# Patient Record
Sex: MALE | Race: WHITE | NOT HISPANIC OR LATINO | Employment: FULL TIME | URBAN - METROPOLITAN AREA
[De-identification: names, ages, dates, MRNs, and addresses within clinical notes are randomized per-mention and may not be internally consistent; named-entity substitution may affect disease eponyms.]

---

## 2017-10-04 ENCOUNTER — HOSPITAL ENCOUNTER (OUTPATIENT)
Dept: LAB | Facility: MEDICAL CENTER | Age: 67
End: 2017-10-04
Attending: PODIATRIST
Payer: MEDICARE

## 2017-10-04 PROCEDURE — 87205 SMEAR GRAM STAIN: CPT

## 2017-10-04 PROCEDURE — 87070 CULTURE OTHR SPECIMN AEROBIC: CPT

## 2017-10-05 LAB
GRAM STN SPEC: NORMAL
SIGNIFICANT IND 70042: NORMAL
SITE SITE: NORMAL
SOURCE SOURCE: NORMAL

## 2017-10-07 LAB
BACTERIA WND AEROBE CULT: ABNORMAL
BACTERIA WND AEROBE CULT: ABNORMAL
GRAM STN SPEC: ABNORMAL
SIGNIFICANT IND 70042: ABNORMAL
SITE SITE: ABNORMAL
SOURCE SOURCE: ABNORMAL

## 2017-10-17 ENCOUNTER — HOSPITAL ENCOUNTER (OUTPATIENT)
Facility: MEDICAL CENTER | Age: 67
End: 2017-10-17
Attending: PODIATRIST
Payer: MEDICARE

## 2017-10-17 LAB
GRAM STN SPEC: NORMAL
SIGNIFICANT IND 70042: NORMAL
SITE SITE: NORMAL
SOURCE SOURCE: NORMAL

## 2017-10-17 PROCEDURE — 87070 CULTURE OTHR SPECIMN AEROBIC: CPT

## 2017-10-17 PROCEDURE — 87205 SMEAR GRAM STAIN: CPT

## 2017-10-19 LAB
BACTERIA WND AEROBE CULT: NORMAL
GRAM STN SPEC: NORMAL
SIGNIFICANT IND 70042: NORMAL
SITE SITE: NORMAL
SOURCE SOURCE: NORMAL

## 2017-12-14 ENCOUNTER — HOSPITAL ENCOUNTER (OUTPATIENT)
Dept: LAB | Facility: MEDICAL CENTER | Age: 67
End: 2017-12-14
Attending: INTERNAL MEDICINE
Payer: MEDICARE

## 2017-12-14 LAB
CREAT UR-MCNC: 118.2 MG/DL
MICROALBUMIN UR-MCNC: 66.6 MG/DL
MICROALBUMIN/CREAT UR: 563 MG/G (ref 0–30)

## 2017-12-14 PROCEDURE — 82570 ASSAY OF URINE CREATININE: CPT

## 2017-12-14 PROCEDURE — 82043 UR ALBUMIN QUANTITATIVE: CPT

## 2019-04-29 ENCOUNTER — APPOINTMENT (OUTPATIENT)
Dept: RADIOLOGY | Facility: MEDICAL CENTER | Age: 69
End: 2019-04-29
Attending: EMERGENCY MEDICINE
Payer: MEDICARE

## 2019-04-29 ENCOUNTER — HOSPITAL ENCOUNTER (EMERGENCY)
Facility: MEDICAL CENTER | Age: 69
End: 2019-04-29
Attending: EMERGENCY MEDICINE
Payer: MEDICARE

## 2019-04-29 VITALS
BODY MASS INDEX: 31.44 KG/M2 | DIASTOLIC BLOOD PRESSURE: 81 MMHG | HEIGHT: 74 IN | HEART RATE: 58 BPM | SYSTOLIC BLOOD PRESSURE: 183 MMHG | TEMPERATURE: 97.8 F | WEIGHT: 245 LBS | RESPIRATION RATE: 18 BRPM | OXYGEN SATURATION: 95 %

## 2019-04-29 DIAGNOSIS — R42 VERTIGO: ICD-10-CM

## 2019-04-29 DIAGNOSIS — R42 LIGHTHEADEDNESS: ICD-10-CM

## 2019-04-29 LAB
ALBUMIN SERPL BCP-MCNC: 4.2 G/DL (ref 3.2–4.9)
ALBUMIN/GLOB SERPL: 1.5 G/DL
ALP SERPL-CCNC: 89 U/L (ref 30–99)
ALT SERPL-CCNC: 23 U/L (ref 2–50)
ANION GAP SERPL CALC-SCNC: 9 MMOL/L (ref 0–11.9)
AST SERPL-CCNC: 19 U/L (ref 12–45)
BASOPHILS # BLD AUTO: 0.8 % (ref 0–1.8)
BASOPHILS # BLD: 0.04 K/UL (ref 0–0.12)
BILIRUB SERPL-MCNC: 1.1 MG/DL (ref 0.1–1.5)
BUN SERPL-MCNC: 17 MG/DL (ref 8–22)
CALCIUM SERPL-MCNC: 8.8 MG/DL (ref 8.4–10.2)
CHLORIDE SERPL-SCNC: 100 MMOL/L (ref 96–112)
CO2 SERPL-SCNC: 27 MMOL/L (ref 20–33)
CREAT SERPL-MCNC: 0.75 MG/DL (ref 0.5–1.4)
EKG IMPRESSION: NORMAL
EOSINOPHIL # BLD AUTO: 0.21 K/UL (ref 0–0.51)
EOSINOPHIL NFR BLD: 4.2 % (ref 0–6.9)
ERYTHROCYTE [DISTWIDTH] IN BLOOD BY AUTOMATED COUNT: 40.5 FL (ref 35.9–50)
GLOBULIN SER CALC-MCNC: 2.8 G/DL (ref 1.9–3.5)
GLUCOSE SERPL-MCNC: 173 MG/DL (ref 65–99)
HCT VFR BLD AUTO: 42.3 % (ref 42–52)
HGB BLD-MCNC: 14.6 G/DL (ref 14–18)
IMM GRANULOCYTES # BLD AUTO: 0.03 K/UL (ref 0–0.11)
IMM GRANULOCYTES NFR BLD AUTO: 0.6 % (ref 0–0.9)
LYMPHOCYTES # BLD AUTO: 1.51 K/UL (ref 1–4.8)
LYMPHOCYTES NFR BLD: 30.3 % (ref 22–41)
MAGNESIUM SERPL-MCNC: 1.6 MG/DL (ref 1.5–2.5)
MCH RBC QN AUTO: 30.4 PG (ref 27–33)
MCHC RBC AUTO-ENTMCNC: 34.5 G/DL (ref 33.7–35.3)
MCV RBC AUTO: 87.9 FL (ref 81.4–97.8)
MONOCYTES # BLD AUTO: 0.52 K/UL (ref 0–0.85)
MONOCYTES NFR BLD AUTO: 10.4 % (ref 0–13.4)
NEUTROPHILS # BLD AUTO: 2.68 K/UL (ref 1.82–7.42)
NEUTROPHILS NFR BLD: 53.7 % (ref 44–72)
NRBC # BLD AUTO: 0 K/UL
NRBC BLD-RTO: 0 /100 WBC
PLATELET # BLD AUTO: 171 K/UL (ref 164–446)
PMV BLD AUTO: 8.9 FL (ref 9–12.9)
POTASSIUM SERPL-SCNC: 3.7 MMOL/L (ref 3.6–5.5)
PROT SERPL-MCNC: 7 G/DL (ref 6–8.2)
RBC # BLD AUTO: 4.81 M/UL (ref 4.7–6.1)
SODIUM SERPL-SCNC: 136 MMOL/L (ref 135–145)
TROPONIN I SERPL-MCNC: <0.02 NG/ML (ref 0–0.04)
WBC # BLD AUTO: 5 K/UL (ref 4.8–10.8)

## 2019-04-29 PROCEDURE — 700101 HCHG RX REV CODE 250: Performed by: EMERGENCY MEDICINE

## 2019-04-29 PROCEDURE — 93005 ELECTROCARDIOGRAM TRACING: CPT

## 2019-04-29 PROCEDURE — 70551 MRI BRAIN STEM W/O DYE: CPT

## 2019-04-29 PROCEDURE — 700102 HCHG RX REV CODE 250 W/ 637 OVERRIDE(OP): Performed by: EMERGENCY MEDICINE

## 2019-04-29 PROCEDURE — 36415 COLL VENOUS BLD VENIPUNCTURE: CPT

## 2019-04-29 PROCEDURE — 85025 COMPLETE CBC W/AUTO DIFF WBC: CPT

## 2019-04-29 PROCEDURE — 99285 EMERGENCY DEPT VISIT HI MDM: CPT

## 2019-04-29 PROCEDURE — 84484 ASSAY OF TROPONIN QUANT: CPT

## 2019-04-29 PROCEDURE — 93005 ELECTROCARDIOGRAM TRACING: CPT | Performed by: EMERGENCY MEDICINE

## 2019-04-29 PROCEDURE — 96374 THER/PROPH/DIAG INJ IV PUSH: CPT

## 2019-04-29 PROCEDURE — 71045 X-RAY EXAM CHEST 1 VIEW: CPT

## 2019-04-29 PROCEDURE — 80053 COMPREHEN METABOLIC PANEL: CPT

## 2019-04-29 PROCEDURE — A9270 NON-COVERED ITEM OR SERVICE: HCPCS | Performed by: EMERGENCY MEDICINE

## 2019-04-29 PROCEDURE — 83735 ASSAY OF MAGNESIUM: CPT

## 2019-04-29 PROCEDURE — 700105 HCHG RX REV CODE 258: Performed by: EMERGENCY MEDICINE

## 2019-04-29 RX ORDER — SODIUM CHLORIDE 9 MG/ML
INJECTION, SOLUTION INTRAVENOUS CONTINUOUS
Status: DISCONTINUED | OUTPATIENT
Start: 2019-04-29 | End: 2019-04-29 | Stop reason: HOSPADM

## 2019-04-29 RX ORDER — MECLIZINE HYDROCHLORIDE 25 MG/1
25 TABLET ORAL 3 TIMES DAILY PRN
Qty: 30 TAB | Refills: 0 | Status: SHIPPED | OUTPATIENT
Start: 2019-04-29 | End: 2022-02-07

## 2019-04-29 RX ORDER — PROMETHAZINE HYDROCHLORIDE 25 MG/1
25 TABLET ORAL EVERY 6 HOURS PRN
Qty: 30 TAB | Refills: 0 | Status: SHIPPED | OUTPATIENT
Start: 2019-04-29 | End: 2023-07-06

## 2019-04-29 RX ORDER — METOPROLOL TARTRATE 1 MG/ML
5 INJECTION, SOLUTION INTRAVENOUS ONCE
Status: COMPLETED | OUTPATIENT
Start: 2019-04-29 | End: 2019-04-29

## 2019-04-29 RX ORDER — PROMETHAZINE HYDROCHLORIDE 25 MG/1
25 TABLET ORAL ONCE
Status: COMPLETED | OUTPATIENT
Start: 2019-04-29 | End: 2019-04-29

## 2019-04-29 RX ADMIN — METOPROLOL TARTRATE 5 MG: 1 INJECTION, SOLUTION INTRAVENOUS at 14:40

## 2019-04-29 RX ADMIN — SODIUM CHLORIDE 1000 ML: 9 INJECTION, SOLUTION INTRAVENOUS at 14:39

## 2019-04-29 RX ADMIN — PROMETHAZINE HYDROCHLORIDE 25 MG: 25 TABLET ORAL at 14:39

## 2019-04-29 ASSESSMENT — LIFESTYLE VARIABLES: DO YOU DRINK ALCOHOL: NO

## 2019-04-29 NOTE — ED TRIAGE NOTES
Chief Complaint   Patient presents with   • Dizziness     Pt states that he has had dizziness x couple of days, worse x 12 hours

## 2019-04-29 NOTE — ED PROVIDER NOTES
ED Provider Note    CHIEF COMPLAINT  Chief Complaint   Patient presents with   • Dizziness     Pt states that he has had dizziness x couple of days, worse x 12 hours       HPI  Dejan Mooney is a 68 y.o. diabetic male who presents with 12 hours of increasing dizziness that he describes as lightheadedness and feeling off balance.  He has a history of intermittent vertigo, and he does not feel like this is the same.  He did see the audiologist at his ENT office who did some maneuvers and stated they felt like this was classic right-sided labyrinthitis, but the patient feels like this is very different.  His symptoms are exacerbated with his head being elevated but not with turning.  He has had a little bit more shortness of breath than usual.  He denies chest pain.  He has had intermittent headaches over the last month, worsening over the last 3 days.  He has been nauseated without vomiting.  No diarrhea or dysuria.  He has not taken his antihypertensives for the last 3 days and no gabapentin today for diabetic neuropathy.    REVIEW OF SYSTEMS  See HPI for further details. All other systems are negative.    PAST MEDICAL HISTORY  Past Medical History:   Diagnosis Date   • Renal calculus 7/2013    kidney stones   • Pneumonia 1974   • BBB (bundle branch block)    • Breath shortness    • Congestive heart failure (HCC)     mild, cardiologist, Dr. Monster Klein   • Diabetes     insulin and oral medication   • Heart burn    • Hypertension    • Indigestion    • Pain     knee, shoulder   • Psychiatric problem     depression   • Snoring     sleep apnea questionairre completed       FAMILY HISTORY  History reviewed. No pertinent family history.    SOCIAL HISTORY  Social History     Social History   • Marital status:      Spouse name: N/A   • Number of children: N/A   • Years of education: N/A     Social History Main Topics   • Smoking status: Never Smoker   • Smokeless tobacco: Never Used   • Alcohol use No   •  "Drug use: No   • Sexual activity: Not on file     Other Topics Concern   • Not on file     Social History Narrative   • No narrative on file       SURGICAL HISTORY  Past Surgical History:   Procedure Laterality Date   • RECOVERY  5/13/2014    Performed by [facility]Rec[facility] Surgery at SURGERY SAME DAY [facility]   • OTHER  2/2014    right knee   • KNEE ARTHROSCOPY  8/22/2013    Performed by Maurisio Alfaro M.D. at SURGERY [facility]   • MEDIAL MENISCECTOMY  8/22/2013    Performed by Maurisio Alfaro M.D. at SURGERY [facility]   • SHOULDER ARTHROTOMY  2003    right   • KNEE ARTHROSCOPY  1990    left   • KNEE ARTHROSCOPY  1985    right   • ANKLE ORIF  1984    left   • KNEE ORIF  1970    left   • TONSILLECTOMY  as child       CURRENT MEDICATIONS  Home Medications    **Home medications have not yet been reviewed for this encounter**         ALLERGIES  Allergies   Allergen Reactions   • Pcn [Penicillins] Hives and Swelling       PHYSICAL EXAM  VITAL SIGNS: BP (!) 183/81   Pulse 65   Temp 36.5 °C (97.7 °F) (Temporal)   Resp 18   Ht 1.88 m (6' 2\")   Wt 111.1 kg (245 lb)   SpO2 96%   BMI 31.46 kg/m²  @JENNIFER[[id number]::@   Constitutional: Well developed, obese, No acute respiratory distress, Non-toxic appearance.   HENT: Normocephalic, Atraumatic, Bilateral external ears normal, Oropharynx clear, mucous membranes are dry.  Eyes: PERRLA at 4 mm, EOMI without nystagmus, Conjunctiva normal, No discharge. No icterus.  No significant visual field cuts  Neck: Normal range of motion. Supple, No stridor.   Lymphatic: No cervical lymphadenopathy noted.   Cardiovascular: Normal heart rate, Normal rhythm, No murmurs, No rubs, No gallops.   Thorax & Lungs: Clear to auscultation bilaterally, No respiratory distress, No wheezing.  Abdomen: Bowel sounds normal, Soft, No tenderness, No masses, no rebound or guarding   Skin: Warm, Dry, No erythema, No rash.   Extremities: Intact distal pulses, trace shin edema, No " tenderness.  5 out of 5 strength bilateral upper and lower extremities against my resistance.  Musculoskeletal: Good range of motion in all major joints. No tenderness to palpation or major deformities noted.   Neurologic: Alert & oriented x 3, cranial nerve and cerebellar exams normal.  Sensation intact to the hands, feet, face.  Finger-to-nose, hand roll, heel-to-shin all intact symmetrically  Psychiatric: Affect normal, Judgment normal, mildly anxious    EKG  Twelve-lead EKG by my interpretation is as below.  No ST or T wave changes to indicate ischemia or infarct    RADIOLOGY/PROCEDURES  MR-BRAIN-W/O   Final Result      1.  Mild cerebral atrophy.   2.  Mild chronic microvascular ischemic disease.   3.  No acute infarct.      DX-CHEST-PORTABLE (1 VIEW)   Final Result      No acute cardiopulmonary abnormality identified.      CT-HEAD W/O    (Results Pending)         COURSE & MEDICAL DECISION MAKING  Pertinent Labs & Imaging studies reviewed. (See chart for details)  Differential diagnosis may include labyrinthitis, neoplasm, tumor, stroke.  IV fluids given for poor p.o. intake, mucous membranes being dry.  Results for orders placed or performed during the hospital encounter of 04/29/19   CBC with Differential   Result Value Ref Range    WBC 5.0 4.8 - 10.8 K/uL    RBC 4.81 4.70 - 6.10 M/uL    Hemoglobin 14.6 14.0 - 18.0 g/dL    Hematocrit 42.3 42.0 - 52.0 %    MCV 87.9 81.4 - 97.8 fL    MCH 30.4 27.0 - 33.0 pg    MCHC 34.5 33.7 - 35.3 g/dL    RDW 40.5 35.9 - 50.0 fL    Platelet Count 171 164 - 446 K/uL    MPV 8.9 (L) 9.0 - 12.9 fL    Neutrophils-Polys 53.70 44.00 - 72.00 %    Lymphocytes 30.30 22.00 - 41.00 %    Monocytes 10.40 0.00 - 13.40 %    Eosinophils 4.20 0.00 - 6.90 %    Basophils 0.80 0.00 - 1.80 %    Immature Granulocytes 0.60 0.00 - 0.90 %    Nucleated RBC 0.00 /100 WBC    Neutrophils (Absolute) 2.68 1.82 - 7.42 K/uL    Lymphs (Absolute) 1.51 1.00 - 4.80 K/uL    Monos (Absolute) 0.52 0.00 - 0.85 K/uL     Eos (Absolute) 0.21 0.00 - 0.51 K/uL    Baso (Absolute) 0.04 0.00 - 0.12 K/uL    Immature Granulocytes (abs) 0.03 0.00 - 0.11 K/uL    NRBC (Absolute) 0.00 K/uL   Complete Metabolic Panel (CMP)   Result Value Ref Range    Sodium 136 135 - 145 mmol/L    Potassium 3.7 3.6 - 5.5 mmol/L    Chloride 100 96 - 112 mmol/L    Co2 27 20 - 33 mmol/L    Anion Gap 9.0 0.0 - 11.9    Glucose 173 (H) 65 - 99 mg/dL    Bun 17 8 - 22 mg/dL    Creatinine 0.75 0.50 - 1.40 mg/dL    Calcium 8.8 8.4 - 10.2 mg/dL    AST(SGOT) 19 12 - 45 U/L    ALT(SGPT) 23 2 - 50 U/L    Alkaline Phosphatase 89 30 - 99 U/L    Total Bilirubin 1.1 0.1 - 1.5 mg/dL    Albumin 4.2 3.2 - 4.9 g/dL    Total Protein 7.0 6.0 - 8.2 g/dL    Globulin 2.8 1.9 - 3.5 g/dL    A-G Ratio 1.5 g/dL   Troponin   Result Value Ref Range    Troponin I <0.02 0.00 - 0.04 ng/mL   MAGNESIUM   Result Value Ref Range    Magnesium 1.6 1.5 - 2.5 mg/dL   ESTIMATED GFR   Result Value Ref Range    GFR If African American >60 >60 mL/min/1.73 m 2    GFR If Non African American >60 >60 mL/min/1.73 m 2   EKG   Result Value Ref Range    Report       [facility] Emergency Dept.    Test Date:  2019  Pt Name:    TRAVON THURSTON         Department: [facility]  MRN:        [id number]                      Room:       -ROOM 10  Gender:     Male                         Technician: CLARENCE  :        1950                   Requested By:ER TRIAGE PROTOCOL  Order #:    [id number]                    Reading MD: ARLEEN KIMBALL MD    Measurements  Intervals                                Axis  Rate:       63                           P:          32  DE:         187                          QRS:        -45  QRSD:       133                          T:          3  QT:         429  QTc:        440    Interpretive Statements  Sinus rhythm  Right bundle branch block  Inferior infarct, old  Compared to ECG 2014 09:48:58  No significant changes    Electronically Signed On 2019  16:15:13 PDT by ARLEEN KIMBALL MD      4:16 PM reevaluation of the patient at bedside.  Patient is sitting up, is less nauseated and feels fairly well until he turns his head towards the right.  He feels safe going home and following up with his primary care provider and audiologist later this week.  He thinks that the IV fluids and Phenergan worked well to decrease his severity of symptoms.  Repeat systolic blood pressure is 161, improved with metoprolol.     The patient will return for new or worsening symptoms and is stable at the time of discharge.    The patient is referred to a primary physician for blood pressure management, diabetic screening, and for all other preventative health concerns.    DISPOSITION:  Patient will be discharged home in stable condition.    FOLLOW UP:  Dot Ray M.D.  [id number] [facility] [place] [address]  [phone]    Schedule an appointment as soon as possible for a visit       your audiologist            OUTPATIENT MEDICATIONS:  New Prescriptions    MECLIZINE (ANTIVERT) 25 MG TAB    Take 1 Tab by mouth 3 times a day as needed for Dizziness or Vertigo.    PROMETHAZINE (PHENERGAN) 25 MG TAB    Take 1 Tab by mouth every 6 hours as needed for Nausea/Vomiting.         FINAL IMPRESSION  1. Vertigo    2. Lightheadedness               Electronically signed by: Arleen Kimball, 4/29/2019 2:30 PM

## 2019-04-29 NOTE — ED NOTES
Pt discharged, reviewed all discharge instructions including follow up, pt verbalizes understanding, and denies questions.   Escorted to lobby. No belongings left in room.

## 2019-04-29 NOTE — ED NOTES
MRI screening completed  Pt updated on plan of care  Pt aware of need for urine sample and provided urinal

## 2019-06-27 ENCOUNTER — APPOINTMENT (RX ONLY)
Dept: URBAN - METROPOLITAN AREA CLINIC 35 | Facility: CLINIC | Age: 69
Setting detail: DERMATOLOGY
End: 2019-06-27

## 2019-06-27 DIAGNOSIS — Z71.89 OTHER SPECIFIED COUNSELING: ICD-10-CM

## 2019-06-27 DIAGNOSIS — L81.4 OTHER MELANIN HYPERPIGMENTATION: ICD-10-CM

## 2019-06-27 DIAGNOSIS — D22 MELANOCYTIC NEVI: ICD-10-CM

## 2019-06-27 DIAGNOSIS — L57.0 ACTINIC KERATOSIS: ICD-10-CM

## 2019-06-27 DIAGNOSIS — I87.2 VENOUS INSUFFICIENCY (CHRONIC) (PERIPHERAL): ICD-10-CM

## 2019-06-27 DIAGNOSIS — L82.1 OTHER SEBORRHEIC KERATOSIS: ICD-10-CM

## 2019-06-27 PROBLEM — D22.61 MELANOCYTIC NEVI OF RIGHT UPPER LIMB, INCLUDING SHOULDER: Status: ACTIVE | Noted: 2019-06-27

## 2019-06-27 PROCEDURE — ? COUNSELING

## 2019-06-27 PROCEDURE — 99213 OFFICE O/P EST LOW 20 MIN: CPT | Mod: 25

## 2019-06-27 PROCEDURE — ? LIQUID NITROGEN

## 2019-06-27 PROCEDURE — 17003 DESTRUCT PREMALG LES 2-14: CPT

## 2019-06-27 PROCEDURE — 17000 DESTRUCT PREMALG LESION: CPT

## 2019-06-27 PROCEDURE — ? PRESCRIPTION

## 2019-06-27 PROCEDURE — ? TREATMENT REGIMEN

## 2019-06-27 RX ORDER — CALCIPOTRIENE 50 UG/G
CREAM TOPICAL BID
Qty: 1 | Refills: 0 | Status: ERX | COMMUNITY
Start: 2019-06-27

## 2019-06-27 RX ORDER — FLUOROURACIL 50 MG/G
CREAM TOPICAL BID
Qty: 1 | Refills: 0 | Status: ERX | COMMUNITY
Start: 2019-06-27

## 2019-06-27 RX ADMIN — CALCIPOTRIENE THIN LAYER: 50 CREAM TOPICAL at 00:00

## 2019-06-27 RX ADMIN — FLUOROURACIL THIN LAYER: 50 CREAM TOPICAL at 00:00

## 2019-06-27 ASSESSMENT — LOCATION DETAILED DESCRIPTION DERM
LOCATION DETAILED: RIGHT LATERAL TRAPEZIAL NECK
LOCATION DETAILED: RIGHT POSTERIOR SHOULDER
LOCATION DETAILED: RIGHT PROXIMAL PRETIBIAL REGION
LOCATION DETAILED: LEFT ANTERIOR PROXIMAL THIGH
LOCATION DETAILED: LEFT DISTAL DORSAL FOREARM
LOCATION DETAILED: RIGHT PROXIMAL DORSAL FOREARM
LOCATION DETAILED: RIGHT MEDIAL TEMPLE

## 2019-06-27 ASSESSMENT — LOCATION SIMPLE DESCRIPTION DERM
LOCATION SIMPLE: POSTERIOR NECK
LOCATION SIMPLE: LEFT THIGH
LOCATION SIMPLE: RIGHT TEMPLE
LOCATION SIMPLE: RIGHT PRETIBIAL REGION
LOCATION SIMPLE: LEFT FOREARM
LOCATION SIMPLE: RIGHT FOREARM
LOCATION SIMPLE: RIGHT SHOULDER

## 2019-06-27 ASSESSMENT — LOCATION ZONE DERM
LOCATION ZONE: ARM
LOCATION ZONE: FACE
LOCATION ZONE: NECK
LOCATION ZONE: LEG

## 2019-06-27 NOTE — PROCEDURE: TREATMENT REGIMEN
Detail Level: Simple
Initiate Treatment: Fluorouracil 5% topical cream apply twice daily for 4-5 days\\nCalcipotriene.0.05% topical cream apply on top of fluorouracil twice daily for 4-5 days\\nTreatment to begin in the fall
Detail Level: Zone
Continue Regimen: Triamcinlone 0.1% cream BID when flared\\ncompression stockings

## 2020-03-31 ENCOUNTER — APPOINTMENT (OUTPATIENT)
Dept: NEPHROLOGY | Facility: MEDICAL CENTER | Age: 70
End: 2020-03-31
Payer: MEDICARE

## 2020-06-03 ENCOUNTER — OFFICE VISIT (OUTPATIENT)
Dept: NEPHROLOGY | Facility: MEDICAL CENTER | Age: 70
End: 2020-06-03
Payer: MEDICARE

## 2020-06-03 VITALS
OXYGEN SATURATION: 96 % | HEIGHT: 74 IN | SYSTOLIC BLOOD PRESSURE: 148 MMHG | RESPIRATION RATE: 16 BRPM | HEART RATE: 64 BPM | WEIGHT: 249 LBS | TEMPERATURE: 97.2 F | BODY MASS INDEX: 31.95 KG/M2 | DIASTOLIC BLOOD PRESSURE: 76 MMHG

## 2020-06-03 DIAGNOSIS — N18.9 CHRONIC KIDNEY DISEASE, UNSPECIFIED CKD STAGE: ICD-10-CM

## 2020-06-03 DIAGNOSIS — E11.29 TYPE 2 DIABETES MELLITUS WITH MICROALBUMINURIA, WITH LONG-TERM CURRENT USE OF INSULIN (HCC): ICD-10-CM

## 2020-06-03 DIAGNOSIS — I10 ESSENTIAL HYPERTENSION: ICD-10-CM

## 2020-06-03 DIAGNOSIS — Z79.4 TYPE 2 DIABETES MELLITUS WITH MICROALBUMINURIA, WITH LONG-TERM CURRENT USE OF INSULIN (HCC): ICD-10-CM

## 2020-06-03 DIAGNOSIS — R80.9 PROTEINURIA, UNSPECIFIED TYPE: ICD-10-CM

## 2020-06-03 DIAGNOSIS — R80.9 TYPE 2 DIABETES MELLITUS WITH MICROALBUMINURIA, WITH LONG-TERM CURRENT USE OF INSULIN (HCC): ICD-10-CM

## 2020-06-03 PROCEDURE — 99204 OFFICE O/P NEW MOD 45 MIN: CPT | Performed by: INTERNAL MEDICINE

## 2020-06-03 RX ORDER — IRBESARTAN 150 MG/1
300 TABLET ORAL DAILY
COMMUNITY
Start: 2020-03-31 | End: 2022-02-07 | Stop reason: SDUPTHER

## 2020-06-03 ASSESSMENT — ENCOUNTER SYMPTOMS
HYPERTENSION: 1
FEVER: 0
COUGH: 0
SHORTNESS OF BREATH: 0
NAUSEA: 0
CHILLS: 0
VOMITING: 0

## 2020-06-03 ASSESSMENT — FIBROSIS 4 INDEX: FIB4 SCORE: 1.55

## 2020-06-03 NOTE — PROGRESS NOTES
"Subjective:      Dejan Mooney is a 69 y.o. male who presents with Hypertension and Chronic Kidney Disease            Patient is a pleasant 69-year-old gentleman with past medical history significant for longstanding diabetes, diabetic retinopathy, recently found to have albuminuria.  Patient has no recent use of NSAIDs or IV contrast exposure    Hypertension   This is a chronic problem. The current episode started more than 1 year ago. The problem is unchanged. The problem is uncontrolled. Associated symptoms include peripheral edema. Pertinent negatives include no chest pain, malaise/fatigue or shortness of breath. Risk factors for coronary artery disease include diabetes mellitus, male gender and obesity. Past treatments include angiotensin blockers. The current treatment provides moderate improvement. Compliance problems include diet.  Hypertensive end-organ damage includes kidney disease. Identifiable causes of hypertension include chronic renal disease.   Chronic Kidney Disease   This is a chronic problem. The current episode started more than 1 year ago. The problem occurs constantly. The problem has been unchanged. Pertinent negatives include no chest pain, chills, coughing, fever, nausea, urinary symptoms or vomiting.       Review of Systems   Constitutional: Negative for chills, fever and malaise/fatigue.   Respiratory: Negative for cough and shortness of breath.    Cardiovascular: Negative for chest pain and leg swelling.   Gastrointestinal: Negative for nausea and vomiting.   Genitourinary: Negative for dysuria, frequency and urgency.   All other systems reviewed and are negative.         Objective:     /76 (BP Location: Right arm, Patient Position: Sitting)   Pulse 64   Temp 36.2 °C (97.2 °F)   Resp 16   Ht 1.88 m (6' 2\")   Wt 112.9 kg (249 lb)   SpO2 96%   BMI 31.97 kg/m²      Physical Exam  Vitals signs and nursing note reviewed.   Constitutional:       General: He is awake. " He is not in acute distress.     Appearance: He is not ill-appearing.   HENT:      Head: Normocephalic and atraumatic.      Right Ear: External ear normal.      Left Ear: External ear normal.      Nose: Nose normal.      Mouth/Throat:      Pharynx: No oropharyngeal exudate or posterior oropharyngeal erythema.   Eyes:      General: No scleral icterus.        Right eye: No discharge.         Left eye: No discharge.      Conjunctiva/sclera: Conjunctivae normal.   Neck:      Musculoskeletal: No neck rigidity or muscular tenderness.   Cardiovascular:      Rate and Rhythm: Normal rate and regular rhythm.      Heart sounds: No murmur. No gallop.    Pulmonary:      Effort: Pulmonary effort is normal. No respiratory distress.      Breath sounds: Normal breath sounds. No wheezing.      Comments: Coarse BS  Abdominal:      General: Abdomen is flat. Bowel sounds are normal.   Musculoskeletal:         General: No tenderness.      Right lower leg: Edema present.      Left lower leg: Edema present.   Skin:     General: Skin is warm and dry.      Coloration: Skin is not jaundiced.   Neurological:      General: No focal deficit present.      Mental Status: He is alert and oriented to person, place, and time. Mental status is at baseline.   Psychiatric:         Mood and Affect: Mood normal.         Behavior: Behavior normal.         Thought Content: Thought content normal.       Past Medical History:   Diagnosis Date   • BBB (bundle branch block)    • Breath shortness    • Congestive heart failure (HCC)     mild, cardiologist, Dr. Monster Klein   • Diabetes     insulin and oral medication   • Heart burn    • Hypertension    • Indigestion    • Pain     knee, shoulder   • Pneumonia 1974   • Psychiatric problem     depression   • Renal calculus 7/2013    kidney stones   • Snoring     sleep apnea questionairre completed     Social History     Socioeconomic History   • Marital status:      Spouse name: Not on file   • Number of  children: Not on file   • Years of education: Not on file   • Highest education level: Not on file   Occupational History   • Not on file   Social Needs   • Financial resource strain: Not on file   • Food insecurity     Worry: Not on file     Inability: Not on file   • Transportation needs     Medical: Not on file     Non-medical: Not on file   Tobacco Use   • Smoking status: Never Smoker   • Smokeless tobacco: Never Used   Substance and Sexual Activity   • Alcohol use: No   • Drug use: No   • Sexual activity: Not on file   Lifestyle   • Physical activity     Days per week: Not on file     Minutes per session: Not on file   • Stress: Not on file   Relationships   • Social connections     Talks on phone: Not on file     Gets together: Not on file     Attends Jewish service: Not on file     Active member of club or organization: Not on file     Attends meetings of clubs or organizations: Not on file     Relationship status: Not on file   • Intimate partner violence     Fear of current or ex partner: Not on file     Emotionally abused: Not on file     Physically abused: Not on file     Forced sexual activity: Not on file   Other Topics Concern   • Not on file   Social History Narrative   • Not on file     History reviewed. No pertinent family history.  No results for input(s): HGB, ALBUMIN, HDL, TRIGLYCERIDE, SODIUM, POTASSIUM, CHLORIDE, CO2, BUN, CREATININE, PHOSPHORUS in the last 8544 hours.    Invalid input(s): CHOLESTEROL, LDL CLACULATED, URIC ACID, INTACT PTH, PRO CREAT RATIO  Labs from February 25, 2020 showed creatinine 0.88 mg/dL  Urine albumin to creatinine ratio was 790            Assessment/Plan:       1. Essential hypertension  Uncontrolled  Patient was advised to be on low-sodium diet  Take his medication regularly  Check blood pressure at home frequently and call us with the results to see if we need to adjust his medication    2. Chronic kidney disease, unspecified CKD stage  Most likely early stage  of diabetic nephropathy  Stable  No uremic symptoms  Renal dose of medication  Avoid nephrotoxins  Continue same medication regimen  Check labs in 6-month    3. Proteinuria, unspecified type  Continue ARB  Low protein diet, I advised the patient to be on 0.8 mg/kg per 24-hour of protein  4. Type 2 diabetes mellitus with microalbuminuria, with long-term current use of insulin (HCC)  Recommend to start SGLT2 inhibitor

## 2020-07-07 ENCOUNTER — APPOINTMENT (OUTPATIENT)
Dept: RADIOLOGY | Facility: MEDICAL CENTER | Age: 70
End: 2020-07-07
Attending: EMERGENCY MEDICINE
Payer: MEDICARE

## 2020-07-07 ENCOUNTER — HOSPITAL ENCOUNTER (EMERGENCY)
Facility: MEDICAL CENTER | Age: 70
End: 2020-07-07
Attending: EMERGENCY MEDICINE
Payer: MEDICARE

## 2020-07-07 VITALS
HEIGHT: 74 IN | OXYGEN SATURATION: 97 % | DIASTOLIC BLOOD PRESSURE: 63 MMHG | BODY MASS INDEX: 30.8 KG/M2 | SYSTOLIC BLOOD PRESSURE: 128 MMHG | HEART RATE: 72 BPM | WEIGHT: 240 LBS | RESPIRATION RATE: 18 BRPM | TEMPERATURE: 98.9 F

## 2020-07-07 DIAGNOSIS — R50.9 FEVER, UNSPECIFIED FEVER CAUSE: ICD-10-CM

## 2020-07-07 DIAGNOSIS — Z20.822 SUSPECTED COVID-19 VIRUS INFECTION: ICD-10-CM

## 2020-07-07 DIAGNOSIS — R00.0 TACHYCARDIA: ICD-10-CM

## 2020-07-07 DIAGNOSIS — B34.9 VIRAL ILLNESS: ICD-10-CM

## 2020-07-07 LAB
ALBUMIN SERPL BCP-MCNC: 4 G/DL (ref 3.2–4.9)
ALBUMIN/GLOB SERPL: 1.5 G/DL
ALP SERPL-CCNC: 76 U/L (ref 30–99)
ALT SERPL-CCNC: 17 U/L (ref 2–50)
ANION GAP SERPL CALC-SCNC: 17 MMOL/L (ref 7–16)
APPEARANCE UR: CLEAR
AST SERPL-CCNC: 15 U/L (ref 12–45)
BACTERIA #/AREA URNS HPF: NEGATIVE /HPF
BASOPHILS # BLD AUTO: 0.3 % (ref 0–1.8)
BASOPHILS # BLD: 0.04 K/UL (ref 0–0.12)
BILIRUB SERPL-MCNC: 1 MG/DL (ref 0.1–1.5)
BILIRUB UR QL STRIP.AUTO: ABNORMAL
BUN SERPL-MCNC: 29 MG/DL (ref 8–22)
CALCIUM SERPL-MCNC: 9 MG/DL (ref 8.5–10.5)
CHLORIDE SERPL-SCNC: 97 MMOL/L (ref 96–112)
CO2 SERPL-SCNC: 21 MMOL/L (ref 20–33)
COLOR UR: ABNORMAL
COVID ORDER STATUS COVID19: NORMAL
CREAT SERPL-MCNC: 1.21 MG/DL (ref 0.5–1.4)
EOSINOPHIL # BLD AUTO: 0 K/UL (ref 0–0.51)
EOSINOPHIL NFR BLD: 0 % (ref 0–6.9)
EPI CELLS #/AREA URNS HPF: ABNORMAL /HPF
ERYTHROCYTE [DISTWIDTH] IN BLOOD BY AUTOMATED COUNT: 40.5 FL (ref 35.9–50)
GLOBULIN SER CALC-MCNC: 2.6 G/DL (ref 1.9–3.5)
GLUCOSE SERPL-MCNC: 181 MG/DL (ref 65–99)
GLUCOSE UR STRIP.AUTO-MCNC: NEGATIVE MG/DL
HCT VFR BLD AUTO: 43.8 % (ref 42–52)
HGB BLD-MCNC: 14.6 G/DL (ref 14–18)
HYALINE CASTS #/AREA URNS LPF: ABNORMAL /LPF
IMM GRANULOCYTES # BLD AUTO: 0.1 K/UL (ref 0–0.11)
IMM GRANULOCYTES NFR BLD AUTO: 0.8 % (ref 0–0.9)
KETONES UR STRIP.AUTO-MCNC: ABNORMAL MG/DL
LACTATE BLD-SCNC: 1 MMOL/L (ref 0.5–2)
LACTATE BLD-SCNC: 1.9 MMOL/L (ref 0.5–2)
LEUKOCYTE ESTERASE UR QL STRIP.AUTO: ABNORMAL
LYMPHOCYTES # BLD AUTO: 0.54 K/UL (ref 1–4.8)
LYMPHOCYTES NFR BLD: 4.1 % (ref 22–41)
MCH RBC QN AUTO: 29.9 PG (ref 27–33)
MCHC RBC AUTO-ENTMCNC: 33.3 G/DL (ref 33.7–35.3)
MCV RBC AUTO: 89.6 FL (ref 81.4–97.8)
MICRO URNS: ABNORMAL
MONOCYTES # BLD AUTO: 0.84 K/UL (ref 0–0.85)
MONOCYTES NFR BLD AUTO: 6.4 % (ref 0–13.4)
MUCOUS THREADS #/AREA URNS HPF: ABNORMAL /HPF
NEUTROPHILS # BLD AUTO: 11.6 K/UL (ref 1.82–7.42)
NEUTROPHILS NFR BLD: 88.4 % (ref 44–72)
NITRITE UR QL STRIP.AUTO: NEGATIVE
NRBC # BLD AUTO: 0 K/UL
NRBC BLD-RTO: 0 /100 WBC
PH UR STRIP.AUTO: 5 [PH] (ref 5–8)
PLATELET # BLD AUTO: 156 K/UL (ref 164–446)
PMV BLD AUTO: 9.2 FL (ref 9–12.9)
POTASSIUM SERPL-SCNC: 4 MMOL/L (ref 3.6–5.5)
PROT SERPL-MCNC: 6.6 G/DL (ref 6–8.2)
PROT UR QL STRIP: 100 MG/DL
RBC # BLD AUTO: 4.89 M/UL (ref 4.7–6.1)
RBC # URNS HPF: ABNORMAL /HPF
RBC UR QL AUTO: NEGATIVE
SARS-COV-2 RNA RESP QL NAA+PROBE: NOTDETECTED
SODIUM SERPL-SCNC: 135 MMOL/L (ref 135–145)
SP GR UR STRIP.AUTO: 1.03
SPECIMEN SOURCE: NORMAL
UROBILINOGEN UR STRIP.AUTO-MCNC: 1 MG/DL
WBC # BLD AUTO: 13.1 K/UL (ref 4.8–10.8)
WBC #/AREA URNS HPF: ABNORMAL /HPF

## 2020-07-07 PROCEDURE — 700102 HCHG RX REV CODE 250 W/ 637 OVERRIDE(OP): Performed by: EMERGENCY MEDICINE

## 2020-07-07 PROCEDURE — 700105 HCHG RX REV CODE 258: Performed by: EMERGENCY MEDICINE

## 2020-07-07 PROCEDURE — 36415 COLL VENOUS BLD VENIPUNCTURE: CPT

## 2020-07-07 PROCEDURE — 51798 US URINE CAPACITY MEASURE: CPT

## 2020-07-07 PROCEDURE — 83605 ASSAY OF LACTIC ACID: CPT

## 2020-07-07 PROCEDURE — 80053 COMPREHEN METABOLIC PANEL: CPT

## 2020-07-07 PROCEDURE — 81001 URINALYSIS AUTO W/SCOPE: CPT

## 2020-07-07 PROCEDURE — 85025 COMPLETE CBC W/AUTO DIFF WBC: CPT

## 2020-07-07 PROCEDURE — A9270 NON-COVERED ITEM OR SERVICE: HCPCS | Performed by: EMERGENCY MEDICINE

## 2020-07-07 PROCEDURE — 71045 X-RAY EXAM CHEST 1 VIEW: CPT

## 2020-07-07 PROCEDURE — C9803 HOPD COVID-19 SPEC COLLECT: HCPCS

## 2020-07-07 PROCEDURE — 87040 BLOOD CULTURE FOR BACTERIA: CPT

## 2020-07-07 PROCEDURE — 99284 EMERGENCY DEPT VISIT MOD MDM: CPT

## 2020-07-07 PROCEDURE — U0004 COV-19 TEST NON-CDC HGH THRU: HCPCS

## 2020-07-07 PROCEDURE — C9803 HOPD COVID-19 SPEC COLLECT: HCPCS | Performed by: EMERGENCY MEDICINE

## 2020-07-07 RX ORDER — SODIUM CHLORIDE 9 MG/ML
1000 INJECTION, SOLUTION INTRAVENOUS ONCE
Status: COMPLETED | OUTPATIENT
Start: 2020-07-07 | End: 2020-07-07

## 2020-07-07 RX ORDER — ACETAMINOPHEN 500 MG
1000 TABLET ORAL ONCE
Status: COMPLETED | OUTPATIENT
Start: 2020-07-07 | End: 2020-07-07

## 2020-07-07 RX ADMIN — ACETAMINOPHEN 1000 MG: 500 TABLET ORAL at 18:28

## 2020-07-07 RX ADMIN — SODIUM CHLORIDE 1000 ML: 9 INJECTION, SOLUTION INTRAVENOUS at 18:28

## 2020-07-07 RX ADMIN — SODIUM CHLORIDE 1000 ML: 9 INJECTION, SOLUTION INTRAVENOUS at 19:47

## 2020-07-07 ASSESSMENT — FIBROSIS 4 INDEX: FIB4 SCORE: 1.55

## 2020-07-08 NOTE — ED PROVIDER NOTES
ED Provider Note    Scribed for Charles Thornton M.D. by Surya Nye. 7/7/2020  6:11 PM    CHIEF COMPLAINT  Chief Complaint   Patient presents with   • Fever     Ambulatory to ED after he was at a meeting today and felt very fatigued. He checked his temperature and it was 104 about 2 hours ago. He did not take meds for it. Endorses nasea. Room air 02 sat 88%. Roomed to 23 immediately     HPI  Dejan Mooney is a 69 y.o. male who presents with complaints of a fever acute onset earlier today while he was sitting in a meeting approximately 6 hours ago. No alleviating factors attempted.  He reports associated fatigue, chills, and diffuse body aches. He was feeling nauseous earlier, but he took a Zofran which resolved this issue. He has not had any back pain, cough, wheezes, chills, back pain or any other symptoms over the past week. He further denies any diarrhea, dysuria, or abdominal pain. Patient's wife recently tested positive for COVID-19, and he states she has been gradually improving. He also notes that they have been distancing from each other within their house.    PPE Note: I personally donned full PPE for all patient encounters during this visit, including being clean-shaven with an N95 respirator mask, gloves, and eye protection. Scribe remained outside the patient's room and did not have any contact with the patient for the duration of patient encounter.       REVIEW OF SYSTEMS  Constitutional: Fevers, chills, fatigue, and generalized body aches.   Skin: No rashes or diaphoresis.  Eyes: No change in vision, no discharge.  ENT: No hearing change. No rhinorrhea or nasal congestion, no ST or difficulty swallowing.  Respiratory: No SOB. No coughing or hemoptysis. No Wheezing.  Cardiac: No CP, palpitations, edema. No PND or orthopnea.  GI: No Abdominal Pain; N/V; diarrhea, constipation. No blood in stool.  : No dysuria. No  D/C. No frequency or urgency. No hesitancy.   MSK: No pain in joints or muscles. No calf pain or swelling.  Neuro: No HA or paresthesias. No focal weakness.  Endocrine: No polyuria or polydipsia. No heat or cold intolerance.  Heme: No easy bruising. No history of bleeding disorders or anemia.    PAST MEDICAL HISTORY   has a past medical history of BBB (bundle branch block), Breath shortness, Congestive heart failure (HCC), Diabetes, Heart burn, Hypertension, Indigestion, Pain, Pneumonia (1974), Psychiatric problem, Renal calculus (7/2013), and Snoring.    SOCIAL HISTORY  Social History     Tobacco Use   • Smoking status: Never Smoker   • Smokeless tobacco: Never Used   Substance and Sexual Activity   • Alcohol use: No   • Drug use: No       SURGICAL HISTORY   has a past surgical history that includes tonsillectomy (as child); ankle orif (1984); shoulder arthrotomy (2003); knee orif (1970); knee arthroscopy (1985); knee arthroscopy (1990); knee arthroscopy (8/22/2013); medial meniscectomy (8/22/2013); other (2/2014); and recovery (5/13/2014).    CURRENT MEDICATIONS  Current Outpatient Medications   Medication Instructions   • amitriptyline (ELAVIL) 10 mg, NIGHTLY   • aspirin 81 mg, DAILY   • atorvastatin (LIPITOR) 20 mg, NIGHTLY   • buPROPion SR (WELLBUTRIN-SR) 150 mg, 2 TIMES DAILY   • celecoxib (CELEBREX) 200 mg, DAILY   • gabapentin (NEURONTIN) 800 mg, 4 TIMES DAILY   • hydrochlorothiazide (MICROZIDE) 12.5 mg, DAILY   • Insulin Detemir (LEVEMIR SC) 40 Units, 2 TIMES DAILY   • irbesartan (AVAPRO) 300 mg, Oral, DAILY, Take 1 tablet by mouth every day   • meclizine (ANTIVERT) 25 mg, Oral, 3 TIMES DAILY PRN   • metFORMIN (GLUCOPHAGE) 500 mg, 2 TIMES DAILY WITH MEALS   • methylPREDNISolone (MEDROL, JEERL,) 4 MG tablet Per label   • metoprolol SR (TOPROL XL) 25 mg, DAILY   • promethazine (PHENERGAN) 25 mg, Oral, EVERY 6 HOURS PRN     ALLERGIES  Allergies   Allergen Reactions   • Pcn [Penicillins] Hives and Swelling  "    PHYSICAL EXAM  VITAL SIGNS: /57   Pulse (!) 123   Temp (!) 38.7 °C (101.6 °F) (Oral)   Resp (!) 26   Ht 1.88 m (6' 2\")   Wt 108.9 kg (240 lb)   SpO2 89%   BMI 30.81 kg/m²    Pulse ox interpretation: I interpret this pulse ox as normal.  Genl: Male, ill but nontoxic in appearance  Head: NC/AT   ENT: Dry mucous membranes, posterior pharynx clear, uvula midline, nares patent bilaterally   Eyes: Normal sclera, pupils equal round reactive to light  Neck: Supple, FROM, no LAD appreciated   Pulmonary: Lungs are clear to auscultation bilaterally  Chest: No TTP  CV:  Tachycardic rate, regular rhythm, no murmur appreciated, pulses 2+ in both upper and lower extremities,  Abdomen: Obese, soft, NT/ND; no rebound/guarding  : no CVA or suprapubic tenderness   Musculoskeletal: No edema. Pain free ROM of the neck. Moving upper and lower extremities and spontaneous in coordinated fashion  Neuro: A&Ox4 (person, place, time, situation), speech fluent, gait steady, no focal deficits appreciated, No cerebellar signs.  Psych: Patient has an appropriate affect and behavior  Skin: No skin breakdown, skin is hot and warm.    DIAGNOSTIC STUDIES / PROCEDURES    LABS  Labs Reviewed   CBC WITH DIFFERENTIAL - Abnormal; Notable for the following components:       Result Value    WBC 13.1 (*)     MCHC 33.3 (*)     Platelet Count 156 (*)     Neutrophils-Polys 88.40 (*)     Lymphocytes 4.10 (*)     Neutrophils (Absolute) 11.60 (*)     Lymphs (Absolute) 0.54 (*)     All other components within normal limits    Narrative:     Droplet, Contact, and Eye Protection   COMP METABOLIC PANEL - Abnormal; Notable for the following components:    Anion Gap 17.0 (*)     Glucose 181 (*)     Bun 29 (*)     All other components within normal limits    Narrative:     Droplet, Contact, and Eye Protection   URINALYSIS,CULTURE IF INDICATED - Abnormal; Notable for the following components:    Ketones Trace (*)     Protein 100 (*)     Bilirubin Small " "(*)     Leukocyte Esterase Trace (*)     All other components within normal limits    Narrative:     Droplet, Contact, and Eye Protection   URINE MICROSCOPIC (W/UA) - Abnormal; Notable for the following components:    WBC 0-2 (*)     RBC 5-10 (*)     Hyaline Cast 6-10 (*)     All other components within normal limits    Narrative:     Droplet, Contact, and Eye Protection   LACTIC ACID    Narrative:     Droplet, Contact, and Eye Protection   BLOOD CULTURE    Narrative:     Droplet, Contact, and Eye Protection  Per Hospital Policy: Only change Specimen Src: to \"Line\" if  specified by physician order.   BLOOD CULTURE    Narrative:     Droplet, Contact, and Eye Protection  Per Hospital Policy: Only change Specimen Src: to \"Line\" if  specified by physician order.   COVID/SARS COV-2    Narrative:     Droplet, Contact, and Eye Protection  Expected Turn around time?->Rush (Cepheid 2-4 hours)   SARS-COV-2, PCR (IN-HOUSE)    Narrative:     Droplet, Contact, and Eye Protection  Expected Turn around time?->Rush (Cepheid 2-4 hours)   ESTIMATED GFR    Narrative:     Droplet, Contact, and Eye Protection   LACTIC ACID    Narrative:     Droplet, Contact, and Eye Protection     RADIOLOGY  DX-CHEST-PORTABLE (1 VIEW)   Final Result         1. No acute cardiopulmonary abnormalities are identified.          COURSE & MEDICAL DECISION MAKING  Pertinent Labs & Imaging studies reviewed. (See chart for details)    Differential diagnoses include but not limited to: Infection: r/o pneumonia, pyelonephritis, influenza, viral syndrome, soft tissue infection, intra-abdominal infection, lactic acidosis, dehydration     6:11 PM - Patient seen and examined at bedside. Patient will be treated with Tylenol 1000 mg and NS infusion 1000 mL. Ordered DX-chest, CBC w/ diff, CMP, blood cultures, COVID testing, UA w/ culture if indicated, and lactic acid to evaluate his symptoms.     6:54 PM - Patient reevaluated at bedside and updated on his current lab and " chest xray results.     8:00 PM - Per RN, the patient was able to ambulate around the ED normally, and his O2 saturations remained normal. As the patient was still slightly tachycardic on the monitor, we will continue to treat with IV fluids. Patient agrees with this plan of care.    9:26 PM - Per RN, patient is still unable to provide a urine sample after being given nearly 2L of IV fluids. Bladder scan ordered.     9:48 PM - Bladder scan was reported to show only 79 mL. Tachycardia has completely resolved with a heart rate of 77. Discussed the need to obtain a UA to r/o UTI. He will continue to drink PO fluids and receive IV fluids in order to provide a urine sample.    10:58 PM- Patient was reevaluated at bedside. There did not appear to be any signs of a gross infection on his UA. We discussed the plan to return for any new or worsening symptoms. Discussed isolation and social distancing protocols per current CDC guidelines. Patient verbalizes understanding and agreement to this plan of care.      HYDRATION: Based on the patient's presentation of Tachycardia the patient was given IV fluids. IV Hydration was used because oral hydration was not adequate alone. Upon recheck following hydration, the patient was improved.       Medical Decision Making:   Patient presents the emergency room for symptoms as described above.  On initial assessment he is ill-appearing but nontoxic.  He is in no acute respiratory distress but is borderline hypoxic at 89% on room air which was quickly corrected with supplemental oxygen.  He has some tachypnea and elevated fever but normal blood pressures and is tachycardic into the 120s.  He appears clinically dehydrated and is otherwise presenting for an acute onset of fevers, chills with a positive exposure to COVID-19 in his household.  Over the last several days he denies any other acute infectious sources and was otherwise doing well.  He is a diabetic with recent sugars that are  reasonable in the low 100s.    IV access established, labs drawn per sepsis protocol and a broad differential as noted above.  Initial lactate is 1.9, he has a leukocytosis with slight left shift and slight leukopenia which may be relative.  Chest x-ray is without any focal consolidations, evidence of fluid overload status.    Frequent reassessments are made and the patient's temperature is downtrending after receiving Tylenol, his pulse is starting to normalize after 1.5 L of normal saline.  Repeat Lactate is 1.0, with no breakthrough fevers or hypoxia on ambulation.  Pt feels improved and wishes to be discharged at this time.    Labs are discussed with the patient, initial COVID testing in-house is negative however clinical concerns remain higher for the possibility of this atypical viral exposure.  Initially presented with septic etiology though at this time he is very fluid responsive, has a sudden onset initiation with no acute evidence of bacterial etiology with normal neurological exams and repeat physical exams are reassuring.  With no developing hypoxia, response to medications, I do believe that he likely has a viral syndrome and does not currently require oxygen support.  This is discussed extensively with the patient and he feels very confident about the ability to handle this at home.  He understands that while his cover test is negative that ultimately he will need to isolate and presume that this is COVID due to his family exposure    FINAL IMPRESSION  Visit Diagnoses     ICD-10-CM   1. Fever, unspecified fever cause  R50.9   2. Tachycardia  R00.0   3. Suspected COVID-19 virus infection  R68.89   4. Viral illness  B34.9     Surya BLAKELY (Victoriano), am scribing for, and in the presence of, Charles Thornton M.D..    Electronically signed by: uSrya Nye (Victoriano), 7/7/2020    Charles BLAKELY M.D. personally performed the services described in this documentation, as scribed by Surya Nye in my  presence, and it is both accurate and complete.    The note accurately reflects work and decisions made by me.  Charles Thornton M.D.  7/7/2020  8:56 PM

## 2020-07-08 NOTE — DISCHARGE INSTRUCTIONS
You were evaluated in the Emergency department today for symptoms of a lower respiratory illness (fever, cough, shortness of breath).  Your evaluation may have included tests for viruses and you may have had a chest x-ray and/or received medications for your symptoms.  At this time, no clear cause of your symptoms has been identified.    At this time you do not meet our [facility] criteria for dedicated testing for COVID-19.    Please follow these instructions:    -- Perform basic infection control measures at home. These include frequent handwashing, avoiding touching your face, coughing or sneezing into a tissue, wearing a mask if you were advised to do so, and staying home from work or school if you have a persistent fever or cough. If you need a work or school note, please talk to your provider.    -- Drink plenty of fluids to stay hydrated, take any medications you were prescribed, and use over-the-counter medications for fever or body aches.  If you feel that you may have fever, it is a good idea to take your temperature at home and document the readings.  A temperature measurement of greater than 100.4 °F means that you have a fever.  For fevers, you may take 500 to 1000 mg of acetaminophen (Tylenol).  Please be careful that you do not take more than 4000 mg of acetaminophen per day; some over-the-counter medications for coughs and colds, including DayQuil or NyQuil, contain acetaminophen.

## 2020-07-08 NOTE — ED NOTES
Discharge instructions given to pt. Prescriptions unchanged. Pt educated, verbalizes understanding. All belongings accounted for. Pt ambulated out of ED with steady gait to go home. PIVx2 removed and dressing applied.

## 2020-07-08 NOTE — ED TRIAGE NOTES
Ambulatory to ED after he was at a meeting today and felt very fatigued. He checked his temperature and it was 104 about 2 hours ago. He did not take meds for it. Endorses nasea. Room air 02 sat 88%. Roomed to 23 immediately. Patient's wife is COVID positive

## 2020-07-13 LAB
BACTERIA BLD CULT: NORMAL
BACTERIA BLD CULT: NORMAL
SIGNIFICANT IND 70042: NORMAL
SIGNIFICANT IND 70042: NORMAL
SITE SITE: NORMAL
SITE SITE: NORMAL
SOURCE SOURCE: NORMAL
SOURCE SOURCE: NORMAL

## 2020-12-08 ENCOUNTER — APPOINTMENT (OUTPATIENT)
Dept: NEPHROLOGY | Facility: MEDICAL CENTER | Age: 70
End: 2020-12-08
Payer: MEDICARE

## 2020-12-15 ENCOUNTER — HOSPITAL ENCOUNTER (EMERGENCY)
Facility: MEDICAL CENTER | Age: 70
End: 2020-12-15
Attending: EMERGENCY MEDICINE
Payer: MEDICARE

## 2020-12-15 VITALS
TEMPERATURE: 97.3 F | OXYGEN SATURATION: 94 % | WEIGHT: 253.09 LBS | BODY MASS INDEX: 31.47 KG/M2 | SYSTOLIC BLOOD PRESSURE: 142 MMHG | DIASTOLIC BLOOD PRESSURE: 88 MMHG | HEART RATE: 97 BPM | RESPIRATION RATE: 16 BRPM | HEIGHT: 75 IN

## 2020-12-15 DIAGNOSIS — R04.0 EPISTAXIS: ICD-10-CM

## 2020-12-15 PROCEDURE — 303620 HCHG EPISTAXIS CONTROL

## 2020-12-15 PROCEDURE — 700111 HCHG RX REV CODE 636 W/ 250 OVERRIDE (IP): Performed by: EMERGENCY MEDICINE

## 2020-12-15 PROCEDURE — 700102 HCHG RX REV CODE 250 W/ 637 OVERRIDE(OP): Performed by: EMERGENCY MEDICINE

## 2020-12-15 PROCEDURE — A9270 NON-COVERED ITEM OR SERVICE: HCPCS | Performed by: EMERGENCY MEDICINE

## 2020-12-15 PROCEDURE — 99283 EMERGENCY DEPT VISIT LOW MDM: CPT

## 2020-12-15 PROCEDURE — 700101 HCHG RX REV CODE 250: Performed by: EMERGENCY MEDICINE

## 2020-12-15 RX ORDER — CLINDAMYCIN HYDROCHLORIDE 300 MG/1
300 CAPSULE ORAL 3 TIMES DAILY
Qty: 15 CAP | Refills: 0 | Status: SHIPPED | OUTPATIENT
Start: 2020-12-15 | End: 2020-12-20

## 2020-12-15 RX ORDER — LIDOCAINE HYDROCHLORIDE AND EPINEPHRINE 10; 10 MG/ML; UG/ML
20 INJECTION, SOLUTION INFILTRATION; PERINEURAL ONCE
Status: COMPLETED | OUTPATIENT
Start: 2020-12-15 | End: 2020-12-15

## 2020-12-15 RX ORDER — ONDANSETRON 4 MG/1
4 TABLET, ORALLY DISINTEGRATING ORAL ONCE
Status: COMPLETED | OUTPATIENT
Start: 2020-12-15 | End: 2020-12-15

## 2020-12-15 RX ADMIN — PHENYLEPHRINE HYDROCHLORIDE 1 SPRAY: 0.25 SPRAY NASAL at 20:40

## 2020-12-15 RX ADMIN — ONDANSETRON 4 MG: 4 TABLET, ORALLY DISINTEGRATING ORAL at 21:00

## 2020-12-15 RX ADMIN — LIDOCAINE HYDROCHLORIDE AND EPINEPHRINE 20 ML: 10; 10 INJECTION, SOLUTION INFILTRATION; PERINEURAL at 20:40

## 2020-12-15 ASSESSMENT — FIBROSIS 4 INDEX: FIB4 SCORE: 1.59

## 2020-12-16 NOTE — ED PROVIDER NOTES
ED Provider Note    CHIEF COMPLAINT  Chief Complaint   Patient presents with   • Epistaxis     started about 2 hours ago  unknown cause  no blood thinners        HPI  Dejan Mooney is a 70 y.o. male who presents to the emergency department for evaluation of epistaxis.    I was called to see this patient emergently because of active bleeding from the nose that was fairly brisk for the nursing staff.  The patient reports of bleeding from the right naris for the last 2 hours.  He states he has not had remote history of epistaxis but nothing this severe nothing this long.  He takes aspirin but denies any other blood thinners.  Denies any falls or trauma.  No fevers or chills or headache.  No other bleeding or bruising.  He denies any other acute concerns or complaints.    REVIEW OF SYSTEMS  See HPI for further details. All other systems are negative.    PAST MEDICAL HISTORY  Past Medical History:   Diagnosis Date   • BBB (bundle branch block)    • Breath shortness    • Congestive heart failure (HCC)     mild, cardiologist, Dr. Monster Klein   • Diabetes     insulin and oral medication   • Heart burn    • Hypertension    • Indigestion    • Pain     knee, shoulder   • Pneumonia 1974   • Psychiatric problem     depression   • Renal calculus 7/2013    kidney stones   • Snoring     sleep apnea questionairre completed       FAMILY HISTORY  No family history on file.    SOCIAL HISTORY  Social History     Socioeconomic History   • Marital status:      Spouse name: Not on file   • Number of children: Not on file   • Years of education: Not on file   • Highest education level: Not on file   Occupational History   • Not on file   Social Needs   • Financial resource strain: Not on file   • Food insecurity     Worry: Not on file     Inability: Not on file   • Transportation needs     Medical: Not on file     Non-medical: Not on file   Tobacco Use   • Smoking status: Never Smoker   • Smokeless tobacco: Never Used  "  Substance and Sexual Activity   • Alcohol use: No   • Drug use: No   • Sexual activity: Not on file   Lifestyle   • Physical activity     Days per week: Not on file     Minutes per session: Not on file   • Stress: Not on file   Relationships   • Social connections     Talks on phone: Not on file     Gets together: Not on file     Attends [employer] service: Not on file     Active member of club or organization: Not on file     Attends meetings of clubs or organizations: Not on file     Relationship status: Not on file   • Intimate partner violence     Fear of current or ex partner: Not on file     Emotionally abused: Not on file     Physically abused: Not on file     Forced sexual activity: Not on file   Other Topics Concern   • Not on file   Social History Narrative   • Not on file       SURGICAL HISTORY  Past Surgical History:   Procedure Laterality Date   • RECOVERY  5/13/2014    Performed by Cath-Recovery Surgery at SURGERY SAME DAY [facility] ORS   • OTHER  2/2014    right knee   • KNEE ARTHROSCOPY  8/22/2013    Performed by Maurisio Alfaro M.D. at SURGERY [facility]   • MEDIAL MENISCECTOMY  8/22/2013    Performed by Maurisio Alfaro M.D. at SURGERY [facility] ORS   • SHOULDER ARTHROTOMY  2003    right   • KNEE ARTHROSCOPY  1990    left   • KNEE ARTHROSCOPY  1985    right   • ANKLE ORIF  1984    left   • KNEE ORIF  1970    left   • TONSILLECTOMY  as child       CURRENT MEDICATIONS  Home Medications    **Home medications have not yet been reviewed for this encounter**     Medications on the nurses notes and have been reviewed.  No blood thinners.    ALLERGIES  Allergies   Allergen Reactions   • Pcn [Penicillins] Hives and Swelling       PHYSICAL EXAM  VITAL SIGNS: /94   Pulse 95   Temp 36.1 °C (97 °F) (Temporal)   Resp 16   Ht 1.905 m (6' 3\")   Wt 114.8 kg (253 lb 1.4 oz)   SpO2 97%   BMI 31.63 kg/m²      Constitutional: Wake alert anxious moderate distress from discomfort.  HENT: " Normocephalic, Atraumatic, Bilateral external ears normal,   Brisk bleeding coming out of  the right naris.  No sign of bleeding in the left.  Source of bleeding is not identified but appears anterior.  Eyes: PERRL, EOMI, Conjunctiva normal, No discharge.   Neck: Normal range of motion  Cardiovascular: Normal heart rate, Normal rhythm, No murmurs, No rubs, No gallops.   Thorax & Lungs: Normal breath sounds, No respiratory distress,  Abdomen: Bowel sounds normal, Soft, No tenderness  Skin: Warm, Dry, No erythema, No rash.   Back: No tenderness, No CVA tenderness.   Musculoskeletal: Good range of motion in all major joints.  Neurologic: Alert, No focal deficits noted.   Psychiatric: Affect anxious      RADIOLOGY/PROCEDURES  *  Management of epistaxis procedure  The patient is asked to blow his nose to remove all the clots.  Mukund-Synephrine sprayed in each nasal passage.  The nose is held closed by me for 5 minutes.  Discharge pressure is releasing still bleeding.  And that his right naris is packed with cotton ball soaked lidocaine with epinephrine.  Now the nurse held pressure for about 4 minutes.  Continues to bleed.  Bleeding is to breast identify the source of the bleeding.  It appears to be brisk arterial anterior epistaxis.  I then discussed packing it with him he is agreeable.  Nasal passages anesthetized with lidocaine from the packing with cotton.  A cotton is removed.  Antibiotic ointment is applied and a rapid Rhino balloon is placed.  The balloon is inflated with a small amount of saline but mostly air.  This is tolerated well.              COURSE & MEDICAL DECISION MAKING  Pertinent Labs & Imaging studies reviewed. (See chart for details)  I was called to see this patient because of brisk bleeding from what appears to be anterior epistaxis.  I am unable to isolate identify the source of the bleeding therefore I feel packing is in his best interest.    This is performed as above.  He is in observe prolonged  period time with bleeding that has gradually tapered off and stopped.    He is given oral Zofran because he was nauseated from swallowing some blood.  The patient has a penicillin allergy.  I will prescribe him 5 days of clindamycin.    The patient is aware he will return for pain, bleeding, or other concerns.  Follow-up with ENT in 3 to 5 days return for worsening symptoms or other concerns.  Questions are answered is agreeable to plan and is discharged in good condition.  He is given return precautions of epistaxis.    The patient will return to the emerge part if he has more bleeding or if he is unable to follow-up as packing removed by anyone else.      The patient was noted to have elevated blood pressure while in the ER and was counseled to see their doctor within one wee to have this rechecked.    Ancelmo Camargo M.D.  [address] [facility] [address]  [phone]    Schedule an appointment as soon as possible for a visit in 2 days      Darnell Hinson M.D.  [address] [facility] [id number] [phone]              FINAL IMPRESSION  1. Epistaxis     2.  Management of epistaxis with nasal packing.    2.   3.         Electronically signed by: Luisito Alejandre M.D., 12/15/2020 8:44 PM

## 2020-12-16 NOTE — ED NOTES
Discharge instructions provided. Pt educated on the importance of following up with ENT and to return to ED if bleeding reoccurs. Pt verbalized the understanding of discharge instructions to follow up with PCP, ENT and to return to ER if condition worsens.  Pt ambulated out of ER without difficulty.

## 2020-12-16 NOTE — ED TRIAGE NOTES
Nose bleeding started about 2 hours ago  Non stop  Has had bleeds in the past however nothing like this   Pt to RTA 16 now for further evaluation and treatment of such

## 2020-12-16 NOTE — DISCHARGE INSTRUCTIONS
The packing must remain in your nose for 3 to 5 days.  Follow-up with ear nose and throat, or your doctor or return to the ER for packing removal.  Return sooner for increasing pain, bleeding or other concerns.  Take antibiotics as prescribed.  Follow-up with your doctor.

## 2021-03-03 DIAGNOSIS — Z23 NEED FOR VACCINATION: ICD-10-CM

## 2021-08-24 ENCOUNTER — HOSPITAL ENCOUNTER (OUTPATIENT)
Dept: RADIOLOGY | Facility: MEDICAL CENTER | Age: 71
End: 2021-08-24
Attending: NURSE PRACTITIONER
Payer: MEDICARE

## 2021-08-24 ENCOUNTER — OFFICE VISIT (OUTPATIENT)
Dept: URGENT CARE | Facility: CLINIC | Age: 71
End: 2021-08-24
Payer: MEDICARE

## 2021-08-24 ENCOUNTER — APPOINTMENT (OUTPATIENT)
Dept: RADIOLOGY | Facility: IMAGING CENTER | Age: 71
End: 2021-08-24
Attending: NURSE PRACTITIONER
Payer: MEDICARE

## 2021-08-24 ENCOUNTER — HOSPITAL ENCOUNTER (OUTPATIENT)
Facility: MEDICAL CENTER | Age: 71
End: 2021-08-24
Attending: NURSE PRACTITIONER
Payer: MEDICARE

## 2021-08-24 VITALS
HEIGHT: 75 IN | TEMPERATURE: 98.2 F | OXYGEN SATURATION: 93 % | HEART RATE: 118 BPM | BODY MASS INDEX: 28.47 KG/M2 | SYSTOLIC BLOOD PRESSURE: 110 MMHG | RESPIRATION RATE: 14 BRPM | WEIGHT: 229 LBS | DIASTOLIC BLOOD PRESSURE: 70 MMHG

## 2021-08-24 DIAGNOSIS — R10.9 LEFT FLANK PAIN: ICD-10-CM

## 2021-08-24 DIAGNOSIS — R10.12 LEFT UPPER QUADRANT ABDOMINAL PAIN: ICD-10-CM

## 2021-08-24 DIAGNOSIS — R07.81 RIB PAIN ON RIGHT SIDE: ICD-10-CM

## 2021-08-24 DIAGNOSIS — Z87.442 HISTORY OF RENAL STONE: ICD-10-CM

## 2021-08-24 DIAGNOSIS — R31.9 HEMATURIA, UNSPECIFIED TYPE: ICD-10-CM

## 2021-08-24 DIAGNOSIS — R68.83 CHILLS: ICD-10-CM

## 2021-08-24 DIAGNOSIS — Z87.81 HISTORY OF RIB FRACTURE: ICD-10-CM

## 2021-08-24 PROBLEM — S83.249A TEAR OF MEDIAL MENISCUS OF KNEE: Status: ACTIVE | Noted: 2021-08-24

## 2021-08-24 PROBLEM — Z11.59 SPECIAL SCREENING EXAMINATION FOR VIRAL DISEASE: Status: ACTIVE | Noted: 2020-07-07

## 2021-08-24 PROBLEM — R80.9 PROTEINURIA: Status: ACTIVE | Noted: 2020-07-20

## 2021-08-24 PROBLEM — G62.9 NEUROPATHY: Status: ACTIVE | Noted: 2020-04-15

## 2021-08-24 PROBLEM — N18.9 CHRONIC RENAL FAILURE SYNDROME: Status: ACTIVE | Noted: 2020-07-20

## 2021-08-24 PROBLEM — R82.90 ABNORMAL URINALYSIS: Status: ACTIVE | Noted: 2020-07-20

## 2021-08-24 PROBLEM — L98.499 DIABETIC SKIN ULCER (HCC): Status: ACTIVE | Noted: 2017-09-12

## 2021-08-24 PROBLEM — G47.00 INSOMNIA: Status: ACTIVE | Noted: 2020-04-15

## 2021-08-24 PROBLEM — R50.9 FEVER OF UNKNOWN ORIGIN: Status: ACTIVE | Noted: 2020-07-07

## 2021-08-24 PROBLEM — E78.5 DYSLIPIDEMIA: Status: ACTIVE | Noted: 2021-08-11

## 2021-08-24 PROBLEM — G47.33 OBSTRUCTIVE SLEEP APNEA, ADULT: Status: ACTIVE | Noted: 2021-07-26

## 2021-08-24 PROBLEM — R53.83 FATIGUE: Status: ACTIVE | Noted: 2020-04-15

## 2021-08-24 PROBLEM — Z12.11 ENCOUNTER FOR SCREENING FOR MALIGNANT NEOPLASM OF COLON: Status: ACTIVE | Noted: 2021-01-13

## 2021-08-24 PROBLEM — I25.10 CORONARY ARTERY DISEASE: Status: ACTIVE | Noted: 2017-06-13

## 2021-08-24 PROBLEM — E11.622 DIABETIC SKIN ULCER (HCC): Status: ACTIVE | Noted: 2017-09-12

## 2021-08-24 PROBLEM — R10.11 ABDOMINAL PAIN, RIGHT UPPER QUADRANT: Status: ACTIVE | Noted: 2021-07-26

## 2021-08-24 PROBLEM — I10 HYPERTENSION: Status: ACTIVE | Noted: 2017-06-13

## 2021-08-24 PROBLEM — K21.9 GASTROESOPHAGEAL REFLUX DISEASE: Status: ACTIVE | Noted: 2021-03-11

## 2021-08-24 PROBLEM — E11.41 TYPE 2 DIABETES MELLITUS WITH DIABETIC MONONEUROPATHY (HCC): Status: ACTIVE | Noted: 2017-09-12

## 2021-08-24 LAB
APPEARANCE UR: NORMAL
BILIRUB UR STRIP-MCNC: NEGATIVE MG/DL
COLOR UR AUTO: NORMAL
COVID ORDER STATUS COVID19: NORMAL
FLUAV+FLUBV AG SPEC QL IA: NEGATIVE
GLUCOSE UR STRIP.AUTO-MCNC: NEGATIVE MG/DL
INT CON NEG: NORMAL
INT CON POS: NORMAL
KETONES UR STRIP.AUTO-MCNC: NEGATIVE MG/DL
LEUKOCYTE ESTERASE UR QL STRIP.AUTO: NEGATIVE
NITRITE UR QL STRIP.AUTO: NEGATIVE
PH UR STRIP.AUTO: 5.5 [PH] (ref 5–8)
PROT UR QL STRIP: 100 MG/DL
RBC UR QL AUTO: NORMAL
SARS-COV-2 RNA RESP QL NAA+PROBE: NOTDETECTED
SP GR UR STRIP.AUTO: 1.02
SPECIMEN SOURCE: NORMAL
UROBILINOGEN UR STRIP-MCNC: 0.2 MG/DL

## 2021-08-24 PROCEDURE — 87077 CULTURE AEROBIC IDENTIFY: CPT

## 2021-08-24 PROCEDURE — 87804 INFLUENZA ASSAY W/OPTIC: CPT | Performed by: NURSE PRACTITIONER

## 2021-08-24 PROCEDURE — 71101 X-RAY EXAM UNILAT RIBS/CHEST: CPT | Mod: TC,RT | Performed by: NURSE PRACTITIONER

## 2021-08-24 PROCEDURE — 74176 CT ABD & PELVIS W/O CONTRAST: CPT | Mod: ME

## 2021-08-24 PROCEDURE — 99203 OFFICE O/P NEW LOW 30 MIN: CPT | Performed by: NURSE PRACTITIONER

## 2021-08-24 PROCEDURE — U0005 INFEC AGEN DETEC AMPLI PROBE: HCPCS

## 2021-08-24 PROCEDURE — 87086 URINE CULTURE/COLONY COUNT: CPT

## 2021-08-24 PROCEDURE — 87186 SC STD MICRODIL/AGAR DIL: CPT

## 2021-08-24 PROCEDURE — U0003 INFECTIOUS AGENT DETECTION BY NUCLEIC ACID (DNA OR RNA); SEVERE ACUTE RESPIRATORY SYNDROME CORONAVIRUS 2 (SARS-COV-2) (CORONAVIRUS DISEASE [COVID-19]), AMPLIFIED PROBE TECHNIQUE, MAKING USE OF HIGH THROUGHPUT TECHNOLOGIES AS DESCRIBED BY CMS-2020-01-R: HCPCS

## 2021-08-24 PROCEDURE — 81002 URINALYSIS NONAUTO W/O SCOPE: CPT | Performed by: NURSE PRACTITIONER

## 2021-08-24 RX ORDER — SULFAMETHOXAZOLE AND TRIMETHOPRIM 800; 160 MG/1; MG/1
1 TABLET ORAL 2 TIMES DAILY
Qty: 14 TABLET | Refills: 0 | Status: SHIPPED | OUTPATIENT
Start: 2021-08-24 | End: 2021-08-31

## 2021-08-24 RX ORDER — SEMAGLUTIDE 1.34 MG/ML
INJECTION, SOLUTION SUBCUTANEOUS
COMMUNITY
Start: 2020-02-25 | End: 2023-12-13 | Stop reason: SDUPTHER

## 2021-08-24 RX ORDER — AMLODIPINE BESYLATE 2.5 MG/1
TABLET ORAL
COMMUNITY
Start: 2021-01-13 | End: 2021-12-20

## 2021-08-24 RX ORDER — ZOLPIDEM TARTRATE 10 MG/1
TABLET ORAL
COMMUNITY
Start: 2021-07-17 | End: 2022-02-07

## 2021-08-24 ASSESSMENT — ENCOUNTER SYMPTOMS
FLANK PAIN: 1
CHILLS: 1
FEVER: 0

## 2021-08-24 ASSESSMENT — FIBROSIS 4 INDEX: FIB4 SCORE: 1.61

## 2021-08-24 NOTE — PROGRESS NOTES
Subjective     Dejan Mooney is a 71 y.o. male who presents with Flu Like Symptoms (headache, low oxygen, feverish, body aches, chills  x3days, suspect pos flu or covid, had vaccine) and Back Pain (middle back pain on left side, frequency, discomfort and irritation on the penis, lower abd discomfort)            Chills  This is a new problem. Episode onset: pt reports new onset of chills that started 3 days ago. +mild nausea and body aches. He does report they were with their grandkids 8 days ago who were sick with coughing, runny nose.  Associated symptoms include chills. Pertinent negatives include no fever. Associated symptoms comments: He does report some new pain with urination that started about 5 days ago. He admits he drank some cranberry juice and most of the pain with urination improved. Admits his kidneys feel sore. He reports hx of renal infections and renal stones, concerned this may be happening as well. Pt also endorses right sided rib pain. He reports hx of rib fracture to this area a few years ago but admits this is still bothering him and is painful. His PCP was supposed to order films but hasn't yet and he is requesting this be done today. He has tried acetaminophen, NSAIDs and rest for the symptoms. The treatment provided mild relief.       Review of Systems   Constitutional: Positive for chills. Negative for fever.   Genitourinary: Positive for flank pain (left flank).   Musculoskeletal:        Right rib pain   All other systems reviewed and are negative.         Past Medical History:   Diagnosis Date   • BBB (bundle branch block)    • Breath shortness    • Congestive heart failure (HCC)     mild, cardiologist, Dr. Monster Klein   • Coronary artery disease 6/13/2017   • Diabetes     insulin and oral medication   • Heart burn    • Hypertension    • Indigestion    • Pain     knee, shoulder   • Pneumonia 1974   • Psychiatric problem     depression   • Renal calculus 7/2013    kidney  stones   • Snoring     sleep apnea questionairre completed      Past Surgical History:   Procedure Laterality Date   • RECOVERY  5/13/2014    Performed by [facility] at SURGERY SAME DAY [facility] ORS   • OTHER  2/2014    right knee   • KNEE ARTHROSCOPY  8/22/2013    Performed by Maurisio Alfaro M.D. at SURGERY [facility]   • MEDIAL MENISCECTOMY  8/22/2013    Performed by Maurisio Alfaro M.D. at SURGERY [facility]   • SHOULDER ARTHROTOMY  2003    right   • KNEE ARTHROSCOPY  1990    left   • KNEE ARTHROSCOPY  1985    right   • ANKLE ORIF  1984    left   • KNEE ORIF  1970    left   • TONSILLECTOMY  as child      Social History     Socioeconomic History   • Marital status:      Spouse name: Not on file   • Number of children: Not on file   • Years of education: Not on file   • Highest education level: Not on file   Occupational History   • Not on file   Tobacco Use   • Smoking status: Never Smoker   • Smokeless tobacco: Never Used   Substance and Sexual Activity   • Alcohol use: No   • Drug use: No   • Sexual activity: Not on file   Other Topics Concern   • Not on file   Social History Narrative   • Not on file     Social Determinants of Health     Financial Resource Strain:    • Difficulty of Paying Living Expenses:    Food Insecurity:    • Worried About Running Out of Food in the Last Year:    • Ran Out of Food in the Last Year:    Transportation Needs:    • Lack of Transportation (Medical):    • Lack of Transportation (Non-Medical):    Physical Activity:    • Days of Exercise per Week:    • Minutes of Exercise per Session:    Stress:    • Feeling of Stress :    Social Connections:    • Frequency of Communication with Friends and Family:    • Frequency of Social Gatherings with Friends and Family:    • Attends Muslim Services:    • Active Member of Clubs or Organizations:    • Attends Club or Organization Meetings:    • Marital Status:    Intimate Partner Violence:    • Fear  "of Current or Ex-Partner:    • Emotionally Abused:    • Physically Abused:    • Sexually Abused:          Objective     /70 (BP Location: Left arm, Patient Position: Sitting, BP Cuff Size: Large adult)   Pulse (!) 118   Temp 36.8 °C (98.2 °F) (Temporal)   Resp 14   Ht 1.905 m (6' 3\")   Wt 104 kg (229 lb)   SpO2 93%   BMI 28.62 kg/m²      Physical Exam  Vitals and nursing note reviewed.   Constitutional:       Appearance: Normal appearance.   HENT:      Head: Normocephalic and atraumatic.      Nose: Nose normal.      Mouth/Throat:      Mouth: Mucous membranes are moist.      Pharynx: Oropharynx is clear.   Eyes:      Extraocular Movements: Extraocular movements intact.      Pupils: Pupils are equal, round, and reactive to light.   Cardiovascular:      Rate and Rhythm: Normal rate and regular rhythm.   Pulmonary:      Effort: Pulmonary effort is normal.   Abdominal:      Tenderness: There is no abdominal tenderness. There is left CVA tenderness (mild). There is no right CVA tenderness.       Musculoskeletal:         General: Normal range of motion.      Cervical back: Normal range of motion and neck supple.   Skin:     General: Skin is warm and dry.      Capillary Refill: Capillary refill takes less than 2 seconds.   Neurological:      General: No focal deficit present.      Mental Status: He is alert and oriented to person, place, and time. Mental status is at baseline.   Psychiatric:         Mood and Affect: Mood normal.         Thought Content: Thought content normal.         Judgment: Judgment normal.                Right basilar atelectasis           HISTORY/REASON FOR EXAM:  Rib pain, atraumatic inferiorly on the right     TECHNIQUE/EXAM DESCRIPTION AND NUMBER OF VIEWS:  3 images of the RIGHT ribs and chest.     COMPARISON: 7/7/2020     FINDINGS:  No acute displaced fracture is seen.     Right sixth anterior rib end at the costochondral junction shows evidence of a chronic fracture with mild " displacement.     No pneumothorax.     There is new mild right lateral pleural space thickening     No consolidation.     There is some linear right mid and lower lung zone opacity consistent with atelectasis favored over scarring     Normal cardiomediastinal contours.     IMPRESSION:     No radiographic evidence of acute bony abnormality     Right lateral pleural space thickening is nonspecific. Pleural fluid or extrapleural soft tissue thickening are possible. If symptoms persist, CT thorax with contrast could be obtained to further evaluate     Right basilar atelectasis     Chronic right sixth rib fracture         Lab Results   Component Value Date/Time    POCCOLOR dark yellow 08/24/2021 08:20 AM    POCAPPEAR turbid 08/24/2021 08:20 AM    POCLEUKEST negative 08/24/2021 08:20 AM    POCNITRITE negative 08/24/2021 08:20 AM    POCUROBILIGE 0.2 08/24/2021 08:20 AM    POCPROTEIN 100 08/24/2021 08:20 AM    POCURPH 5.5 08/24/2021 08:20 AM    POCBLOOD trace-intact 08/24/2021 08:20 AM    POCSPGRV 1.020 08/24/2021 08:20 AM    POCKETONES negative 08/24/2021 08:20 AM    POCBILIRUBIN negative 08/24/2021 08:20 AM    POCGLUCUA negative 08/24/2021 08:20 AM          Assessment & Plan        1. Left flank pain  - POCT Urinalysis  - URINE CULTURE(NEW); Future  - sulfamethoxazole-trimethoprim (BACTRIM DS) 800-160 MG tablet; Take 1 Tablet by mouth 2 times a day for 7 days.  Dispense: 14 Tablet; Refill: 0    2. History of renal stone    3. Hematuria, unspecified type  - URINE CULTURE(NEW); Future  - sulfamethoxazole-trimethoprim (BACTRIM DS) 800-160 MG tablet; Take 1 Tablet by mouth 2 times a day for 7 days.  Dispense: 14 Tablet; Refill: 0    4. Left upper quadrant abdominal pain  - CT-RENAL COLIC EVALUATION(A/P W/O); Future    5. Chills  - POCT Influenza A/B NEGATIVE  - COVID/SARS CoV-2 PCR; Future    6. Rib pain on right side  - NZ-[id number] (WITH 1-VIEW CXR) RIGHT; Future    7. History of rib fracture    Will start patient on  abx for suspected UTI/early pyelo  Advised pt I will contact him in a few days if I need to stop his abx based on his urine cx results. He understands and agrees  OTC ibuprofen and tylenol as needed for pain  Increase water intake  Encouraged rest and fluids  Red flags discussed and when to seek care in the ER  Supportive care, differential diagnoses, and indications for immediate follow-up discussed with patient.    Pathogenesis of diagnosis discussed including typical length and natural progression.      Instructed to return to  or nearest emergency department if symptoms fail to improve, for any change in condition, further concerns, or new concerning symptoms.  Patient states understanding of the plan of care and discharge instructions.

## 2021-08-27 LAB
BACTERIA UR CULT: ABNORMAL
BACTERIA UR CULT: ABNORMAL
SIGNIFICANT IND 70042: ABNORMAL
SITE SITE: ABNORMAL
SOURCE SOURCE: ABNORMAL

## 2021-08-28 RX ORDER — SITAGLIPTIN AND METFORMIN HYDROCHLORIDE 1000; 50 MG/1; MG/1
1 TABLET, FILM COATED ORAL DAILY
COMMUNITY
End: 2022-02-07

## 2021-08-28 RX ORDER — PREGABALIN 75 MG/1
1 CAPSULE ORAL 3 TIMES DAILY
COMMUNITY
Start: 2021-01-13 | End: 2022-02-07

## 2021-09-07 ENCOUNTER — TELEPHONE (OUTPATIENT)
Dept: NEPHROLOGY | Facility: MEDICAL CENTER | Age: 71
End: 2021-09-07

## 2021-09-07 DIAGNOSIS — I10 ESSENTIAL HYPERTENSION: ICD-10-CM

## 2021-09-07 DIAGNOSIS — N18.9 CHRONIC KIDNEY DISEASE, UNSPECIFIED CKD STAGE: ICD-10-CM

## 2021-09-23 LAB
ALBUMIN/CREAT UR: 601 MG/G CREAT (ref 0–29)
BUN SERPL-MCNC: 18 MG/DL (ref 8–27)
BUN/CREAT SERPL: 23 (ref 10–24)
CALCIUM SERPL-MCNC: 9.2 MG/DL (ref 8.6–10.2)
CHLORIDE SERPL-SCNC: 101 MMOL/L (ref 96–106)
CO2 SERPL-SCNC: 26 MMOL/L (ref 20–29)
CREAT SERPL-MCNC: 0.77 MG/DL (ref 0.76–1.27)
CREAT UR-MCNC: 157.2 MG/DL
ERYTHROCYTE [DISTWIDTH] IN BLOOD BY AUTOMATED COUNT: 13.7 % (ref 11.6–15.4)
GLUCOSE SERPL-MCNC: 178 MG/DL (ref 65–99)
HCT VFR BLD AUTO: 44.1 % (ref 37.5–51)
HGB BLD-MCNC: 14.6 G/DL (ref 13–17.7)
MCH RBC QN AUTO: 30.1 PG (ref 26.6–33)
MCHC RBC AUTO-ENTMCNC: 33.1 G/DL (ref 31.5–35.7)
MCV RBC AUTO: 91 FL (ref 79–97)
MICROALBUMIN UR-MCNC: 944 UG/ML
NRBC BLD AUTO-RTO: NORMAL %
PLATELET # BLD AUTO: 151 X10E3/UL (ref 150–450)
POTASSIUM SERPL-SCNC: 4.3 MMOL/L (ref 3.5–5.2)
RBC # BLD AUTO: 4.85 X10E6/UL (ref 4.14–5.8)
SODIUM SERPL-SCNC: 139 MMOL/L (ref 134–144)
WBC # BLD AUTO: 5 X10E3/UL (ref 3.4–10.8)

## 2021-09-29 ENCOUNTER — OFFICE VISIT (OUTPATIENT)
Dept: NEPHROLOGY | Facility: MEDICAL CENTER | Age: 71
End: 2021-09-29
Payer: MEDICARE

## 2021-09-29 VITALS
WEIGHT: 239 LBS | RESPIRATION RATE: 18 BRPM | TEMPERATURE: 97.2 F | DIASTOLIC BLOOD PRESSURE: 70 MMHG | HEART RATE: 74 BPM | OXYGEN SATURATION: 94 % | HEIGHT: 75 IN | BODY MASS INDEX: 29.72 KG/M2 | SYSTOLIC BLOOD PRESSURE: 120 MMHG

## 2021-09-29 DIAGNOSIS — Z79.4 TYPE 2 DIABETES MELLITUS WITH MICROALBUMINURIA, WITH LONG-TERM CURRENT USE OF INSULIN (HCC): ICD-10-CM

## 2021-09-29 DIAGNOSIS — R80.9 PROTEINURIA, UNSPECIFIED TYPE: ICD-10-CM

## 2021-09-29 DIAGNOSIS — E11.29 TYPE 2 DIABETES MELLITUS WITH MICROALBUMINURIA, WITH LONG-TERM CURRENT USE OF INSULIN (HCC): ICD-10-CM

## 2021-09-29 DIAGNOSIS — N18.9 CHRONIC KIDNEY DISEASE, UNSPECIFIED CKD STAGE: ICD-10-CM

## 2021-09-29 DIAGNOSIS — I10 ESSENTIAL HYPERTENSION: ICD-10-CM

## 2021-09-29 DIAGNOSIS — N20.0 NEPHROLITHIASIS: ICD-10-CM

## 2021-09-29 DIAGNOSIS — R80.9 TYPE 2 DIABETES MELLITUS WITH MICROALBUMINURIA, WITH LONG-TERM CURRENT USE OF INSULIN (HCC): ICD-10-CM

## 2021-09-29 PROCEDURE — 99214 OFFICE O/P EST MOD 30 MIN: CPT | Performed by: INTERNAL MEDICINE

## 2021-09-29 ASSESSMENT — ENCOUNTER SYMPTOMS
NAUSEA: 0
HYPERTENSION: 1
VOMITING: 0
COUGH: 0
SHORTNESS OF BREATH: 0
CHILLS: 0
FEVER: 0

## 2021-09-29 ASSESSMENT — FIBROSIS 4 INDEX: FIB4 SCORE: 1.66

## 2021-09-29 NOTE — PROGRESS NOTES
"Subjective     Flip Mooney is a 71 y.o. male who presents with Hypertension and Chronic Kidney Disease            Hypertension  This is a chronic problem. The current episode started more than 1 year ago. The problem is unchanged. The problem is controlled. Pertinent negatives include no chest pain, malaise/fatigue, peripheral edema or shortness of breath. Risk factors for coronary artery disease include male gender and diabetes mellitus. Past treatments include angiotensin blockers and calcium channel blockers. The current treatment provides significant improvement. There are no compliance problems.  Hypertensive end-organ damage includes kidney disease. Identifiable causes of hypertension include chronic renal disease.   Chronic Kidney Disease  This is a chronic problem. The current episode started more than 1 year ago. The problem occurs constantly. The problem has been unchanged. Pertinent negatives include no chest pain, chills, coughing, fever, nausea, urinary symptoms or vomiting.       Review of Systems   Constitutional: Negative for chills, fever and malaise/fatigue.   Respiratory: Negative for cough and shortness of breath.    Cardiovascular: Negative for chest pain and leg swelling.   Gastrointestinal: Negative for nausea and vomiting.   Genitourinary: Negative for dysuria, frequency and urgency.              Objective     /70 (BP Location: Right arm, Patient Position: Sitting)   Pulse 74   Temp 36.2 °C (97.2 °F) (Temporal)   Resp 18   Ht 1.905 m (6' 3\")   Wt 108 kg (239 lb)   SpO2 94%   BMI 29.87 kg/m²      Physical Exam  Vitals and nursing note reviewed.   Constitutional:       General: He is not in acute distress.     Appearance: He is not ill-appearing.   HENT:      Head: Normocephalic and atraumatic.      Right Ear: External ear normal.      Left Ear: External ear normal.      Nose: Nose normal.   Eyes:      General:         Right eye: No discharge.         Left eye: No discharge. "      Conjunctiva/sclera: Conjunctivae normal.   Cardiovascular:      Rate and Rhythm: Normal rate and regular rhythm.      Heart sounds: No murmur heard.     Pulmonary:      Effort: Pulmonary effort is normal. No respiratory distress.      Breath sounds: Normal breath sounds. No wheezing.   Musculoskeletal:         General: No tenderness or deformity.      Right lower leg: No edema.      Left lower leg: No edema.   Skin:     General: Skin is warm.   Neurological:      General: No focal deficit present.      Mental Status: He is alert and oriented to person, place, and time.   Psychiatric:         Mood and Affect: Mood normal.         Behavior: Behavior normal.       Past Medical History:   Diagnosis Date   • BBB (bundle branch block)    • Breath shortness    • Congestive heart failure (HCC)     mild, cardiologist, Dr. Monster Klein   • Coronary artery disease 6/13/2017   • Diabetes     insulin and oral medication   • Heart burn    • Hypertension    • Indigestion    • Pain     knee, shoulder   • Pneumonia 1974   • Psychiatric problem     depression   • Renal calculus 7/2013    kidney stones   • Snoring     sleep apnea questionairre completed     Social History     Socioeconomic History   • Marital status:      Spouse name: Not on file   • Number of children: Not on file   • Years of education: Not on file   • Highest education level: Not on file   Occupational History   • Not on file   Tobacco Use   • Smoking status: Never Smoker   • Smokeless tobacco: Never Used   Substance and Sexual Activity   • Alcohol use: No   • Drug use: No   • Sexual activity: Not on file   Other Topics Concern   • Not on file   Social History Narrative   • Not on file     Social Determinants of Health     Financial Resource Strain:    • Difficulty of Paying Living Expenses:    Food Insecurity:    • Worried About Running Out of Food in the Last Year:    • Ran Out of Food in the Last Year:    Transportation Needs:    • Lack of  Transportation (Medical):    • Lack of Transportation (Non-Medical):    Physical Activity:    • Days of Exercise per Week:    • Minutes of Exercise per Session:    Stress:    • Feeling of Stress :    Social Connections:    • Frequency of Communication with Friends and Family:    • Frequency of Social Gatherings with Friends and Family:    • Attends Oriental orthodox Services:    • Active Member of Clubs or Organizations:    • Attends Club or Organization Meetings:    • Marital Status:    Intimate Partner Violence:    • Fear of Current or Ex-Partner:    • Emotionally Abused:    • Physically Abused:    • Sexually Abused:      History reviewed. No pertinent family history.  Recent Labs     09/22/21  0901   SODIUM 139   POTASSIUM 4.3   CHLORIDE 101   CO2 26   BUN 18   CREATININE 0.77                           Assessment & Plan        1. Essential hypertension  Controlled  Continue same medication regimen  Continue low-sodium diet      2. Chronic kidney disease, unspecified CKD stage  Secondary to diabetic nephropathy  Kidney function is preserved   Avoid nephrotoxins like NSAIDs  Check labs annually    3. Proteinuria, unspecified type  Secondary to diabetic nephropathy  Continue ARB    4. Type 2 diabetes mellitus with microalbuminuria, with long-term current use of insulin (HCC)  Continue to optimize diabetes control for hemoglobin A1c below 7%     5 Nephrolithiasis  Patient was advised to be on low-sodium diet  Increase water intake

## 2021-12-20 RX ORDER — AMLODIPINE BESYLATE 2.5 MG/1
TABLET ORAL
Qty: 90 TABLET | Refills: 1 | Status: SHIPPED | OUTPATIENT
Start: 2021-12-20 | End: 2022-02-07

## 2021-12-20 NOTE — TELEPHONE ENCOUNTER
Last seen: 09/22/2021 by Dr. Kraft  Next appt: None    Was the patient seen in the last year in this department? Yes   Does patient have an active prescription for medications requested? No   Received Request Via: Pharmacy

## 2022-02-02 ENCOUNTER — TELEPHONE (OUTPATIENT)
Dept: CARDIOLOGY | Facility: MEDICAL CENTER | Age: 72
End: 2022-02-02

## 2022-02-02 NOTE — TELEPHONE ENCOUNTER
Please call Mr. Mooney and schedule him with me for a new patient appointment as his convenience for the diagnosis of coronary artery disease. Thank you!

## 2022-02-07 ENCOUNTER — TELEPHONE (OUTPATIENT)
Dept: CARDIOLOGY | Facility: MEDICAL CENTER | Age: 72
End: 2022-02-07

## 2022-02-07 ENCOUNTER — OFFICE VISIT (OUTPATIENT)
Dept: INTERNAL MEDICINE | Facility: OTHER | Age: 72
End: 2022-02-07
Payer: MEDICARE

## 2022-02-07 VITALS
HEART RATE: 79 BPM | WEIGHT: 250 LBS | TEMPERATURE: 99.6 F | HEIGHT: 75 IN | OXYGEN SATURATION: 97 % | SYSTOLIC BLOOD PRESSURE: 159 MMHG | BODY MASS INDEX: 31.08 KG/M2 | DIASTOLIC BLOOD PRESSURE: 87 MMHG

## 2022-02-07 DIAGNOSIS — N18.2 STAGE 2 CHRONIC KIDNEY DISEASE: ICD-10-CM

## 2022-02-07 DIAGNOSIS — K21.9 GASTROESOPHAGEAL REFLUX DISEASE, UNSPECIFIED WHETHER ESOPHAGITIS PRESENT: ICD-10-CM

## 2022-02-07 DIAGNOSIS — E78.5 DYSLIPIDEMIA: ICD-10-CM

## 2022-02-07 DIAGNOSIS — I25.84 CORONARY ARTERY DISEASE DUE TO CALCIFIED CORONARY LESION: ICD-10-CM

## 2022-02-07 DIAGNOSIS — Z00.00 PREVENTATIVE HEALTH CARE: ICD-10-CM

## 2022-02-07 DIAGNOSIS — Z79.4 TYPE 2 DIABETES MELLITUS WITH DIABETIC MONONEUROPATHY, WITH LONG-TERM CURRENT USE OF INSULIN (HCC): ICD-10-CM

## 2022-02-07 DIAGNOSIS — I10 PRIMARY HYPERTENSION: ICD-10-CM

## 2022-02-07 DIAGNOSIS — F51.01 PRIMARY INSOMNIA: ICD-10-CM

## 2022-02-07 DIAGNOSIS — E11.41 TYPE 2 DIABETES MELLITUS WITH DIABETIC MONONEUROPATHY, WITH LONG-TERM CURRENT USE OF INSULIN (HCC): ICD-10-CM

## 2022-02-07 DIAGNOSIS — I25.10 CORONARY ARTERY DISEASE DUE TO CALCIFIED CORONARY LESION: ICD-10-CM

## 2022-02-07 PROBLEM — R50.9 FEVER OF UNKNOWN ORIGIN: Status: RESOLVED | Noted: 2020-07-07 | Resolved: 2022-02-07

## 2022-02-07 PROBLEM — E11.622 DIABETIC SKIN ULCER (HCC): Status: RESOLVED | Noted: 2017-09-12 | Resolved: 2022-02-07

## 2022-02-07 PROBLEM — E66.9 OBESITY WITH BODY MASS INDEX 30 OR GREATER: Status: ACTIVE | Noted: 2022-02-07

## 2022-02-07 PROBLEM — L98.499 DIABETIC SKIN ULCER (HCC): Status: RESOLVED | Noted: 2017-09-12 | Resolved: 2022-02-07

## 2022-02-07 PROBLEM — R10.11 ABDOMINAL PAIN, RIGHT UPPER QUADRANT: Status: RESOLVED | Noted: 2021-07-26 | Resolved: 2022-02-07

## 2022-02-07 PROCEDURE — 99214 OFFICE O/P EST MOD 30 MIN: CPT | Mod: GC | Performed by: STUDENT IN AN ORGANIZED HEALTH CARE EDUCATION/TRAINING PROGRAM

## 2022-02-07 PROCEDURE — 99999 PR NO CHARGE: CPT | Performed by: STUDENT IN AN ORGANIZED HEALTH CARE EDUCATION/TRAINING PROGRAM

## 2022-02-07 RX ORDER — AMLODIPINE BESYLATE 2.5 MG/1
TABLET ORAL
COMMUNITY
Start: 2021-06-19 | End: 2022-02-07

## 2022-02-07 RX ORDER — CIPROFLOXACIN 250 MG/1
TABLET, FILM COATED ORAL
COMMUNITY
Start: 2021-11-24 | End: 2022-02-07

## 2022-02-07 RX ORDER — PREGABALIN 75 MG/1
75 CAPSULE ORAL 2 TIMES DAILY
Qty: 60 CAPSULE | Refills: 0 | Status: SHIPPED | OUTPATIENT
Start: 2022-02-07 | End: 2022-03-09

## 2022-02-07 RX ORDER — ATORVASTATIN CALCIUM 40 MG/1
40 TABLET, FILM COATED ORAL NIGHTLY
Qty: 90 TABLET | Refills: 1 | Status: SHIPPED | OUTPATIENT
Start: 2022-02-07 | End: 2022-02-22

## 2022-02-07 RX ORDER — ATORVASTATIN CALCIUM 40 MG/1
40 TABLET, FILM COATED ORAL NIGHTLY
Qty: 90 TABLET | Refills: 1 | Status: SHIPPED | OUTPATIENT
Start: 2022-02-07 | End: 2022-02-07 | Stop reason: SDUPTHER

## 2022-02-07 RX ORDER — AMLODIPINE BESYLATE 5 MG/1
5 TABLET ORAL DAILY
COMMUNITY
End: 2022-02-07

## 2022-02-07 RX ORDER — FLASH GLUCOSE SENSOR
KIT MISCELLANEOUS
COMMUNITY
Start: 2021-09-02

## 2022-02-07 RX ORDER — ZOLPIDEM TARTRATE 10 MG/1
TABLET ORAL
COMMUNITY
Start: 2021-07-17 | End: 2022-02-07

## 2022-02-07 RX ORDER — AMLODIPINE BESYLATE 10 MG/1
5 TABLET ORAL DAILY
COMMUNITY
End: 2022-02-22

## 2022-02-07 RX ORDER — EZETIMIBE 10 MG/1
TABLET ORAL
COMMUNITY
End: 2022-02-07

## 2022-02-07 RX ORDER — ATORVASTATIN CALCIUM 40 MG/1
40 TABLET, FILM COATED ORAL NIGHTLY
Qty: 30 TABLET | Refills: 2 | Status: SHIPPED | OUTPATIENT
Start: 2022-02-07 | End: 2022-02-07

## 2022-02-07 RX ORDER — IRBESARTAN 150 MG/1
300 TABLET ORAL DAILY
Qty: 180 TABLET | Refills: 1 | Status: SHIPPED | OUTPATIENT
Start: 2022-02-07 | End: 2022-02-22

## 2022-02-07 RX ORDER — HYDROCHLOROTHIAZIDE 25 MG/1
TABLET ORAL
COMMUNITY
Start: 2022-01-15 | End: 2022-02-07

## 2022-02-07 RX ORDER — HYDROCHLOROTHIAZIDE 25 MG/1
25 TABLET ORAL DAILY
COMMUNITY
End: 2022-02-07 | Stop reason: SDUPTHER

## 2022-02-07 RX ORDER — ZOLPIDEM TARTRATE 10 MG/1
10 TABLET ORAL NIGHTLY PRN
Qty: 30 TABLET | Refills: 0 | Status: SHIPPED | OUTPATIENT
Start: 2022-02-07 | End: 2022-03-09

## 2022-02-07 RX ORDER — OMEPRAZOLE 20 MG/1
40 CAPSULE, DELAYED RELEASE ORAL DAILY
COMMUNITY
End: 2022-03-16

## 2022-02-07 RX ORDER — HYDROCHLOROTHIAZIDE 25 MG/1
25 TABLET ORAL DAILY
Qty: 90 TABLET | Refills: 1 | Status: SHIPPED | OUTPATIENT
Start: 2022-02-07 | End: 2022-02-22

## 2022-02-07 RX ORDER — PEN NEEDLE, DIABETIC 32 GX 1/4"
NEEDLE, DISPOSABLE MISCELLANEOUS
COMMUNITY
Start: 2022-01-11

## 2022-02-07 ASSESSMENT — ENCOUNTER SYMPTOMS
ABDOMINAL PAIN: 0
COUGH: 0
PALPITATIONS: 0
NAUSEA: 0
VOMITING: 0
HEARTBURN: 1
CHILLS: 0
WEAKNESS: 0
SENSORY CHANGE: 1
INSOMNIA: 1
DIZZINESS: 0
SHORTNESS OF BREATH: 0
DEPRESSION: 0
FEVER: 0

## 2022-02-07 ASSESSMENT — FIBROSIS 4 INDEX: FIB4 SCORE: 1.66

## 2022-02-07 ASSESSMENT — PATIENT HEALTH QUESTIONNAIRE - PHQ9: CLINICAL INTERPRETATION OF PHQ2 SCORE: 0

## 2022-02-07 NOTE — ASSESSMENT & PLAN NOTE
Has difficulty falling and staying asleep, he wakes up unrefreshed.  This is worse since he has been off of Ambien.  He sleeps about 4 hours a night, most nights  -He had refused sleep psychology in the past  -PDMP reviewed, patient does not have any other prescriptions besides Ambien and pregabalin    Plan:  -Patient will have Ambien refilled for 1 month, then will do 3-month refill at next visit  -Patient had controlled substance agreement filled at today's appointment  -Patient will do urine drug screen  -We will send in sleep psychology referral (prior was referred to [facility] but could not make it)

## 2022-02-07 NOTE — PROGRESS NOTES
Established Patient    Dejan Mooney is a 71 y.o. male with a PMHx of insomnia on ambien, IDDM2, HTN, nonobstructive CAD on 2014 cath, BPPV, and GERD  who presents today for followup.    HPI:     Type 2 Diabetes:  -Uses jocelyne to monitor glucoses, A1C 7.6 9/2021  -follows with Dr. Durant who takes care of diabetes management except neuropathy  -uses levemir 25 BID, ozempic, metformin. He has had a couple of low events  -c/b neuropathy, severe and not helpful at gabapentin 800 mg TID. Pregabalin trial did for short period of time at 75 mg with some improvement but didn't keep up with it as didn't seem to cause significant improvement, would like to try again    Insomnia:  -Difficulty falling asleep and staying asleep, wakes up unrefreshed. Much worse since without ambien.  -Sleeps about 4 hours a night, most nights  -Previously, does not want to sleep psychologist,   -PDMP reviewed    Vertigo:  -dx with BPPV but has changed in quality now impacting balance with more positional changes from prior  -Going to talk with audiometrist Dr. Rosales  -uses phenergan rarely for nausea, never really uses meclizine    CKD II:   -has kidney doctor Dr. Najjar for CKD II who says he is doing well    HTN:  -high today 159/87, usually not running that high per patient, needs refills  -amlodipine, HCTZ, irbesartan to be refilled by us  -Will see Dr. Trujillo to discuss Toprol XL    Nonobstructive CAD:  -will follow with Dr. Trujillo with cardiology  -needs labs, using zetia not atorvastatin as prior concerned of statin side effects but is willing to go back.     GERD:  -Taking famotidine but seems to be uncontrolled    ROS: As per HPI. Additional pertinent symptoms as noted below.    Review of Systems   Constitutional: Negative for chills and fever.   Respiratory: Negative for cough and shortness of breath.    Cardiovascular: Negative for chest pain, palpitations and leg swelling.   Gastrointestinal: Positive for heartburn.  Negative for abdominal pain, nausea and vomiting.   Skin: Negative for itching and rash.   Neurological: Positive for sensory change. Negative for dizziness and weakness.   Psychiatric/Behavioral: Negative for depression. The patient has insomnia.        Patient Active Problem List    Diagnosis Date Noted   • Obesity with body mass index 30 or greater 02/07/2022   • Tear of medial meniscus of knee 08/24/2021   • Dyslipidemia 08/11/2021   • Obstructive sleep apnea, adult 07/26/2021   • Gastroesophageal reflux disease 03/11/2021   • Stage 2 chronic kidney disease 07/20/2020   • Proteinuria 07/20/2020   • Special screening examination for viral disease 07/07/2020   • Insomnia 04/15/2020   • Type 2 diabetes mellitus with diabetic mononeuropathy (HCC) 09/12/2017   • Coronary artery disease 06/13/2017   • Hypertension 06/13/2017   • Family history of ischemic heart disease 05/13/2014       Social History     Tobacco Use   • Smoking status: Never Smoker   • Smokeless tobacco: Never Used   Substance Use Topics   • Alcohol use: No   • Drug use: No       Current Outpatient Medications   Medication Sig Dispense Refill   • Continuous Blood Gluc Sensor (FREESTYLE HANH 14 DAY SENSOR) Misc FREESTYLE HANH 14 DAY SENSOR     • BD PEN NEEDLE MICRO U/F 32G X 6 MM Misc      • insulin detemir (LEVEMIR) 100 UNIT/ML Solution Inject 25 Units under the skin 2 times a day.     • amLODIPine (NORVASC) 10 MG Tab Take 5 mg by mouth every day. Take 1/2 tablet daily     • omeprazole (PRILOSEC) 20 MG delayed-release capsule Take 40 mg by mouth every day.     • zolpidem (AMBIEN) 10 MG Tab Take 1 Tablet by mouth at bedtime as needed for Sleep for up to 30 days. 30 Tablet 0   • pregabalin (LYRICA) 75 MG Cap Take 1 Capsule by mouth 2 times a day for 30 days. 60 Capsule 0   • atorvastatin (LIPITOR) 40 MG Tab Take 1 Tablet by mouth every evening. 90 Tablet 1   • hydroCHLOROthiazide (HYDRODIURIL) 25 MG Tab Take 1 Tablet by mouth every day. 90 Tablet 1  "  • irbesartan (AVAPRO) 150 MG Tab Take 2 Tablets by mouth every day. 180 Tablet 1   • metoprolol SR (TOPROL XL) 25 MG TB24 Take 25 mg by mouth every day.     • ASPIRIN 81 MG PO TABS Take 81 mg by mouth every day.     • metFORMIN (GLUCOPHAGE) 500 MG Tab Take 500 mg by mouth 2 times a day. Take 500 mg in the morning and 1000 mg in the pm     • Semaglutide, 1 MG/DOSE, (OZEMPIC, 1 MG/DOSE,) 2 MG/1.5ML Solution Pen-injector OZEMPIC (1 MG/DOSE) 2 MG/1.5ML SOPN (Patient not taking: Reported on 2/7/2022)     • promethazine (PHENERGAN) 25 MG Tab Take 1 Tab by mouth every 6 hours as needed for Nausea/Vomiting. (Patient not taking: Reported on 8/24/2021) 30 Tab 0     No current facility-administered medications for this visit.       /87 (BP Location: Left arm, Patient Position: Sitting, BP Cuff Size: Adult)   Pulse 79   Temp 37.6 °C (99.6 °F) (Temporal)   Ht 1.905 m (6' 3\")   Wt 113 kg (250 lb)   SpO2 97%   BMI 31.25 kg/m²     Physical Exam  Physical Exam  Constitutional:       General: He is not in acute distress.     Appearance: Normal appearance.   HENT:      Head: Normocephalic and atraumatic.      Mouth/Throat:      Mouth: Mucous membranes are moist.      Pharynx: Oropharynx is clear. No oropharyngeal exudate or posterior oropharyngeal erythema.   Cardiovascular:      Rate and Rhythm: Normal rate and regular rhythm.      Heart sounds: No murmur heard.  No gallop.    Pulmonary:      Effort: Pulmonary effort is normal. No respiratory distress.      Breath sounds: Normal breath sounds. No wheezing or rales.   Abdominal:      General: Abdomen is flat. Bowel sounds are normal. There is no distension.      Palpations: Abdomen is soft.      Tenderness: There is no abdominal tenderness.   Musculoskeletal:         General: No swelling. Normal range of motion.   Skin:     General: Skin is warm and dry.      Capillary Refill: Capillary refill takes less than 2 seconds.      Findings: No erythema or rash.   Neurological: "      General: No focal deficit present.      Mental Status: He is alert and oriented to person, place, and time.   Psychiatric:         Mood and Affect: Mood normal.         Behavior: Behavior normal.          Assessment and Plan:    Type 2 diabetes mellitus with diabetic mononeuropathy (HCC)  A1C 7.6  -levemir 25U bid, ozempic, metformin. Follows with dr wolfe.  -c/b neuropathy    Plan:  -Endocrine Dr. Wolfe following  -Pregabalin 75 mg BID trial, substance agreement signed  -Instructed to stop gabapentin when using pregabalin as he did last time'  -A1C, lipid panel    Insomnia  Has difficulty falling and staying asleep, he wakes up unrefreshed.  This is worse since he has been off of Ambien.  He sleeps about 4 hours a night, most nights  -He had refused sleep psychology in the past  -PDMP reviewed, patient does not have any other prescriptions besides Ambien and pregabalin    Plan:  -Patient will have Ambien refilled for 1 month, then will do 3-month refill at next visit  -Patient had controlled substance agreement filled at today's appointment  -Patient will do urine drug screen  -We will send in sleep psychology referral (prior was referred to [facility] but could not make it)    Stage 2 chronic kidney disease  Patient noted to have CKD II with proteinuria in the past  -follows with Dr. Najjar    Plan:  -CMP to follow    Coronary artery disease  Nonobstructive on cath 2014  -on ASA 81, not on statin on zetia due to prior concern regarding statin safety    Plan:  -Stop zetia, start atorvastatin 40 mg  -Lipid panel  -Will follow Dr. Trujillo's notes for recommendations and meds    Dyslipidemia  Patient on zetia, CAD hx and DM2    Plan:  -Lipid panel    Gastroesophageal reflux disease  Patient taking famotidine for longstanding GERD hx    Plan:  -Next visit will discuss GERD sx in detail, may benefit from dykes's screening given age and somewhat worsening sx  -Change famotidine to  omeprazole    Hypertension  Elevated 159/87 today, out of home meds (HCTZ, amlodipine, irbesartan, toprol)  -primary, CAD and DM2 goal <130/80    Plan:  -Refill home meds  -Follow at next visit for medication titration as needed for goal    Preventative health care  Need to discuss shots history, HCV assessment, colonoscopy setup, and medicare wellness visit    Follow up: 5 weeks for pregabalin titration    Signed by: Garfield Kraft D.O.

## 2022-02-07 NOTE — TELEPHONE ENCOUNTER
Spoke to patient regarding upcoming appt with CALLUM, Pt states he has not been seen by different cardiologist before other than Dr. Ovalles at [facility]. Pt stated he will be getting blood work done tomorrow ordered by different provider. No recent cardiac testing. Requested all cardiac records from [facility] Fax confirmation received and sent to [facility] for scanning.    [facility] records pending.

## 2022-02-07 NOTE — TELEPHONE ENCOUNTER
Last seen: 02/07/2022 by Dr. Kraft  Next appt: 03/16/2022 with Dr. Kraft    Was the patient seen in the last year in this department? Yes   Does patient have an active prescription for medications requested? No   Received Request Via: Pharmacy

## 2022-02-07 NOTE — ASSESSMENT & PLAN NOTE
A1C 7.6  -levemir 25U bid, ozempic, metformin. Follows with dr durant.  -c/b neuropathy    Plan:  -Endocrine Dr. Durant following  -Pregabalin 75 mg BID trial, substance agreement signed  -Instructed to stop gabapentin when using pregabalin as he did last time'  -A1C, lipid panel

## 2022-02-08 PROBLEM — Z00.00 PREVENTATIVE HEALTH CARE: Status: ACTIVE | Noted: 2022-02-08

## 2022-02-08 NOTE — ASSESSMENT & PLAN NOTE
Patient noted to have CKD II with proteinuria in the past  -follows with Dr. Najjar    Plan:  -CMP to follow

## 2022-02-08 NOTE — ASSESSMENT & PLAN NOTE
Nonobstructive on cath 2014  -on ASA 81, not on statin on zetia due to prior concern regarding statin safety    Plan:  -Stop zetia, start atorvastatin 40 mg  -Lipid panel  -Will follow Dr. Trujillo's notes for recommendations and meds

## 2022-02-08 NOTE — ASSESSMENT & PLAN NOTE
Elevated 159/87 today, out of home meds (HCTZ, amlodipine, irbesartan, toprol)  -primary, CAD and DM2 goal <130/80    Plan:  -Refill home meds  -Follow at next visit for medication titration as needed for goal

## 2022-02-08 NOTE — PATIENT INSTRUCTIONS
In one week, trial pregabalin 75 mg twice a day and stop taking gabapentin    Recommend discussing vertigo issues with audiometry, if ENT referral needed then let us know.    Refilling ambien, if issues please let me know on my chart so I can help fix. At next visit in 5 weeks will set you up with 3 months worth    At next visit will discuss GERD issues in more depth and discuss GI consultation and colonoscopy discussion

## 2022-02-08 NOTE — ASSESSMENT & PLAN NOTE
Patient taking famotidine for longstanding GERD hx    Plan:  -Next visit will discuss GERD sx in detail, may benefit from dykes's screening given age and somewhat worsening sx  -Change famotidine to omeprazole

## 2022-02-22 ENCOUNTER — OFFICE VISIT (OUTPATIENT)
Dept: CARDIOLOGY | Facility: MEDICAL CENTER | Age: 72
End: 2022-02-22
Payer: MEDICARE

## 2022-02-22 VITALS
RESPIRATION RATE: 18 BRPM | DIASTOLIC BLOOD PRESSURE: 82 MMHG | WEIGHT: 242 LBS | HEART RATE: 82 BPM | HEIGHT: 75 IN | SYSTOLIC BLOOD PRESSURE: 132 MMHG | BODY MASS INDEX: 30.09 KG/M2 | OXYGEN SATURATION: 96 %

## 2022-02-22 DIAGNOSIS — E78.5 DYSLIPIDEMIA: ICD-10-CM

## 2022-02-22 DIAGNOSIS — E11.41 TYPE 2 DIABETES MELLITUS WITH DIABETIC MONONEUROPATHY, WITH LONG-TERM CURRENT USE OF INSULIN (HCC): ICD-10-CM

## 2022-02-22 DIAGNOSIS — I71.20 THORACIC AORTIC ANEURYSM WITHOUT RUPTURE (HCC): ICD-10-CM

## 2022-02-22 DIAGNOSIS — E66.9 OBESITY WITH BODY MASS INDEX 30 OR GREATER: ICD-10-CM

## 2022-02-22 DIAGNOSIS — I10 PRIMARY HYPERTENSION: ICD-10-CM

## 2022-02-22 DIAGNOSIS — Z79.4 TYPE 2 DIABETES MELLITUS WITH DIABETIC MONONEUROPATHY, WITH LONG-TERM CURRENT USE OF INSULIN (HCC): ICD-10-CM

## 2022-02-22 DIAGNOSIS — I25.10 CORONARY ARTERY DISEASE INVOLVING NATIVE CORONARY ARTERY OF NATIVE HEART WITHOUT ANGINA PECTORIS: ICD-10-CM

## 2022-02-22 DIAGNOSIS — I45.10 RIGHT BUNDLE BRANCH BLOCK: ICD-10-CM

## 2022-02-22 PROBLEM — N18.2 STAGE 2 CHRONIC KIDNEY DISEASE: Status: RESOLVED | Noted: 2020-07-20 | Resolved: 2022-02-22

## 2022-02-22 LAB — EKG IMPRESSION: NORMAL

## 2022-02-22 PROCEDURE — 93000 ELECTROCARDIOGRAM COMPLETE: CPT | Performed by: INTERNAL MEDICINE

## 2022-02-22 PROCEDURE — 99214 OFFICE O/P EST MOD 30 MIN: CPT | Mod: 25 | Performed by: INTERNAL MEDICINE

## 2022-02-22 RX ORDER — HYDROCHLOROTHIAZIDE 25 MG/1
25 TABLET ORAL DAILY
Qty: 90 TABLET | Refills: 3 | Status: SHIPPED | OUTPATIENT
Start: 2022-02-22 | End: 2022-12-13 | Stop reason: SDUPTHER

## 2022-02-22 RX ORDER — ATORVASTATIN CALCIUM 80 MG/1
80 TABLET, FILM COATED ORAL NIGHTLY
Qty: 90 TABLET | Refills: 3 | Status: SHIPPED | OUTPATIENT
Start: 2022-02-22 | End: 2023-04-24 | Stop reason: SDUPTHER

## 2022-02-22 RX ORDER — EZETIMIBE 10 MG/1
TABLET ORAL
COMMUNITY
Start: 2022-02-09 | End: 2022-02-22

## 2022-02-22 RX ORDER — IRBESARTAN 300 MG/1
300 TABLET ORAL DAILY
Qty: 90 TABLET | Refills: 3 | Status: SHIPPED | OUTPATIENT
Start: 2022-02-22 | End: 2023-03-21

## 2022-02-22 RX ORDER — CARVEDILOL 6.25 MG/1
6.25 TABLET ORAL 2 TIMES DAILY WITH MEALS
Qty: 180 TABLET | Refills: 3 | Status: SHIPPED | OUTPATIENT
Start: 2022-02-22 | End: 2023-02-16

## 2022-02-22 ASSESSMENT — FIBROSIS 4 INDEX: FIB4 SCORE: 1.66

## 2022-02-22 ASSESSMENT — ENCOUNTER SYMPTOMS
BLURRED VISION: 1
WEAKNESS: 1
HEARTBURN: 1

## 2022-02-22 NOTE — PROGRESS NOTES
Cardiology Initial Consultation Note    Date of note:    2/22/2022    Primary Care Provider: Garfield Kraft D.O.  Referring Provider: Self    Patient Name: Dejan Mooney   YOB: 1950  MRN:              [id number]    Chief Complaint: hypertension    History of Present Illness: Dejan Mooney is a 71 y.o. male whose current medical problems include insulin dependent diabetes, hypertension, and non-obstructive disease on previous cath who is here for cardiac consultation for hypertension.    Has chronic BPPV, mild symptoms now.     Some mild leg swelling. Exercising around 15 minutes a day, asymptomatic.     BP in the 130s-140s/70s.     Last hgbA1c 7.6    Review of Systems   Constitutional: Positive for malaise/fatigue.   Eyes: Positive for blurred vision.   Cardiovascular: Positive for leg swelling.   Musculoskeletal: Positive for joint pain.   Gastrointestinal: Positive for heartburn.   Neurological: Positive for weakness.       All other systems reviewed and discussed using a comprehensive questionnaire and are negative.       Past Medical History:   Diagnosis Date   • BBB (bundle branch block)    • Diabetes     insulin and oral medication   • Heart burn    • Hyperlipidemia    • Hypertension    • Indigestion    • Pain     knee, shoulder   • Pneumonia 1974   • Psychiatric problem     depression   • Renal calculus 7/2013    kidney stones   • Snoring     sleep apnea questionairre completed         Past Surgical History:   Procedure Laterality Date   • RECOVERY  5/13/2014    Performed by Cath-Recovery Surgery at SURGERY SAME DAY [facility]   • OTHER  2/2014    right knee   • KNEE ARTHROSCOPY  8/22/2013    Performed by Maurisio Alfaro M.D. at SURGERY [facility]   • MENISCECTOMY, KNEE, MEDIAL  8/22/2013    Performed by Maurisio Alfaro M.D. at SURGERY [facility]   • SHOULDER ARTHROTOMY  2003    right   • KNEE ARTHROSCOPY  1990    left   • KNEE  ARTHROSCOPY  1985    right   • ORIF, ANKLE  1984    left   • ORIF, KNEE  1970    left   • TONSILLECTOMY  as child         Current Outpatient Medications   Medication Sig Dispense Refill   • atorvastatin (LIPITOR) 80 MG tablet Take 1 Tablet by mouth every evening. 90 Tablet 3   • hydroCHLOROthiazide (HYDRODIURIL) 25 MG Tab Take 1 Tablet by mouth every day. 90 Tablet 3   • irbesartan (AVAPRO) 300 MG Tab Take 1 Tablet by mouth every day. 90 Tablet 3   • carvedilol (COREG) 6.25 MG Tab Take 1 Tablet by mouth 2 times a day with meals. 180 Tablet 3   • Continuous Blood Gluc Sensor (FREESTYLE HANH 14 DAY SENSOR) Misc FREESTYLE HANH 14 DAY SENSOR     • BD PEN NEEDLE MICRO U/F 32G X 6 MM Misc      • metFORMIN (GLUCOPHAGE) 500 MG Tab Take 500 mg by mouth 2 times a day. Take 500 mg in the morning and 1000 mg in the pm     • insulin detemir (LEVEMIR) 100 UNIT/ML Solution Inject 25 Units under the skin 2 times a day.     • omeprazole (PRILOSEC) 20 MG delayed-release capsule Take 40 mg by mouth every day.     • zolpidem (AMBIEN) 10 MG Tab Take 1 Tablet by mouth at bedtime as needed for Sleep for up to 30 days. 30 Tablet 0   • pregabalin (LYRICA) 75 MG Cap Take 1 Capsule by mouth 2 times a day for 30 days. 60 Capsule 0   • Semaglutide, 1 MG/DOSE, (OZEMPIC, 1 MG/DOSE,) 2 MG/1.5ML Solution Pen-injector every 7 days.     • promethazine (PHENERGAN) 25 MG Tab Take 1 Tab by mouth every 6 hours as needed for Nausea/Vomiting. 30 Tab 0   • ASPIRIN 81 MG PO TABS Take 81 mg by mouth every day.       No current facility-administered medications for this visit.         Allergies   Allergen Reactions   • Penicillins Anaphylaxis, Hives, Swelling and Shortness of Breath   • Norvasc [Amlodipine]      Leg swelling         Family History   Problem Relation Age of Onset   • Heart Disease Mother    • Hypertension Mother    • Heart Disease Father    • Hypertension Father    • Atrial fibrillation Brother          Social History     Socioeconomic  "History   • Marital status:      Spouse name: Not on file   • Number of children: Not on file   • Years of education: Not on file   • Highest education level: Not on file   Occupational History   • Not on file   Tobacco Use   • Smoking status: Never Smoker   • Smokeless tobacco: Never Used   Vaping Use   • Vaping Use: Never used   Substance and Sexual Activity   • Alcohol use: Yes     Alcohol/week: 1.2 oz     Types: 2 Standard drinks or equivalent per week   • Drug use: No   • Sexual activity: Not on file   Other Topics Concern   • Not on file   Social History Narrative   • Not on file     Social Determinants of Health     Financial Resource Strain: Not on file   Food Insecurity: Not on file   Transportation Needs: Not on file   Physical Activity: Not on file   Stress: Not on file   Social Connections: Not on file   Intimate Partner Violence: Not on file   Housing Stability: Not on file         Physical Exam:  Ambulatory Vitals  /82 (BP Location: Left arm, Patient Position: Sitting, BP Cuff Size: Adult)   Pulse 82   Resp 18   Ht 1.905 m (6' 3\")   Wt 110 kg (242 lb)   SpO2 96%    Oxygen Therapy:  Pulse Oximetry: 96 %  BP Readings from Last 4 Encounters:   02/22/22 132/82   02/07/22 159/87   09/29/21 120/70   08/24/21 110/70       Weight/BMI: Body mass index is 30.25 kg/m².  Wt Readings from Last 4 Encounters:   02/22/22 110 kg (242 lb)   02/07/22 113 kg (250 lb)   09/29/21 108 kg (239 lb)   08/24/21 104 kg (229 lb)       General: No apparent distress  Eyes: nl conjunctiva  ENT: OP covered by mask.   Neck: JVP 4 cm H2O, no carotid bruits  Lungs: normal respiratory effort, CTAB  Heart: RRR, no murmurs, no rubs or gallops, trace to 1+ edema bilateral lower extremities. No LV/RV heave on cardiac palpatation. 2+ bilateral radial pulses.     Abdomen: soft, non tender, non distended, no masses, normal bowel sounds.  No HSM.  Extremities/MSK: no clubbing, no cyanosis  Neurological: No focal sensory " deficits  Psychiatric: Appropriate affect, A/O x 3, intact judgement and insight  Skin: Warm extremities      Lab Data Review:  Lab Results   Component Value Date/Time    CHOLSTRLTOT 144 06/17/2013 07:12 AM    LDL 71 06/17/2013 07:12 AM    HDL 31 (A) 06/17/2013 07:12 AM    TRIGLYCERIDE 211 (H) 06/17/2013 07:12 AM       Lab Results   Component Value Date/Time    SODIUM 139 09/22/2021 09:01 AM    SODIUM 135 07/07/2020 05:40 PM    POTASSIUM 4.3 09/22/2021 09:01 AM    POTASSIUM 4.0 07/07/2020 05:40 PM    CHLORIDE 101 09/22/2021 09:01 AM    CHLORIDE 97 07/07/2020 05:40 PM    CO2 26 09/22/2021 09:01 AM    CO2 21 07/07/2020 05:40 PM    GLUCOSE 178 (H) 09/22/2021 09:01 AM    GLUCOSE 181 (H) 07/07/2020 05:40 PM    BUN 18 09/22/2021 09:01 AM    BUN 29 (H) 07/07/2020 05:40 PM    CREATININE 0.77 09/22/2021 09:01 AM    CREATININE 1.21 07/07/2020 05:40 PM    BUNCREATRAT 23 09/22/2021 09:01 AM     Lab Results   Component Value Date/Time    ALKPHOSPHAT 76 07/07/2020 05:40 PM    ASTSGOT 15 07/07/2020 05:40 PM    ALTSGPT 17 07/07/2020 05:40 PM    TBILIRUBIN 1.0 07/07/2020 05:40 PM      Lab Results   Component Value Date/Time    WBC 5.0 09/22/2021 09:01 AM    WBC 13.1 (H) 07/07/2020 05:40 PM     No components found for: HBGA1C  No components found for: TROPONIN  No results found for: BNP      Cardiac Imaging and Procedures Review:    EKG dated 2/22/2022 : My personal interpretation is NSR, 1st degree AV block, RBBB, inferior infarct.     Echo dated 5/2013:   CONCLUSIONS   Dilated aortic root at 4.2 cm.   Normal left ventricular systolic function.   Mild concentric left ventricular hypertrophy.   Left ventricular ejection fraction is 55% to 60%.   Grade I diastolic dysfunction is present.   Right ventricular free wall is not entirely visualized; however the   proximal segment in the subcostal view contracts well; the apex in the   parasternal view appears hypokinetic.   Right ventricular systolic pressure is estimated to be 25-30mmHg.      [facility] (2014):   FINAL IMPRESSION:  1.  Mild to moderate disease of the moderate disease of the proximal left   anterior descending, about 40%, right near its ostium with a small amount of   post stenotic dilatation.  2.  Near normal to normal left ventricular function at this time.  3.  At this time, we will continue aggressive medical therapy.    Radiology test Review:  CXR: 2020  FINDINGS:    No pulmonary infiltrates or consolidations are noted.  No pleural effusion. No pneumothorax.     Normal cardiopericardial silhouette.     Medical Decision Makin. Primary hypertension  Inadequately controlled  -stop norvasc due to leg swelling  -switch metoprolol to coreg 6.25mg PO bid to have better efficacy with hypertension  -continue irbesartan 300mg PO Daily  -continue hctz 25mg PO Daily  -could consider spironolactone as next agent if symptoms from BB.     2. Coronary artery disease involving native coronary artery of native heart without angina pectoris  Non-obstructive disease on previous cath, but this was in the proximal LAD and he has poorly controlled diabetes and hypertension  -continue aspirin 81mg PO daily for primary prevention.   -increase lipitor to 80mg PO daily, goal LDL <55.     3. Dyslipidemia  Increase lipitor, may need to add zetia to reach goal LDL <55    4. Obesity with body mass index 30 or greater  Discussed transition to more vegan diet and increase exercise to 30 minutes 5 times a week.     5. Type 2 diabetes mellitus with diabetic mononeuropathy, with long-term current use of insulin (HCC)  Per PCP, aggressive control    6. Right bundle branch block  Noted, discussed pacemaker and ER precautions.     7. Aortic aneurysm - mild on echo 9 years ago  -recheck echo.         Return in about 1 year (around 2023).      Walter Trujillo MD, [facility]  [facility], [address]  [address], [address] [address]  Phone:  [phone]  Fax: [phone]

## 2022-02-22 NOTE — PATIENT INSTRUCTIONS
Please increase lipitor to 80mg once a day.     Please stop norvasc (also called amlodipine) and metoprolol.     Please start carvedilol (also called coreg) 6.25mg twice a day.     Please schedule your echocardiogram.

## 2022-02-24 ENCOUNTER — HOSPITAL ENCOUNTER (OUTPATIENT)
Dept: CARDIOLOGY | Facility: MEDICAL CENTER | Age: 72
End: 2022-02-24
Attending: INTERNAL MEDICINE
Payer: MEDICARE

## 2022-02-24 DIAGNOSIS — I45.10 RIGHT BUNDLE BRANCH BLOCK: ICD-10-CM

## 2022-02-24 DIAGNOSIS — I25.10 CORONARY ARTERY DISEASE INVOLVING NATIVE CORONARY ARTERY OF NATIVE HEART WITHOUT ANGINA PECTORIS: ICD-10-CM

## 2022-02-24 PROCEDURE — 93306 TTE W/DOPPLER COMPLETE: CPT

## 2022-02-25 LAB
LV EJECT FRACT  99904: 70
LV EJECT FRACT MOD 2C 99903: 68.39
LV EJECT FRACT MOD 4C 99902: 67.59
LV EJECT FRACT MOD BP 99901: 67.27

## 2022-02-25 PROCEDURE — 93306 TTE W/DOPPLER COMPLETE: CPT | Mod: 26 | Performed by: INTERNAL MEDICINE

## 2022-03-16 ENCOUNTER — OFFICE VISIT (OUTPATIENT)
Dept: INTERNAL MEDICINE | Facility: OTHER | Age: 72
End: 2022-03-16
Payer: MEDICARE

## 2022-03-16 ENCOUNTER — TELEPHONE (OUTPATIENT)
Dept: INTERNAL MEDICINE | Facility: OTHER | Age: 72
End: 2022-03-16

## 2022-03-16 VITALS
SYSTOLIC BLOOD PRESSURE: 122 MMHG | HEART RATE: 73 BPM | WEIGHT: 246.7 LBS | BODY MASS INDEX: 30.67 KG/M2 | HEIGHT: 75 IN | TEMPERATURE: 97.6 F | OXYGEN SATURATION: 99 % | DIASTOLIC BLOOD PRESSURE: 72 MMHG

## 2022-03-16 DIAGNOSIS — F51.01 PRIMARY INSOMNIA: ICD-10-CM

## 2022-03-16 DIAGNOSIS — Z79.4 TYPE 2 DIABETES MELLITUS WITH DIABETIC MONONEUROPATHY, WITH LONG-TERM CURRENT USE OF INSULIN (HCC): ICD-10-CM

## 2022-03-16 DIAGNOSIS — E11.41 TYPE 2 DIABETES MELLITUS WITH DIABETIC MONONEUROPATHY, WITH LONG-TERM CURRENT USE OF INSULIN (HCC): ICD-10-CM

## 2022-03-16 DIAGNOSIS — Z00.00 PREVENTATIVE HEALTH CARE: ICD-10-CM

## 2022-03-16 DIAGNOSIS — I10 PRIMARY HYPERTENSION: ICD-10-CM

## 2022-03-16 DIAGNOSIS — E66.9 OBESITY WITH BODY MASS INDEX 30 OR GREATER: ICD-10-CM

## 2022-03-16 DIAGNOSIS — K21.9 GASTROESOPHAGEAL REFLUX DISEASE WITHOUT ESOPHAGITIS: ICD-10-CM

## 2022-03-16 DIAGNOSIS — Z11.59 ENCOUNTER FOR SCREENING FOR VIRAL DISEASE: ICD-10-CM

## 2022-03-16 DIAGNOSIS — R21 SKIN RASH: ICD-10-CM

## 2022-03-16 DIAGNOSIS — E78.5 DYSLIPIDEMIA: ICD-10-CM

## 2022-03-16 PROCEDURE — 99213 OFFICE O/P EST LOW 20 MIN: CPT | Mod: GE | Performed by: STUDENT IN AN ORGANIZED HEALTH CARE EDUCATION/TRAINING PROGRAM

## 2022-03-16 RX ORDER — ZOLPIDEM TARTRATE 10 MG/1
10 TABLET ORAL NIGHTLY PRN
Qty: 30 TABLET | Refills: 0 | Status: SHIPPED | OUTPATIENT
Start: 2022-03-16 | End: 2022-04-15

## 2022-03-16 RX ORDER — ZOLPIDEM TARTRATE 10 MG/1
10 TABLET ORAL NIGHTLY PRN
Qty: 30 TABLET | Refills: 0 | Status: SHIPPED | OUTPATIENT
Start: 2022-05-16 | End: 2022-06-15

## 2022-03-16 RX ORDER — ZOLPIDEM TARTRATE 10 MG/1
10 TABLET ORAL NIGHTLY PRN
Qty: 30 TABLET | Refills: 0 | Status: SHIPPED | OUTPATIENT
Start: 2022-04-16 | End: 2022-05-16

## 2022-03-16 ASSESSMENT — FIBROSIS 4 INDEX: FIB4 SCORE: 1.66

## 2022-03-16 ASSESSMENT — PATIENT HEALTH QUESTIONNAIRE - PHQ9: CLINICAL INTERPRETATION OF PHQ2 SCORE: 0

## 2022-03-16 NOTE — ASSESSMENT & PLAN NOTE
Patient has history of dyslipidemia with nonobstructive coronary artery disease  -Takes atorvastatin 80 mg  -No lipid panel follow see how patient is doing for some time    Plan:  -Repeat lipid panel and make sure LDL is within our target range for ASCVD scoring

## 2022-03-16 NOTE — PATIENT INSTRUCTIONS
1. Will prescribe miconazole cream to use as needed for 1-2 weeks when rash comes on. Take a picture, if persistent call to come in so we can look at it and give diflucan if necessary and have better understanding of what the rash represents    2. Will prescribe 3 months of ambien    3. Will send in sleep psychology referral    4. Recommend tdap and shingle shot series    5. Please get labs done as soon as possible, will call with results

## 2022-03-16 NOTE — ASSESSMENT & PLAN NOTE
Patient is trial himself off omeprazole, is not having any reflux at this time  -Continue to monitor symptoms, if no symptoms athen can remove reflux from active problem list

## 2022-03-16 NOTE — ASSESSMENT & PLAN NOTE
Has difficulty falling and staying asleep, he wakes up unrefreshed.  This is worse since he has been off of Ambien.  He sleeps about 4 hours a night, most nights  -He had refused sleep psychology in the past  -PDMP reviewed, patient does not have any other prescriptions besides Ambien and pregabalin, UDS negative for other substances. Controlled substance agreement signed 2/2022    Plan:  -Ambien refills placed at 10 mg for 3 month supply with recommendation to use as little as possible and space out as much as possible  -Sleep psychology referral pending

## 2022-03-16 NOTE — PROGRESS NOTES
Established Patient    Dejan Mooney is a 71 y.o. male with a PMHx of type 2 DM A1C 7.8,  who presents today for general followup.    HPI:     Skin Rash:  -Patient has every 2 months yeast appearance on right leg with pruritis, erythematous raised rash and abnormal smell, notices when sugars are less controlled. Takes diflucan from an old prescription and it goes away 2-3 days, does not seem to work with OTC   -miconazole cream    Insomnia:  -Sleep is now excellent with the ambien, doesn't even usually use the whole pill, uses 1/2 to 1 pill. Uses about every 1-3 days.  -PDMP and appears to be incorrect, as [facility] the [facility]  -sleep psychology pending    Type 2 Diabetes Mellitus:  -uses jocelyne to follow glucoses, had a few hypoglycemia events in 50s in the morning however Dr. Durant, saw at new office and brought down to 25 BID due to these events. Believes he saw him 3 months ago and A1C in office was 7.8 (our last records said 7.6 9/2021) and is pending new appt  -follows with eye doctor, and gets monofilament with endocrine  -Had discussion with patient after last visit, would stop pregabalin and continue gabapentin at 800 mg TID    CAD/HTN:  -Saw Dr. Trujillo with cardiology, he removed metoprolol and amlodipine and switched to carvedilol 6.25 mg BID  -Continues to take Irbesartan 300 mg tab, hydrochlorothiazide 25 mg tab, ASA 81 and atorvastatin 80 mg    GERD:  -stopped taking omeprazole, not having reflux and feeling well    Balance Issues:  -Patient states he is not really getting positional dizziness, dizziness or passing out feeling, but is noticing difficulty with foot placement. He does have neuropathy with burning pain that is fairly significant.  -Back to using gabapentin without good control of pain    Primary Care:  -Dr. Prasad is usual GI doc, had colonoscopy with Dr. Soto <6 months ago  -No hx of HCV testing  -no shingles,  tdap    Rt leg swelling > left leg    ROS: As per HPI. Additional pertinent symptoms as noted below.    Review of Systems   Constitutional: Negative for chills, fever and weight loss.   Respiratory: Negative for cough and shortness of breath.    Cardiovascular: Positive for leg swelling. Negative for chest pain.   Neurological: Positive for sensory change. Negative for dizziness and weakness.       Patient Active Problem List    Diagnosis Date Noted   • Preventative health care 02/08/2022   • Obesity with body mass index 30 or greater 02/07/2022   • Tear of medial meniscus of knee 08/24/2021   • Dyslipidemia 08/11/2021   • Obstructive sleep apnea, adult 07/26/2021   • Gastroesophageal reflux disease 03/11/2021   • Proteinuria 07/20/2020   • Insomnia 04/15/2020   • Type 2 diabetes mellitus with diabetic mononeuropathy (HCC) 09/12/2017   • Coronary artery disease 06/13/2017   • Primary hypertension 06/13/2017       Social History     Tobacco Use   • Smoking status: Never Smoker   • Smokeless tobacco: Never Used   Vaping Use   • Vaping Use: Never used   Substance Use Topics   • Alcohol use: Yes     Alcohol/week: 1.2 oz     Types: 2 Standard drinks or equivalent per week   • Drug use: No       Current Outpatient Medications   Medication Sig Dispense Refill   • atorvastatin (LIPITOR) 80 MG tablet Take 1 Tablet by mouth every evening. 90 Tablet 3   • hydroCHLOROthiazide (HYDRODIURIL) 25 MG Tab Take 1 Tablet by mouth every day. 90 Tablet 3   • irbesartan (AVAPRO) 300 MG Tab Take 1 Tablet by mouth every day. 90 Tablet 3   • carvedilol (COREG) 6.25 MG Tab Take 1 Tablet by mouth 2 times a day with meals. 180 Tablet 3   • Continuous Blood Gluc Sensor (FREESTYLE HANH 14 DAY SENSOR) [facility] SENS[facility]     • BD PEN NEEDLE MICRO U/F 32G X 6 MM Misc      • metFORMIN (GLUCOPHAGE) 500 MG Tab Take 500 mg by mouth 2 times a day. Take 500 mg in the morning and 1000 mg in the pm     • insulin detemir (LEVEMIR) 100  "UNIT/ML Solution Inject 25 Units under the skin 2 times a day.     • Semaglutide, 1 MG/DOSE, (OZEMPIC, 1 MG/DOSE,) 2 MG/1.5ML Solution Pen-injector every 7 days.     • promethazine (PHENERGAN) 25 MG Tab Take 1 Tab by mouth every 6 hours as needed for Nausea/Vomiting. 30 Tab 0   • ASPIRIN 81 MG PO TABS Take 81 mg by mouth every day.     • omeprazole (PRILOSEC) 20 MG delayed-release capsule Take 40 mg by mouth every day.       No current facility-administered medications for this visit.       /72 (BP Location: Left arm, Patient Position: Sitting, BP Cuff Size: Large adult long)   Pulse 73   Temp 36.4 °C (97.6 °F) (Temporal)   Ht 1.905 m (6' 3\")   Wt 112 kg (246 lb 11.2 oz)   SpO2 99%   BMI 30.84 kg/m²     Physical Exam  Physical Exam  Constitutional:       General: He is not in acute distress.     Appearance: Normal appearance.      Comments: overweight   HENT:      Head: Normocephalic and atraumatic.      Mouth/Throat:      Mouth: Mucous membranes are moist.      Pharynx: Oropharynx is clear. No oropharyngeal exudate or posterior oropharyngeal erythema.   Cardiovascular:      Rate and Rhythm: Normal rate and regular rhythm.      Heart sounds: No murmur heard.    No gallop.   Pulmonary:      Effort: Pulmonary effort is normal. No respiratory distress.      Breath sounds: Normal breath sounds. No wheezing or rales.   Abdominal:      General: Abdomen is flat. Bowel sounds are normal. There is no distension.      Palpations: Abdomen is soft.      Tenderness: There is no abdominal tenderness.   Musculoskeletal:         General: Swelling (1+ edema bilaterally in legs, worse on right than left) present. Normal range of motion.   Skin:     General: Skin is warm and dry.      Capillary Refill: Capillary refill takes less than 2 seconds.      Findings: No erythema or rash.   Neurological:      General: No focal deficit present.      Mental Status: He is alert and oriented to person, place, and time.   Psychiatric:  "        Mood and Affect: Mood normal.         Behavior: Behavior normal.          Assessment and Plan    Dyslipidemia  Patient has history of dyslipidemia with nonobstructive coronary artery disease  -Takes atorvastatin 80 mg  -No lipid panel follow see how patient is doing for some time    Plan:  -Repeat lipid panel and make sure LDL is within our target range for ASCVD scoring    Gastroesophageal reflux disease  Patient is trial himself off omeprazole, is not having any reflux at this time  -Continue to monitor symptoms, if no symptoms athen can remove reflux from active problem list    Primary hypertension  Patient was previously on metoprolol and amlodipine, along with his irbesartan 300 mg and hydrochlorothiazide 25 mg daily with good control  -However noticed increased swelling, saw Dr. Trujillo cardiology who discontinued metoprolol and amlodipine with replacement with Coreg 6.25 mg twice daily  -Patient has actually noticed very good control on this generally less than 120/80 this appears to been a good change to consolidate medications    Plan:  -Continue with coreg, hydrochlorothiazide, and ibresartan, bring BP log to next visit    Primary insomnia  Has difficulty falling and staying asleep, he wakes up unrefreshed.  This is worse since he has been off of Ambien.  He sleeps about 4 hours a night, most nights  -He had refused sleep psychology in the past  -PDMP reviewed, patient does not have any other prescriptions besides Ambien and pregabalin, UDS negative for other substances. Controlled substance agreement signed 2/2022    Plan:  -Ambien refills placed at 10 mg for 3 month supply with recommendation to use as little as possible and space out as much as possible  -Sleep psychology referral pending    Type 2 diabetes mellitus with diabetic mononeuropathy (HCC)  A1C 7.6  -levemir 25U bid, ozempic 1 mg, metformin 1000 BID. Follows with dr wolfe, uses freestyle jocelyne  -c/b neuropathy on gabapentin 800 mg TID,  concern for cause of patients balance issues as no dizziness/disequilibrium and notes issues with foot placement    Plan:  -Pending labs (cancelled a few duplicates mistakenly ordered this visit that were ordered last visit)  -Follow up with Dr. Durant, will need record request when he sees with new clinic  -Neuropathy pain seems significant, may benefit from future trial of duloxetine  -If continued balance issues, may need therapy assessment    Obesity with body mass index 30 or greater  Patient BMI ~31  -hx of NICOLA, diabetes, HTN    Plan:  -Pending lab followup  -Diet and exercise    Preventative health care  Patient had colonoscopy <6 mo ago with Dr. Soto (will need records, unable to track down in Epic)    Hepatitis C added on to labs    Recommended shingles and TDAP shots    Encounter for screening for viral disease  HCV testing ordered for one time assessment    Skin rash  Patient states that he is having abnormal raised erythematous rash over right leg that is intermittent and seems to go away when he has home Diflucan after 2 to 3 days  -Patient is concerned this is fungus, no visual proof given today    Plan:  -Recommend miconazole cream first to use as needed, and to keep skin clean and dry  -encourage patient to take pictures and if persistent to come in so that we can take pictures and assess and give proper treatment    Follow up: 10 weeks or longer as needed    Signed by: Garfield Kraft D.O.

## 2022-03-16 NOTE — LETTER
[facility]  Garfield Kraft D.O.  [address] [place] [address]  Fax: [phone]   Authorization for Release/Disclosure of   Protected Health Information   Name: DEJAN THURSTON : 1950 SSN: [id number]   Address: [facility] [address]  [place] [address] Phone:    [phone] (home) [phone] (work)   I authorize the entity listed below to release/disclose the PHI below to:   [facility]/Garfield Kraft D.O. and Garfield Kraft D.O.   Provider or Entity Name:     Address   City, State, Zip   Phone:      Fax:     Reason for request: continuity of care   Information to be released:    [  ] LAST COLONOSCOPY,  including any PATH REPORT and follow-up  [  ] LAST FIT/COLOGUARD RESULT [  ] LAST DEXA  [  ] LAST MAMMOGRAM  [  ] LAST PAP  [  ] LAST LABS [  ] RETINA EXAM REPORT  [  ] IMMUNIZATION RECORDS  [  ] Release all info      [  ] Check here and initial the line next to each item to release ALL health information INCLUDING  _____ Care and treatment for drug and / or alcohol abuse  _____ HIV testing, infection status, or AIDS  _____ Genetic Testing    DATES OF SERVICE OR TIME PERIOD TO BE DISCLOSED: _____________  I understand and acknowledge that:  * This Authorization may be revoked at any time by you in writing, except if your health information has already been used or disclosed.  * Your health information that will be used or disclosed as a result of you signing this authorization could be re-disclosed by the recipient. If this occurs, your re-disclosed health information may no longer be protected by State or Federal laws.  * You may refuse to sign this Authorization. Your refusal will not affect your ability to obtain treatment.  * This Authorization becomes effective upon signing and will  on (date) __________.      If no date is indicated, this Authorization will  one (1) year from the signature date.    Name: Dejan Thurston    Signature:   Date:      3/16/2022       PLEASE FAX REQUESTED RECORDS BACK TO: [phone]

## 2022-03-16 NOTE — ASSESSMENT & PLAN NOTE
Patient was previously on metoprolol and amlodipine, along with his irbesartan 300 mg and hydrochlorothiazide 25 mg daily with good control  -However noticed increased swelling, saw Dr. Trujillo cardiology who discontinued metoprolol and amlodipine with replacement with Coreg 6.25 mg twice daily  -Patient has actually noticed very good control on this generally less than 120/80 this appears to been a good change to consolidate medications    Plan:  -Continue with coreg, hydrochlorothiazide, and ibresartan, bring BP log to next visit

## 2022-03-17 PROBLEM — Z11.59 ENCOUNTER FOR SCREENING FOR VIRAL DISEASE: Status: ACTIVE | Noted: 2022-03-17

## 2022-03-17 RX ORDER — GABAPENTIN 400 MG/1
800 CAPSULE ORAL 3 TIMES DAILY
COMMUNITY
End: 2022-08-12

## 2022-03-17 ASSESSMENT — ENCOUNTER SYMPTOMS
WEIGHT LOSS: 0
DIZZINESS: 0
FEVER: 0
COUGH: 0
SHORTNESS OF BREATH: 0
CHILLS: 0
WEAKNESS: 0
SENSORY CHANGE: 1

## 2022-03-17 NOTE — ASSESSMENT & PLAN NOTE
Patient states that he is having abnormal raised erythematous rash over right leg that is intermittent and seems to go away when he has home Diflucan after 2 to 3 days  -Patient is concerned this is fungus, no visual proof given today    Plan:  -Recommend miconazole cream first to use as needed, and to keep skin clean and dry  -encourage patient to take pictures and if persistent to come in so that we can take pictures and assess and give proper treatment

## 2022-03-17 NOTE — ASSESSMENT & PLAN NOTE
A1C 7.6  -levemir 25U bid, ozempic 1 mg, metformin 1000 BID. Follows with dr durant, uses freestyle jocelyne  -c/b neuropathy on gabapentin 800 mg TID, concern for cause of patients balance issues as no dizziness/disequilibrium and notes issues with foot placement    Plan:  -Pending labs (cancelled a few duplicates mistakenly ordered this visit that were ordered last visit)  -Follow up with Dr. Durant, will need record request when he sees with new clinic  -Neuropathy pain seems significant, may benefit from future trial of duloxetine  -If continued balance issues, may need therapy assessment

## 2022-03-17 NOTE — ASSESSMENT & PLAN NOTE
Patient had colonoscopy <6 mo ago with Dr. Soto (will need records, unable to track down in Epic)    Hepatitis C added on to labs    Recommended shingles and TDAP shots

## 2022-06-09 NOTE — ED NOTES
2030 ERP aware of pt with brisk nose bleed Pos clots Down post pharynx  2040 Dr Alejandre to bedside RT nares packed Well keith Min ooz post POC reviewed Pt aware here for 1 hr of observation  2101 NAD Medicated for nausea   [facility]: Established patient - follow up     Chief Complaint   Patient presents with    Follow-up     Dx: MM on therapy     History of Present Illness:  Mr. Summer Park is a 66 y.o. male who presents today for follow up regarding MM. He was originally admitted with intractable back pain that has been worsening recently. Saniya Corley PTA he was seen for telemed and started on abx for UTI with some improvement in  his pain.  Workup in ED with Cr 3.3, Ca 10.7 and proteinuria.  CT AP with compression fxr of superior endplate of R08 and associated 1.6cm lytic defect in T11 vertebral body, a 1.1cm lytic lesion in the right posterior iliac bone.  Non-smoker.  Cbc  with mild anemia and normal Hb of 14.7 two years ago.  SPEP pending.  Pt is a lifelong non-smoker.  Mother  of lung ca (smoker).  We are consulted re above findings and concern for MM.  CT chest showed a soft tissue mass involving the manubrium. Skeletal survey showed multiple lytic lesions. MRI T-spine with slight compression fracture at T11, no cord compression. He was seen by Neurosurgery and no acute intervention was recommended. His sCr continued to climb.  FLC significantly  elevated. Nephrology consulted and he was transferred to [facility] on 10/17. On 10/18 he underwent temporary HD line placement, manubrium core biopsy and BM biopsy.   ycle 1 of CyBorD was started on 10/19.  He decided to be DNR on 10/20.  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  HD cath converted to perm cath on  10/25.  career.      He returns today for C7D15 Frida/Velcade/Revlimid/Dex. He is feeling okay overall - he feels like he got a boost of energy from the Vitamin B12 injection. He will start his revlimid D15-28 today. His appetite is good. No mucositis. Back pain had mostly resolved - started back mild yesterday.  Would like to hold off on PET/CT for lumbar/pelvic for lower back pain and stool incontinence    1/14/22 MRI L spine - T11 compression fracture    1/14/22 MRI Pelvis - Scattered enhancing lytic lesions throughout the pelvis and lumbar spine   compatible with active multiple myeloma lesions. The 2 dominant lesions in the   left sacral ala and right iliac wing remain unchanged in size from October 2021. The smaller subcentimeter lesions are difficult to compare due to their size. 1/27/22 FU C3D15 CyBorD + Frida. Doing well. Wishes to hold off on Kypho for now d/t having no pain. Changing to VRD after completion of this cycle. Cr 1.70.         2/10/22 switching to VRD (renal dose adjusted shona and bortez down to 1.3 to optimize duration of tolerability). 2/24/22 here for FU - on VRD now. Doing well overall. Cr 1.60. Uric acid 6.4 - RX Allopurinol 50 mg daily (discussed dosing with pharmD)   3/10/22 FU - hold tx today - URI - testing for COVID; IVF for rise UA and also hypotension. 3/22/22:        3/24/22 FU - respiratory panel previously negative. Feeling better. Resume Revlimid and Allopurinol today. Uric acid 7.5 - unable to receive Rasburicase. Cr 1.60.        4/7/22 FU p/w tx, revlimid 10 mg D15-28.    4/21/22 FU C6D1 Frida/Rev/Velcade/Dex, only took 1 dose of Rev since last seen. 5/5/22 FU P0Y17 frida/rev/velcade/Dex - only took 3 doses of Rev in the interim; SE reviewed and recs   5/26/22 FU C7D1 frida/rev/velcade/Dex - completed 14 days of rev last cycle (D15-28). MRI results reviewed. denosumab today. 6/9/22 Follow up on C7D15 - starting Revlimid today (D15-28). Otherwise, doing well.          Family History   Problem Relation Age of Onset    Lung Disease Father     Lung Disease Mother     Cancer Mother         lung      Social History     Socioeconomic History    Marital status: Single     Spouse name: None    Number of children: None    Years of education: None    Highest education level: None   Occupational History    None   Tobacco Use    Smoking status: Never Smoker    Smokeless tobacco: Never Used   Substance and Sexual Activity    Alcohol use: Yes    Drug use: None    Sexual activity: None   Other Topics Concern    None   Social History Narrative    None     Social Determinants of Health     Financial Resource Strain:     Difficulty of Paying Living Expenses: Not on file   Food Insecurity:     Worried About Running Out of Food in the Last Year: Not on file    Thierry of Food in the Last Year: Not on file   Transportation Needs:     Lack of Transportation (Medical): Not on file    Lack of Transportation (Non-Medical): Not on file   Physical Activity:     Days of Exercise per Week: Not on file    Minutes of Exercise per Session: Not on file   Stress:     Feeling of Stress : Not on file   Social Connections:     Frequency of Communication with Friends and Family: Not on file    Frequency of Social Gatherings with Friends and Family: Not on file    Attends Sikhism Services: Not on file    Active Member of [address] [place] [address] or Organizations: Not on file    Attends Club or Organization Meetings: Not on file    Marital Status: Not on file   Intimate Partner Violence:     Fear of Current or Ex-Partner: Not on file    Emotionally Abused: Not on file    Physically Abused: Not on file    Sexually Abused: Not on file   Housing Stability:     Unable to Pay for Housing in the Last Year: Not on file    Number of [place] in the Last Year: Not on file    Unstable Housing in the Last Year: Not on file        Review of Systems   Constitutional: Negative for appetite change, diaphoresis, fatigue, fever and unexpected weight change. HENT:   Positive for hearing loss (hard of hearing ). Negative for sore throat. Eyes: Negative for eye problems and icterus. Respiratory: Negative for cough, hemoptysis and shortness of breath. Cardiovascular: Negative for chest pain, leg swelling and palpitations. Gastrointestinal: Negative for abdominal distention, abdominal pain, blood in stool, diarrhea, nausea and vomiting. Endocrine: Negative for hot flashes. Genitourinary: Negative for dysuria. Musculoskeletal: Positive for arthralgias and back pain (lower/lumbar ). Negative for gait problem. Skin: Negative for itching, rash and wound. Neurological: Negative for dizziness, extremity weakness, gait problem, headaches, light-headedness, numbness, seizures and speech difficulty. Hematological: Negative for adenopathy. Does not bruise/bleed easily. Psychiatric/Behavioral: Negative for decreased concentration, depression and sleep disturbance. The patient is not nervous/anxious.          Allergies   Allergen Reactions    Rasburicase Other (See Comments)     Chest tightness, flushing, anxiety      Past Medical History:   Diagnosis Date    Cancer [facility])     Multiple Myeloma     Past Surgical History:   Procedure Laterality Date    IR BIOPSY PERCUTANEOUS DEEP BONE  10/18/2021    IR BIOPSY PERCUTANEOUS DEEP BONE  10/18/2021    IR BIOPSY PERCUTANEOUS DEEP BONE 10/18/2021 [facility] SPECI[facility]    IR NONTUNNELED VASCULAR CATHETER  10/18/2021    IR NONTUNNELED VASCULAR CATHETER  10/18/2021    IR NONTUNNELED VASCULAR CATHETER 10/18/2021 [facility]OLOGY SPECIALS    IR PORT PLACEMENT EQUAL OR GREATER THAN 5 YEARS  11/30/2021    IR PORT PLACEMENT EQUAL OR GREATER THAN 5 YEARS  11/30/2021    IR PORT PLACEMENT EQUAL OR GREATER THAN 5 YEARS 11/30/2021 [facility]OLOG[facility] SPECIALS    IR REMOVE TUNNELED VAD W PORT  1/21/2022    IR TUNNELED CATHETER PLACEMENT GREATER THAN 5 YEARS  10/25/2021    IR TUNNELED CATHETER PLACEMENT GREATER THAN 5 YEARS  10/25/2021    IR TUNNELED CATHETER PLACEMENT GREATER THAN 5 YEARS 10/25/2021 [facility]OLOGY SPECIALS    TONSILLECTOMY      VASCULAR SURGERY      WISDOM TOOTH EXTRACTION       Current Outpatient Medications   Medication Sig Dispense Refill    calcium acetate (PHOSLO) 667 MG CAPS capsule       fluconazole (DIFLUCAN) 200 MG tablet       sulfamethoxazole-trimethoprim (BACTRIM DS;SEPTRA DS) 800-160 MG per tablet       allopurinol (ZYLOPRIM) 100 MG tablet Take 100 mg by mouth daily      amLODIPine (NORVASC) 5 MG tablet Take 5 mg by mouth daily      lenalidomide (REVLIMID) 10 MG chemo capsule TAKE 1 CAPSULE DAILY      potassium chloride (KLOR-CON M) 20 MEQ extended release tablet Take 20 mEq by mouth daily      senna-docusate (PERICOLACE) 8.6-50 MG per tablet Take 1 tablet by mouth daily (Patient not taking: Reported on 5/26/2022)       No current facility-administered medications for this visit. Facility-Administered Medications Ordered in Other Visits   Medication Dose Route Frequency Provider Last Rate Last Admin    sodium chloride flush 0.9 % injection 10 mL  10 mL IntraVENous PRN Sheryl Nelson MD   10 mL at 06/09/22 0837    0.9 % sodium chloride infusion  5-250 mL/hr IntraVENous PRN Kirstie Alyssa, APRN - CNP        acetaminophen (TYLENOL) tablet 650 mg  650 mg Oral Once Kirstie Alyssa, APRN - CNP        diphenhydrAMINE (BENADRYL) capsule 25 mg  25 mg Oral Once Kirstie Alyssa, APRN - CNP        famotidine (PEPCID) tablet 20 mg  20 mg Oral Once Kirstie Alyssa, APRN - CNP        dexamethasone (DECADRON) 40 mg in sodium chloride 0.9 % IVPB  40 mg IntraVENous Once Kirstie Alyssa, APRN - CNP        bortezomib (VELCADE) 2.5 mg in sodium chloride (PF) 1 mL chemo subcutaneous syringe  1.3 mg/m2 (Order-Specific) SubCUTAneous Once Kirstie Alyssa, APRN - CNP        daratumumab-hyaluronidase-[facility],THE FASPRO) chemo syringe 1,800 mg  1,800 mg SubCUTAneous Once Kirstie Alyssa, APRN - CNP        sodium chloride flush 0.9 % injection 5-40 mL  5-40 mL IntraVENous PRN Kirstie Alyssa, APRN - CNP           No flowsheet data found.     OBJECTIVE:  /85 (Site: Right Upper Arm, Position: Sitting, Cuff Size: Large Adult)   Pulse 94   Temp 97.6 °F (36.4 °C) (Oral)   Resp 19   Ht 5' 4.5\" (1.638 m)   Wt 178 lb 4.8 oz (80.9 kg)   SpO2 97%   BMI 30.13 kg/m²       ECOG PERFORMANCE STATUS - 1- Restricted in physically strenuous activity but ambulatory and able to carry out work of a light or sedentary nature such as light house work, office work. Pain - /10. None/Minimal pain - not affecting QOL  pain w getting out of bed, quickly resolves - pt declined need for medication as the sensation is fleeting/positional     Fatigue - No flowsheet data found. Distress - No flowsheet data found. Physical Exam  Vitals reviewed. Exam conducted with a chaperone present. Constitutional:       General: He is not in acute distress. Appearance: Normal appearance. He is not ill-appearing or toxic-appearing. HENT:      Head: Normocephalic and atraumatic. Nose: Nose normal. No congestion. Mouth/Throat:      Mouth: Mucous membranes are moist.      Pharynx: Oropharynx is clear. No oropharyngeal exudate. Eyes:      General: No scleral icterus. Extraocular Movements: Extraocular movements intact. Conjunctiva/sclera: Conjunctivae normal.      Pupils: Pupils are equal, round, and reactive to light. Cardiovascular:      Rate and Rhythm: Normal rate and regular rhythm. Heart sounds: No murmur heard. Pulmonary:      Effort: Pulmonary effort is normal. No respiratory distress. Breath sounds: Normal breath sounds. No wheezing, rhonchi or rales. Chest:   Breasts:      Right: No axillary adenopathy or supraclavicular adenopathy. Left: No axillary adenopathy or supraclavicular adenopathy. Abdominal:      General: There is no distension. Palpations: Abdomen is soft. Tenderness: There is no abdominal tenderness. There is no guarding. Musculoskeletal:      Cervical back: Normal range of motion. No rigidity. Right lower leg: No edema. Left lower leg: No edema. Lymphadenopathy:      Cervical: No cervical adenopathy. Upper Body:      Right upper body: No supraclavicular or axillary adenopathy. Left upper body: No supraclavicular or axillary adenopathy. Skin:     General: Skin is warm and dry. Coloration: Skin is not jaundiced or pale. Findings: No bruising or rash. Neurological:      General: No focal deficit present. Mental Status: He is alert and oriented to person, place, and time. Motor: No weakness. Coordination: Coordination normal.      Gait: Gait normal.   Psychiatric:         Behavior: Behavior normal.         Thought Content:  Thought content normal.          Labs:  Recent Results (from the past 168 hour(s))   CBC with Auto Differential    Collection Time: 06/02/22 10:09 AM   Result Value Ref Range    WBC 4.8 4.3 - 11.1 K/uL    RBC 3.97 (L) 4.23 - 5.6 M/uL    Hemoglobin 12.1 (L) 13.6 - 17.2 g/dL    Hematocrit 37.1 %    MCV 93.5 79.6 - 97.8 FL    MCH 30.5 26.1 - 32.9 PG    MCHC 32.6 31.4 - 35.0 g/dL    RDW 14.3 11.9 - 14.6 %    Platelets 646 327 - 332 K/uL    MPV 11.2 9.4 - 12.3 FL    nRBC 0.00 0.0 - 0.2 K/uL    Differential Type AUTOMATED      Seg Neutrophils 60 43 - 78 %    Lymphocytes 18 13 - 44 %    Monocytes 12 4.0 - 12.0 %    Eosinophils % 9 (H) 0.5 - 7.8 %    Basophils 1 0.0 - 2.0 %    Immature Granulocytes 1 0.0 - 5.0 %    Segs Absolute 2.9 1.7 - 8.2 K/UL    Absolute Lymph # 0.9 0.5 - 4.6 K/UL    Absolute Mono # 0.6 0.1 - 1.3 K/UL    Absolute Eos # 0.4 0.0 - 0.8 K/UL    Basophils Absolute 0.0 0.0 - 0.2 K/UL    Absolute Immature Granulocyte 0.1 0.0 - 0.5 K/UL   Comprehensive Metabolic Panel    Collection Time: 06/02/22 10:09 AM   Result Value Ref Range    Sodium 137 136 - 145 mmol/L    Potassium 4.1 3.5 - 5.1 mmol/L    Chloride 108 (H) 98 - 107 mmol/L    CO2 21 21 - 32 mmol/L    Anion Gap 8 7 - 16 mmol/L    Glucose 102 (H) 65 - 100 mg/dL    BUN 19 8 - 23 MG/DL    CREATININE 1.80 (H) 0.8 - 1.5 MG/DL    GFR  American 50 (L) >60 ml/min/1.73m2    GFR Non- 41 (L) >60 ml/min/1.73m2    Calcium 8.5 8.3 - 10.4 MG/DL    Total Bilirubin 0.4 0.2 - 1.1 MG/DL    ALT 24 12 - 65 U/L    AST 14 (L) 15 - 37 U/L    Alk Phosphatase 68 50 - 136 U/L    Total Protein 5.8 (L) 6.3 - 8.2 g/dL    Albumin 3.5 3.2 - 4.6 g/dL    Globulin 2.3 2.3 - 3.5 g/dL    Albumin/Globulin Ratio 1.5 1.2 - 3.5     Comprehensive Metabolic Panel    Collection Time: 06/09/22  8:46 AM   Result Value Ref Range    Sodium 137 136 - 145 mmol/L    Potassium 3.8 3.5 - 5.1 mmol/L    Chloride 107 98 - 107 mmol/L    CO2 20 (L) 21 - 32 mmol/L    Anion Gap 10 7 - 16 mmol/L    Glucose 93 65 - 100 mg/dL    BUN 37 (H) 8 - 23 MG/DL    CREATININE 2.10 (H) 0.8 - 1.5 MG/DL    GFR  42 (L) >60 ml/min/1.73m2    GFR Non- 34 (L) >60 ml/min/1.73m2    Calcium 8.6 8.3 - 10.4 MG/DL    Total Bilirubin 0.5 0.2 - 1.1 MG/DL    ALT 24 12 - 65 U/L    AST 11 (L) 15 - 37 U/L    Alk Phosphatase 68 50 - 136 U/L    Total Protein 6.3 6.3 - 8.2 g/dL    Albumin 3.7 3.2 - 4.6 g/dL    Globulin 2.6 2.3 - 3.5 g/dL    Albumin/Globulin Ratio 1.4 1.2 - 3.5     Magnesium    Collection Time: 06/09/22  8:46 AM   Result Value Ref Range    Magnesium 2.1 1.8 - 2.4 mg/dL   CBC with Auto Differential    Collection Time: 06/09/22  8:46 AM   Result Value Ref Range    WBC 8.8 4.3 - 11.1 K/uL    RBC 4.50 4.23 - 5.6 M/uL    Hemoglobin 13.9 13.6 - 17.2 g/dL    Hematocrit 41.1 %    MCV 91.3 79.6 - 97.8 FL    MCH 30.9 26.1 - 32.9 PG    MCHC 33.8 31.4 - 35.0 g/dL    RDW 14.3 11.9 - 14.6 %    Platelets 452 (L) 908 - 450 K/uL    MPV 11.6 9.4 - 12.3 FL    nRBC 0.00 0.0 - 0.2 K/uL    Seg Neutrophils 72 43 - 78 %    Lymphocytes 15 13 - 44 %    Monocytes 8 4.0 - 12.0 %    Eosinophils % 4 0.5 - 7.8 %    Basophils 0 0.0 - 2.0 %    Immature Granulocytes 1 0.0 - 5.0 %    Segs Absolute 6.3 1.7 - 8.2 K/UL    Absolute Lymph # 1.3 0.5 - 4.6 K/UL    Absolute Mono # 0.7 0.1 - 1.3 K/UL    Absolute Eos # 0.4 0.0 - 0.8 K/UL    Basophils Absolute 0.0 0.0 - 0.2 K/UL    Absolute Immature Granulocyte 0.1 0.0 - 0.5 K/UL    Differential Type AUTOMATED         Imaging: reviewed      Labs\"    FLC:    10/15/21 - 10,133   10/19/21 - 13,936   10/22/21 - 11,215   12/2/21 - 5,843   12/30/21 671   1/2022 196   2/2022 23    3/2022 8.6   4/2022 3   5/2022 4.1      SPEP    10/15/21 - 1.73   12/2/21 - 0.8   12/30/21 0.4   1/2022 0.1    2/2022 0.1    4/2022 0.2      UPEP    10/15/21 - monoclonal IgA lambda is detected at 639 mg/dl (256 mg/24 hr) in the beta zone   1/2022 - no M spike          PATHOLOGY:         10/15/21 0219          PATHOLOGIST REVIEW  (NOTE)        Comment: RBCS- NORMOCHROMIC NORMOCYTIC ANEMIA; INCREASED ROULEAUX   WBCS AND  PLTS- ARE MORPHOLOGICALLY UNREMARKABLE     PER Sara Chowdhury MD                                                                  ASSESSMENT:        ASSESSMENT:     Diagnosis Orders   1. Multiple myeloma not having achieved remission (HCC)  CBC With Auto Differential    Comprehensive metabolic panel    CBC With Auto Differential    Comprehensive metabolic panel    CBC with Auto Differential    Comprehensive Metabolic Panel    Phosphorus   2. Immunocompromised ([address]     3. Neuropathy due to chemotherapeutic drug [facility])         Mr. Rl Bernard is here for FU of MM. 1. Multiple myeloma s/p manubrial lytic lesion bx and BMBX in 11/2021 per above    - 80-90% lambda; 46XY normal karyotype; gains 5,9,15 and dup 1q and IGH abnormality    - at dx: Cr up to 12.4 - started on HD, ca 10.7, Hb 8.5, , UA 11, B2M 16.6, albumin 2.9    - ISS - III, R-ISS III    - CyborD (due to renal failure) - added frida to C2D15   - on denosumab    - switched to VRD with C4 (now that renal fxn much better) plan to complete 8-12 cycles then transition to maintenance     - here for FU and C7D15 Frida/Velcade/Revlimid/Dex.   He is planning to start Revlimid 10mg dose adjusted for GFR and decreased frequency to increase tolerability D15-28 today. Add in daily baby aspirin with Revlimid. - back pain - improved overall, back slightly past few days. Would like to hold off on PET/CT for now. - YANETH - He is seeing nephrology. Cr at 1.8 today. Encouraged adequate fluid intake. On allopurinol; did not tolerate rasburicase (rxn). - osseous lesions - For denosumab every 28 days. Dental eval obtained prior   - neuropathy - minimal - mild numbness in fingers noted, not bothersome. No neuropathy in the feet. - transplant eval - He has not seen Dr Laurita Marte per my recs for transplant consultation. Ideally would recommend 8-12 cycles of VRD with bortez maintenance  (since was not tolerating Rev well). - Will plan for Bmbx after ~C8.     - prophy - dose adjusted Bactrim/diflucan and acyclovir. - Echo reviewed and normal.     - Hypokalemia: c/w supplement MWF    -constipation: encouraged use of miralax and/or stool softener   - pain - not taking meds rx'ed as prescribed; Sternal plasmacytoma mostly resolved. - Lenalidomide po will be adjusted based on CrCl:    CrCl ? 60 mL/minute: 25 mg once daily (no dosage adjustment necessary). CrCl 30 to 59 mL/minute: 10 mg once daily (may increase to 15 mg once daily in the absence of toxicity). CrCl 15 to 29 mL/minute: 15 mg once every other day; may adjust to 10 mg once daily. - b12 defic - s/p inj x 4 - will monitor intermittently, ~500 today - can supplement PO or inj x1 to get closer to ULN    - vit D - take at least 2KIU daily   - s/p colonoscopy - fu with GI per their recs   - HTN - amlodipine - to monitor BP at home and let us know if remains elevated or too low, yury when having HAs   - changes in vision - has not seen his eye doctor >12mo, plans an apt for re-eval         RTC per protocol      MDM    Lab studies and imaging studies were personally reviewed. Pertinent old records were reviewed. All questions were asked and answered to the best of my ability. The patient verbalized understanding and agrees with the plan above.             RHIANNON Farr [id number] H[facility]ology [facility] Oncology  [address] [facility], [address] [place] [address]  Office : [phone]  Fax : [phone]

## 2022-07-14 DIAGNOSIS — F51.01 PRIMARY INSOMNIA: ICD-10-CM

## 2022-07-14 RX ORDER — ZOLPIDEM TARTRATE 10 MG/1
10 TABLET ORAL NIGHTLY PRN
Qty: 30 TABLET | Refills: 0 | Status: SHIPPED | OUTPATIENT
Start: 2022-07-14 | End: 2022-08-12 | Stop reason: SDUPTHER

## 2022-07-14 RX ORDER — ZOLPIDEM TARTRATE 10 MG/1
10 TABLET ORAL NIGHTLY PRN
COMMUNITY
End: 2022-07-14 | Stop reason: SDUPTHER

## 2022-07-14 NOTE — TELEPHONE ENCOUNTER
PDMP reviewed, refilling zolpidem for 1 month.  Please call the patient and ask him to schedule an appointment to reestablish with a primary care provider within 1 month and also so that he can follow-up on his insomnia.  Thank you!    Ree Pickard MD, MPH  [employer] [facility], PGY-3

## 2022-07-14 NOTE — TELEPHONE ENCOUNTER
Last seen: 3/16/22 by Dr. Kraft  Next appt: None      Does patient have an active prescription for medications requested? No    Received Request Via: Pharmacy

## 2022-07-28 ENCOUNTER — APPOINTMENT (OUTPATIENT)
Dept: INTERNAL MEDICINE | Facility: OTHER | Age: 72
End: 2022-07-28
Payer: COMMERCIAL

## 2022-08-11 ENCOUNTER — OFFICE VISIT (OUTPATIENT)
Dept: INTERNAL MEDICINE | Facility: OTHER | Age: 72
End: 2022-08-11
Payer: MEDICARE

## 2022-08-11 VITALS
HEART RATE: 78 BPM | TEMPERATURE: 98.3 F | WEIGHT: 239.4 LBS | BODY MASS INDEX: 29.77 KG/M2 | DIASTOLIC BLOOD PRESSURE: 83 MMHG | SYSTOLIC BLOOD PRESSURE: 150 MMHG | OXYGEN SATURATION: 98 % | HEIGHT: 75 IN

## 2022-08-11 DIAGNOSIS — N18.2 STAGE 2 CHRONIC KIDNEY DISEASE: ICD-10-CM

## 2022-08-11 DIAGNOSIS — K21.9 GASTROESOPHAGEAL REFLUX DISEASE WITHOUT ESOPHAGITIS: ICD-10-CM

## 2022-08-11 DIAGNOSIS — R80.9 PROTEINURIA, UNSPECIFIED TYPE: ICD-10-CM

## 2022-08-11 DIAGNOSIS — E11.41 TYPE 2 DIABETES MELLITUS WITH DIABETIC MONONEUROPATHY, WITH LONG-TERM CURRENT USE OF INSULIN (HCC): ICD-10-CM

## 2022-08-11 DIAGNOSIS — Z79.4 TYPE 2 DIABETES MELLITUS WITH DIABETIC MONONEUROPATHY, WITH LONG-TERM CURRENT USE OF INSULIN (HCC): ICD-10-CM

## 2022-08-11 DIAGNOSIS — E11.42 DIABETIC POLYNEUROPATHY ASSOCIATED WITH TYPE 2 DIABETES MELLITUS (HCC): ICD-10-CM

## 2022-08-11 DIAGNOSIS — I10 PRIMARY HYPERTENSION: ICD-10-CM

## 2022-08-11 DIAGNOSIS — Z00.00 PREVENTATIVE HEALTH CARE: ICD-10-CM

## 2022-08-11 DIAGNOSIS — I25.10 CORONARY ARTERY DISEASE INVOLVING NATIVE CORONARY ARTERY OF NATIVE HEART WITHOUT ANGINA PECTORIS: ICD-10-CM

## 2022-08-11 DIAGNOSIS — E78.5 DYSLIPIDEMIA: ICD-10-CM

## 2022-08-11 DIAGNOSIS — Z11.59 ENCOUNTER FOR SCREENING FOR VIRAL DISEASE: ICD-10-CM

## 2022-08-11 DIAGNOSIS — F51.01 PRIMARY INSOMNIA: ICD-10-CM

## 2022-08-11 PROBLEM — E11.40 DIABETIC NEUROPATHY (HCC): Status: ACTIVE | Noted: 2022-08-11

## 2022-08-11 PROBLEM — R21 SKIN RASH: Status: RESOLVED | Noted: 2022-03-16 | Resolved: 2022-08-11

## 2022-08-11 PROCEDURE — 90715 TDAP VACCINE 7 YRS/> IM: CPT

## 2022-08-11 PROCEDURE — 99214 OFFICE O/P EST MOD 30 MIN: CPT | Mod: 25,GC

## 2022-08-11 PROCEDURE — 90471 IMMUNIZATION ADMIN: CPT

## 2022-08-11 RX ORDER — GABAPENTIN 800 MG/1
800 TABLET ORAL 3 TIMES DAILY
COMMUNITY
Start: 2022-05-27 | End: 2023-12-13 | Stop reason: SDUPTHER

## 2022-08-11 RX ORDER — DULOXETIN HYDROCHLORIDE 30 MG/1
30 CAPSULE, DELAYED RELEASE ORAL DAILY
Qty: 30 CAPSULE | Refills: 3 | Status: SHIPPED | OUTPATIENT
Start: 2022-08-11 | End: 2022-08-12 | Stop reason: SDUPTHER

## 2022-08-11 RX ORDER — INSULIN DETEMIR 100 [IU]/ML
INJECTION, SOLUTION SUBCUTANEOUS
COMMUNITY
Start: 2022-07-09 | End: 2023-05-04

## 2022-08-11 RX ORDER — SEMAGLUTIDE 1.34 MG/ML
INJECTION, SOLUTION SUBCUTANEOUS
COMMUNITY
Start: 2022-06-28 | End: 2023-05-04

## 2022-08-11 RX ORDER — EMPAGLIFLOZIN 25 MG/1
1 TABLET, FILM COATED ORAL
COMMUNITY
Start: 2022-07-12 | End: 2023-02-28 | Stop reason: SINTOL

## 2022-08-11 ASSESSMENT — ENCOUNTER SYMPTOMS
NAUSEA: 0
ABDOMINAL PAIN: 0
TINGLING: 1
EYES NEGATIVE: 1
CHILLS: 0
GASTROINTESTINAL NEGATIVE: 1
VOMITING: 0
PALPITATIONS: 0
MUSCULOSKELETAL NEGATIVE: 1
RESPIRATORY NEGATIVE: 1
HEARTBURN: 0
FEVER: 0
CARDIOVASCULAR NEGATIVE: 1
CONSTITUTIONAL NEGATIVE: 1
PSYCHIATRIC NEGATIVE: 1
SORE THROAT: 0

## 2022-08-11 NOTE — ASSESSMENT & PLAN NOTE
Patient stated he was naïve to duloxetine.    Plan  - We will start duloxetine 30 mg daily  - Continue gabapentin 800 mg 3 times daily  - topical methylsulfonylmethane is not explicitly recommended nor contraindicated.  Patient may continue if there is perceived benefit.

## 2022-08-11 NOTE — PROGRESS NOTES
New Patient  CC: Establish Care with Primary Care Physician  Chief Complaint   Patient presents with    Follow-Up     Re-establish    Medication Refill     Needs refills on meds and would like to discuss those    Dyslipidemia    Insomnia     Takes ambien 3x per week for that    Diabetes     Type 2 diabetes       HPI:    Dejan Mooney is a 71 y.o. male w/ PMH s/f DMII A1c 7.8% c/b diabetic neuropathy followed by Dr. Durant, Insomnia on ambien 10 mg every other day, CAD & aortic stenosis followed by Dr. Trujillo, HTN on irbesartan 300 mg daily & HCTZ 25 mg, GERD on occasional famotidine, DLD on atorvastatin 80 mg, BPPV, overweight who presents today with poor control of neuropathic pain, for zolpidem refill, and to reestablish.    At presentation, the patient states that he has no current neuropathic pain, but does have 3 out of 10 burning pain in his bilateral feet on a daily basis despite being on gabapentin 800 mg 3 times daily as instructed.  This pain is worse with light touch such as sleeping under sheets and has been improved by over-the-counter supplement methylsulfonylmethane.  Patient also notices that he sometimes has difficulty sensing his feet in space and with foot placement.    Patient states he also would like a zolpidem refill.  The patient states he only needs 1 or half a pill every other day in order to help fall asleep with ongoing nerve pain.  Patient states he has had no problems with the medication.    ROS:     Review of Systems   Constitutional: Negative.  Negative for chills and fever.   HENT: Negative.  Negative for hearing loss and sore throat.    Eyes: Negative.    Respiratory: Negative.     Cardiovascular: Negative.  Negative for chest pain and palpitations.   Gastrointestinal: Negative.  Negative for abdominal pain, heartburn, nausea and vomiting.   Genitourinary: Negative.    Musculoskeletal: Negative.    Skin: Negative.  Negative for itching and rash.   Neurological:   Positive for tingling.   Endo/Heme/Allergies: Negative.    Psychiatric/Behavioral: Negative.       Past Medical History:   Diagnosis Date    BBB (bundle branch block)     Diabetes     insulin and oral medication    Heart burn     Hyperlipidemia     Hypertension     Indigestion     Pain     knee, shoulder    Pneumonia 1974    Psychiatric problem     depression    Renal calculus 7/2013    kidney stones    Snoring     sleep apnea questionairre completed       Patient Active Problem List    Diagnosis Date Noted    Encounter for screening for viral disease 03/17/2022    Skin rash 03/16/2022    Preventative health care 02/08/2022    Obesity with body mass index 30 or greater 02/07/2022    Tear of medial meniscus of knee 08/24/2021    Dyslipidemia 08/11/2021    Obstructive sleep apnea, adult 07/26/2021    Gastroesophageal reflux disease 03/11/2021    Stage 2 chronic kidney disease 07/20/2020    Proteinuria 07/20/2020    Primary insomnia 04/15/2020    Type 2 diabetes mellitus with diabetic mononeuropathy (HCC) 09/12/2017    Coronary artery disease 06/13/2017    Primary hypertension 06/13/2017       Past Surgical History:   Procedure Laterality Date    RECOVERY  5/13/2014    Performed by Cath-Recovery Surgery at SURGERY SAME DAY [facility] ORS    OTHER  2/2014    right knee    KNEE ARTHROSCOPY  8/22/2013    Performed by Maurisio Alfaro M.D. at SURGERY [facility] ORS    MENISCECTOMY, KNEE, MEDIAL  8/22/2013    Performed by Maurisio Alfaro M.D. at SURGERY [facility] ORS    SHOULDER ARTHROTOMY  2003    right    KNEE ARTHROSCOPY  1990    left    KNEE ARTHROSCOPY  1985    right    ORIF, ANKLE  1984    left    ORIF, KNEE  1970    left    TONSILLECTOMY  as child       Family History   Problem Relation Age of Onset    Heart Disease Mother     Hypertension Mother     Heart Disease Father     Hypertension Father     Atrial fibrillation Brother        Social History     Tobacco Use    Smoking status: Never    Smokeless  tobacco: Never   Vaping Use    Vaping Use: Never used   Substance Use Topics    Alcohol use: Yes     Alcohol/week: 1.2 oz     Types: 2 Standard drinks or equivalent per week    Drug use: No       Current Outpatient Medications   Medication Sig Dispense Refill    JARDIANCE 25 MG Tab Take 1 Tablet by mouth every day.      gabapentin (NEURONTIN) 800 MG tablet Take 800 mg by mouth 3 times a day.      LEVEMIR FLEXTOUCH 100 UNIT/ML injection PEN INJECT 30 UNITS UNDER THE SKIN TWICE DAILY      OZEMPIC, 1 MG/DOSE, 4 MG/3ML Solution Pen-injector INJECT 1MG UNDER THE SKIN TWICE A WEEK      zolpidem (AMBIEN) 10 MG Tab Take 1 Tablet by mouth at bedtime as needed for Sleep for up to 30 days. Must make appt for further refills 30 Tablet 0    miconazole (MICOTIN) 2 % Cream Apply 2 mL topically 2 times a day as needed. 118 mL 1    atorvastatin (LIPITOR) 80 MG tablet Take 1 Tablet by mouth every evening. 90 Tablet 3    irbesartan (AVAPRO) 300 MG Tab Take 1 Tablet by mouth every day. 90 Tablet 3    carvedilol (COREG) 6.25 MG Tab Take 1 Tablet by mouth 2 times a day with meals. 180 Tablet 3    Continuous Blood Gluc Sensor (FREESTYLE HANH 14 DAY SENSOR) Misc FREESTYLE HANH 14 DAY SENSOR      BD PEN NEEDLE MICRO U/F 32G X 6 MM Misc       metFORMIN (GLUCOPHAGE) 500 MG Tab Take 500 mg by mouth 3 times a day. Take 500 mg in the morning and 1000 mg in the pm      insulin detemir (LEVEMIR) 100 UNIT/ML Solution Inject 25 Units under the skin 2 times a day.      Semaglutide, 1 MG/DOSE, (OZEMPIC, 1 MG/DOSE,) 2 MG/1.5ML Solution Pen-injector every 7 days.      promethazine (PHENERGAN) 25 MG Tab Take 1 Tab by mouth every 6 hours as needed for Nausea/Vomiting. 30 Tab 0    ASPIRIN 81 MG PO TABS Take 81 mg by mouth every day.      gabapentin (NEURONTIN) 400 MG Cap Take 800 mg by mouth 3 times a day. (Patient not taking: Reported on 8/11/2022)      hydroCHLOROthiazide (HYDRODIURIL) 25 MG Tab Take 1 Tablet by mouth every day. (Patient not taking:  "Reported on 8/11/2022) 90 Tablet 3     No current facility-administered medications for this visit.       Allergies:    Penicillins and Norvasc [amlodipine]    Physical Exam:    BP (!) 150/83 (BP Location: Left arm, Patient Position: Sitting, BP Cuff Size: Adult)   Pulse 78   Temp 36.8 °C (98.3 °F) (Temporal)   Ht 1.905 m (6' 3\")   Wt 109 kg (239 lb 6.4 oz)   SpO2 98%   BMI 29.92 kg/m²     General: Well-developed, well-nourished male in no distress  HEENT: NC/AT  Eyes: Conjuntiva without any obvious injection or erythema.   Mouth: mucous membranes moist, no erythema  Lungs: Clear to auscultation bilaterally with good respiratory effort.  No wheezes, crackles or rhonci are heard.  Heart: Normal rate, regular rhythm with no murmurs, rubs or gallops heard.  Abdomen: Soft, non-tender, non-distended. No guarding or rigidity. No organomegaly noted.  Extremites: No cyanosis/clubbing/edema. Visible shallow ulcer on pad of 3rd digit of left foot.  Skin: Warm and dry.  No visible rashes.  Neuro: Alert & oriented, grossly non-focal  Psych: Patient is awake, alert and oriented with recent and remote memory intact. Affect normal, mood normal, judgment normal.    Monofilament  Monofilament testing with a 10 gram force: sensation intact: decreased bilaterally  Visual Inspection: Feet with maceration, ulcers, fissures.  Pedal pulses: decreased bilaterally      Assessment and Plan:     Diabetic neuropathy (HCC)  Patient stated he was naïve to duloxetine.    Plan  - We will start duloxetine 30 mg daily  - Continue gabapentin 800 mg 3 times daily  - topical methylsulfonylmethane is not explicitly recommended nor contraindicated.  Patient may continue if there is perceived benefit.    Dyslipidemia  Patient has not had recent labs.  Patient states he is compliant with atorvastatin 80 mg daily    Plan  - Recheck lipid labs    Primary hypertension  Blood pressure is elevated at this visit.  However patient states that he forgot to " take his blood pressure meds this morning.  Patient was instructed to bring a blood pressure log for his next visit.    Plan  - Continue irbesartan 300 mg daily  - Continue hydrochlorothiazide 25 mg daily and will check electrolyte labs  - Beta-blockers managed by cardiology    Primary insomnia  Patient was counseled on possible side effects of zolpidem as well as the importance of trying to wean oneself off of this medication as we get older due to increasing complications such as memory loss.  Patient states he did not see sleep psychiatrist Dr. Nielsen due to getting COVID right before his scheduled appointment.    Plan  - Refill zolpidem 10 mg  - Patient instructed to reschedule with sleep psychiatry.  Referral is still active from previous visit.    Encounter for screening for viral disease  Hep B was ordered at last encounter.  Patient has not fulfilled this lab yet.    Plan  - Hep B screening lab already ordered; to be drawn with other labs at next time he visits the lab    Proteinuria  Followed by Dr. Najjar with nephrology    Type 2 diabetes mellitus with diabetic mononeuropathy (HCC)  Managed by Dr. Durant with endocrinology    Stage 2 chronic kidney disease  Followed by Dr. Najjar with nephrology    Coronary artery disease  Followed by Dr. Trujillo with cardiology    Gastroesophageal reflux disease  Patient reports he is only taking Pepcid occasionally for his GERD.  Patient was instructed to continue with occasional over-the-counter Pepcid as needed    Preventative health care  Tdap administered at this visit    Colonoscopy done winter 2021    All imaging results and lab results and consult notes are reviewed at this visit.    Signed by: Hector Vázquez M.D.

## 2022-08-11 NOTE — ASSESSMENT & PLAN NOTE
Hep B was ordered at last encounter.  Patient has not fulfilled this lab yet.    Plan  - Hep B screening lab already ordered; to be drawn with other labs at next time he visits the lab

## 2022-08-11 NOTE — ASSESSMENT & PLAN NOTE
Patient has not had recent labs.  Patient states he is compliant with atorvastatin 80 mg daily    Plan  - Recheck lipid labs

## 2022-08-11 NOTE — ASSESSMENT & PLAN NOTE
Patient reports he is only taking Pepcid occasionally for his GERD.  Patient was instructed to continue with occasional over-the-counter Pepcid as needed

## 2022-08-11 NOTE — ASSESSMENT & PLAN NOTE
Blood pressure is elevated at this visit.  However patient states that he forgot to take his blood pressure meds this morning.  Patient was instructed to bring a blood pressure log for his next visit.    Plan  - Continue irbesartan 300 mg daily  - Continue hydrochlorothiazide 25 mg daily and will check electrolyte labs  - Beta-blockers managed by cardiology

## 2022-08-11 NOTE — ASSESSMENT & PLAN NOTE
Patient was counseled on possible side effects of zolpidem as well as the importance of trying to wean oneself off of this medication as we get older due to increasing complications such as memory loss.  Patient states he did not see sleep psychiatrist Dr. Nielsen due to getting COVID right before his scheduled appointment.    Plan  - Refill zolpidem 10 mg  - Patient instructed to reschedule with sleep psychiatry.  Referral is still active from previous visit.

## 2022-08-12 RX ORDER — ZOLPIDEM TARTRATE 10 MG/1
10 TABLET ORAL NIGHTLY PRN
Qty: 30 TABLET | Refills: 0 | Status: SHIPPED | OUTPATIENT
Start: 2022-08-12 | End: 2022-09-11

## 2022-08-12 RX ORDER — DULOXETIN HYDROCHLORIDE 30 MG/1
30 CAPSULE, DELAYED RELEASE ORAL DAILY
Qty: 90 CAPSULE | Refills: 0 | Status: SHIPPED | OUTPATIENT
Start: 2022-08-12 | End: 2022-12-13

## 2022-11-04 ENCOUNTER — PATIENT MESSAGE (OUTPATIENT)
Dept: HEALTH INFORMATION MANAGEMENT | Facility: OTHER | Age: 72
End: 2022-11-04

## 2022-11-18 DIAGNOSIS — F51.01 PRIMARY INSOMNIA: ICD-10-CM

## 2022-11-18 NOTE — TELEPHONE ENCOUNTER
Zolpidem Refill     Received request via: Pharmacy    Was the patient seen in the last year in this department? Yes    Does the patient have an active prescription (recently filled or refills available) for medication(s) requested? No    Does the patient have USP Plus and need 100 day supply (blood pressure, diabetes and cholesterol meds only)? Patient does not have SCP

## 2022-12-07 RX ORDER — ZOLPIDEM TARTRATE 10 MG/1
TABLET ORAL
Qty: 30 TABLET | OUTPATIENT
Start: 2022-12-07

## 2022-12-08 NOTE — TELEPHONE ENCOUNTER
The patient was instructed at our last visit to follow up with a sleep psychiatrist. Due to the patient's age, I do not feel comfortable continuing to prescribe a medication on the Beer's list while he is also prescribed other sedative medications. I would like to see Mr. Mooney back in clinic and discuss barriers to sleep or seeing a sleep psychiatrist before writing this prescription again.

## 2022-12-12 NOTE — TELEPHONE ENCOUNTER
Spoke with pt, he has an appt 12/3/22 with Dr. Tran and he will discuss the medication at that time.

## 2022-12-13 ENCOUNTER — APPOINTMENT (OUTPATIENT)
Dept: INTERNAL MEDICINE | Facility: OTHER | Age: 72
End: 2022-12-13
Payer: MEDICARE

## 2022-12-13 ENCOUNTER — TELEMEDICINE (OUTPATIENT)
Dept: INTERNAL MEDICINE | Facility: OTHER | Age: 72
End: 2022-12-13
Payer: MEDICARE

## 2022-12-13 ENCOUNTER — TELEPHONE (OUTPATIENT)
Dept: INTERNAL MEDICINE | Facility: OTHER | Age: 72
End: 2022-12-13

## 2022-12-13 VITALS
HEIGHT: 75 IN | DIASTOLIC BLOOD PRESSURE: 78 MMHG | SYSTOLIC BLOOD PRESSURE: 148 MMHG | BODY MASS INDEX: 29.72 KG/M2 | WEIGHT: 239 LBS

## 2022-12-13 DIAGNOSIS — E11.41 TYPE 2 DIABETES MELLITUS WITH DIABETIC MONONEUROPATHY, WITH LONG-TERM CURRENT USE OF INSULIN (HCC): ICD-10-CM

## 2022-12-13 DIAGNOSIS — U07.1 COVID-19: ICD-10-CM

## 2022-12-13 DIAGNOSIS — Z79.4 TYPE 2 DIABETES MELLITUS WITH DIABETIC MONONEUROPATHY, WITH LONG-TERM CURRENT USE OF INSULIN (HCC): ICD-10-CM

## 2022-12-13 DIAGNOSIS — F51.01 PRIMARY INSOMNIA: ICD-10-CM

## 2022-12-13 DIAGNOSIS — I10 PRIMARY HYPERTENSION: ICD-10-CM

## 2022-12-13 DIAGNOSIS — B35.9 TINEA: ICD-10-CM

## 2022-12-13 PROCEDURE — 99214 OFFICE O/P EST MOD 30 MIN: CPT | Performed by: INTERNAL MEDICINE

## 2022-12-13 RX ORDER — NIRMATRELVIR AND RITONAVIR 300-100 MG
KIT ORAL
COMMUNITY
Start: 2022-12-12 | End: 2023-02-28

## 2022-12-13 RX ORDER — GUAIFENESIN AND DEXTROMETHORPHAN HYDROBROMIDE 100; 10 MG/5ML; MG/5ML
5 SOLUTION ORAL EVERY 6 HOURS PRN
Qty: 840 ML | Refills: 1 | Status: SHIPPED | OUTPATIENT
Start: 2022-12-13 | End: 2023-07-06

## 2022-12-13 RX ORDER — ZOLPIDEM TARTRATE 10 MG/1
10 TABLET ORAL NIGHTLY PRN
Qty: 30 TABLET | Refills: 0 | Status: SHIPPED | OUTPATIENT
Start: 2022-12-13 | End: 2023-01-12

## 2022-12-13 RX ORDER — HYDROCHLOROTHIAZIDE 25 MG/1
25 TABLET ORAL DAILY
Qty: 90 TABLET | Refills: 1 | Status: SHIPPED | OUTPATIENT
Start: 2022-12-13 | End: 2023-05-04

## 2022-12-13 RX ORDER — FLUCONAZOLE 150 MG/1
150 TABLET ORAL
Qty: 2 TABLET | Refills: 0 | Status: SHIPPED | OUTPATIENT
Start: 2022-12-13 | End: 2023-05-04

## 2022-12-13 NOTE — PROGRESS NOTES
Established Patient    Chief Complaint   Patient presents with    Medication Refill       HPI: Dejan Mooney is a 72 y.o. male  with past medical history as below who presented to the clinic for the following.    #COVID   -tested positive yesterday, symptoms stuffy nose, sore throat, cough that is mildly productive, no diarrhea, body aches, headaches. Symptoms started Saturday evening. Fully vaccinated for COVID 4 times, vaccinated for flu, paxlovid started last night by another provider, no fevers , oxygen is good 96%, pulse : high 80s,   Patient taking Dayquil and Nyquil, guaifenesin, would like something else for cough as well.       #Insomnia    Had appointment with Dr. BULLOCK - sleep Psychology- was told that he is not taking Ambien frequently enough to raise concerns.   2 or three times a week. Usually gets prescription of 30 tablets 10 mg that lasts him 3 months.  Has been using it on and off for 10 years   Both staying asleep and falling asleep are interrupted , 4-6 hours without Ambien, 6-8 hours with Ambien.   Has not tried trazodone.Has tried melatonin and practices good sleep hygiene     #Diabetic neuropathy   -Went back to gabapentin   Tried Duloxetine for 60 days without any result.     #Fungal infection  -Of groin.  Similar to the  past, has tried diflucan which helped, miconazole did not help.   Denies any pus oozing or difference from previous infections.     #Hypertension   -Well controlled as per patient        Patient Active Problem List    Diagnosis Date Noted    Diabetic neuropathy (HCC) 08/11/2022    Encounter for screening for viral disease 03/17/2022    Preventative health care 02/08/2022    Obesity with body mass index 30 or greater 02/07/2022    Tear of medial meniscus of knee 08/24/2021    Dyslipidemia 08/11/2021    Obstructive sleep apnea, adult 07/26/2021    Gastroesophageal reflux disease 03/11/2021    Stage 2 chronic kidney disease 07/20/2020    Proteinuria 07/20/2020     Primary insomnia 04/15/2020    Type 2 diabetes mellitus with diabetic mononeuropathy (HCC) 09/12/2017    Coronary artery disease 06/13/2017    Primary hypertension 06/13/2017       Current Outpatient Medications   Medication Sig Dispense Refill    hydroCHLOROthiazide (HYDRODIURIL) 25 MG Tab Take 1 Tablet by mouth every day. 90 Tablet 1    metFORMIN (GLUCOPHAGE) 500 MG Tab Take 1 Tablet by mouth 3 times a day. Take 500 mg in the morning and 1000 mg in the pm 270 Tablet 1    zolpidem (AMBIEN) 10 MG Tab Take 1 Tablet by mouth at bedtime as needed for Sleep for up to 30 days. 30 Tablet 0    fluconazole (DIFLUCAN) 150 MG tablet Take 1 Tablet by mouth every 7 days. 2 Tablet 0    Dextromethorphan-guaiFENesin (TUSSIN DM)  MG/5ML Syrup Take 5 mL by mouth every 6 hours as needed (cough). 840 mL 1    JARDIANCE 25 MG Tab Take 1 Tablet by mouth every day.      gabapentin (NEURONTIN) 800 MG tablet Take 800 mg by mouth 3 times a day.      LEVEMIR FLEXTOUCH 100 UNIT/ML injection PEN INJECT 30 UNITS UNDER THE SKIN TWICE DAILY      OZEMPIC, 1 MG/DOSE, 4 MG/3ML Solution Pen-injector INJECT 1MG UNDER THE SKIN TWICE A WEEK      miconazole (MICOTIN) 2 % Cream Apply 2 mL topically 2 times a day as needed. 118 mL 1    atorvastatin (LIPITOR) 80 MG tablet Take 1 Tablet by mouth every evening. 90 Tablet 3    irbesartan (AVAPRO) 300 MG Tab Take 1 Tablet by mouth every day. 90 Tablet 3    carvedilol (COREG) 6.25 MG Tab Take 1 Tablet by mouth 2 times a day with meals. 180 Tablet 3    Continuous Blood Gluc Sensor (FREESTYLE HANH 14 DAY SENSOR) Misc FREESTYLE HANH 14 DAY SENSOR      BD PEN NEEDLE MICRO U/F 32G X 6 MM Misc       insulin detemir (LEVEMIR) 100 UNIT/ML Solution Inject 25 Units under the skin 2 times a day.      Semaglutide, 1 MG/DOSE, (OZEMPIC, 1 MG/DOSE,) 2 MG/1.5ML Solution Pen-injector every 7 days.      promethazine (PHENERGAN) 25 MG Tab Take 1 Tab by mouth every 6 hours as needed for Nausea/Vomiting. 30 Tab 0     "ASPIRIN 81 MG PO TABS Take 81 mg by mouth every day.      PAXLOVID, 300/100, 20 x 150 MG & 10 x 100MG Tablet Therapy Pack        No current facility-administered medications for this visit.       ROS: As per HPI. Additional pertinent systems as noted below.  Constitutional:  Negative for fever, chills   HENT:  Negative for ear pain,   Eyes:  Negative for blurred vision and double vision   Cardiovascular:  Negative for chest pain, palpitations   Respiratory:  Negative for shortness of breath   Gastrointestinal: Negative for abdominal pain, nausea, vomiting, diarrhea, or blood in stools   Skin: As in hpi    BP (!) 148/78   Ht 1.905 m (6' 3\")   Wt 108 kg (239 lb)   BMI 29.87 kg/m²     Physical Exam   Constitutional:  Well developed, well nourished. Not in acute distress   Cardiovascular - no obvious deformities.   Respiratory: Symmetric chest rise,   Note: I have reviewed all pertinent labs and diagnostic tests associated with this visit with specific comments listed under the assessment and plan below    Assessment and Plan    1. COVID-19  Guaifenesin dextromethorphan prescribed.   Tylenol as needed.   Adequate hydration   Monitor pulse ox.   Recommended tessalon pearls if needed.   Present to ER if developing shortness of breath, fever or worsening symptoms.     2. Primary insomnia  Refill for 30 days placed   To be readdressed on next visit. Consider trazodone which patient has not tried before.  Sleep hygiene reinforced and adverse effects of Ambien discussed    3. Tinea  Groin fungal infection reported by the patient which has responded to fluconazole in the past. This is likely Tinea cruris   Fluconazole 150 once weekly for 2 weeks prescribed.   To be readdressed on next visit.   No known interaction with paxlovid.    4. Primary hypertension  Controlled chronic as per patient.   Placing refill for HZTC 25 mg     5. Type 2 diabetes mellitus with diabetic mononeuropathy, with long-term current use of insulin " (HCC)  Patients neuropathy symptoms controlled with gabapentin currently   Duloxetine reported not to have an effect despite 60 days of use.     Followup: Return in about 3 months (around 3/13/2023).        Signed by: Jason Tran M.D.

## 2022-12-13 NOTE — PATIENT INSTRUCTIONS
I am prescribing Dextromethorphan and guaifenesin syrup for cough. You may also try Tessalon pears for the cough. I am placing refills for metformin, ambien, and HZTC. I am also placing a prescription for diflucan for 2 weeks.  We shall talk about trying another medication for sleep on the next visit.   If your covid symptoms are getting worse, or if you develop shortness of breath/fever then present yourself to the ER.

## 2022-12-13 NOTE — PROGRESS NOTES
Established Patient    No chief complaint on file.      HPI: Dejan Mooney is a 72 y.o. male with past medical history as below  who presented to the clinic for the following.    Blood pressure of 150/83  On HZTC 25, ibesartan 300, coreg 6.25  Renal calculi on ct     #Neuropathy   -Duloxetine was started on the last visit.    #Insomnia   On zolpidem   Sleep psychiatrist     Hep C screening   Colonoscopy           Patient Active Problem List    Diagnosis Date Noted    Diabetic neuropathy (HCC) 08/11/2022    Encounter for screening for viral disease 03/17/2022    Preventative health care 02/08/2022    Obesity with body mass index 30 or greater 02/07/2022    Tear of medial meniscus of knee 08/24/2021    Dyslipidemia 08/11/2021    Obstructive sleep apnea, adult 07/26/2021    Gastroesophageal reflux disease 03/11/2021    Stage 2 chronic kidney disease 07/20/2020    Proteinuria 07/20/2020    Primary insomnia 04/15/2020    Type 2 diabetes mellitus with diabetic mononeuropathy (HCC) 09/12/2017    Coronary artery disease 06/13/2017    Primary hypertension 06/13/2017       Current Outpatient Medications   Medication Sig Dispense Refill    DULoxetine (CYMBALTA) 30 MG Cap DR Particles Take 1 Capsule by mouth every day. 90 Capsule 0    JARDIANCE 25 MG Tab Take 1 Tablet by mouth every day.      gabapentin (NEURONTIN) 800 MG tablet Take 800 mg by mouth 3 times a day.      LEVEMIR FLEXTOUCH 100 UNIT/ML injection PEN INJECT 30 UNITS UNDER THE SKIN TWICE DAILY      OZEMPIC, 1 MG/DOSE, 4 MG/3ML Solution Pen-injector INJECT 1MG UNDER THE SKIN TWICE A WEEK      miconazole (MICOTIN) 2 % Cream Apply 2 mL topically 2 times a day as needed. 118 mL 1    atorvastatin (LIPITOR) 80 MG tablet Take 1 Tablet by mouth every evening. 90 Tablet 3    hydroCHLOROthiazide (HYDRODIURIL) 25 MG Tab Take 1 Tablet by mouth every day. (Patient not taking: Reported on 8/11/2022) 90 Tablet 3    irbesartan (AVAPRO) 300 MG Tab Take 1 Tablet by  mouth every day. 90 Tablet 3    carvedilol (COREG) 6.25 MG Tab Take 1 Tablet by mouth 2 times a day with meals. 180 Tablet 3    Continuous Blood Gluc Sensor (FREESTYLE HANH 14 DAY SENSOR) Misc FREESTYLE HANH 14 DAY SENSOR      BD PEN NEEDLE MICRO U/F 32G X 6 MM Misc       metFORMIN (GLUCOPHAGE) 500 MG Tab Take 500 mg by mouth 3 times a day. Take 500 mg in the morning and 1000 mg in the pm      insulin detemir (LEVEMIR) 100 UNIT/ML Solution Inject 25 Units under the skin 2 times a day.      Semaglutide, 1 MG/DOSE, (OZEMPIC, 1 MG/DOSE,) 2 MG/1.5ML Solution Pen-injector every 7 days.      promethazine (PHENERGAN) 25 MG Tab Take 1 Tab by mouth every 6 hours as needed for Nausea/Vomiting. 30 Tab 0    ASPIRIN 81 MG PO TABS Take 81 mg by mouth every day.       No current facility-administered medications for this visit.       ROS: As per HPI. Additional pertinent systems as noted below.  Constitutional:  Negative for fever, chills   HENT:  Negative for ear pain, sore throat, runny nose   Eyes:  Negative for blurred vision and double vision   Cardiovascular:  Negative for chest pain, palpitations   Respiratory:  Negative for cough, sputum production, shortness of breath   Gastrointestinal: Negative for abdominal pain, nausea, vomiting, diarrhea, or blood in stools   Genitourinary: Negative for dysuria, flank pain and hematuria  Skin: Negative for rash, itching  Extremities: Negative for leg swelling   Neurologic: Negative for headaches, dizziness, seizures, weakness   Endo/Heme/Allergies: Negative for polydipsia. Does not bruise/bleed easily  Psychiatric: Negative for suicidal or homicidal ideas     There were no vitals taken for this visit.    Physical Exam   Constitutional:  Well developed, well nourished. Not in acute distress   HENT:  Normocephalic, Atraumatic, Oropharynx is pink and moist. No oral exudates, Nose normal   Eyes:  EOMI, Conjunctiva normal, No discharge. PERRLA   Neck:  Normal range of motion, No  cervical lymphadenopathy. No thyromegaly.   Cardiovascular:  Regular rate and rhythm, No pedal edema, Intact distal pulses   Respiratory: Clear to auscultation bilaterally, No use of accessory muscles   Gastrointestinal: Bowel sounds normal, Soft, No tenderness, No palpable masses/hepatosplenomegaly   Skin: Warm, Dry, No erythema, No rash, no induration.   Musculoskeletal: No cyanosis or clubbing, normal ROM   Neurologic: Alert & oriented x 4, No focal deficits noted, cranial nerves II through X are grossly intact.   Psychiatric: Judgment normal, Mood normal, Memory normal     Note: I have reviewed all pertinent labs and diagnostic tests associated with this visit with specific comments listed under the assessment and plan below    Assessment and Plan  No problem-specific Assessment & Plan notes found for this encounter.      Followup: No follow-ups on file.        Signed by: Jaosn Tran M.D.

## 2022-12-14 RX ORDER — DEXTROMETHORPHAN HYDROBROMIDE, GUAIFENESIN 10; 100 MG/5ML; MG/5ML
LIQUID ORAL
OUTPATIENT
Start: 2022-12-14

## 2023-02-15 DIAGNOSIS — I25.10 CORONARY ARTERY DISEASE INVOLVING NATIVE CORONARY ARTERY OF NATIVE HEART WITHOUT ANGINA PECTORIS: ICD-10-CM

## 2023-02-15 NOTE — TELEPHONE ENCOUNTER
Is the patient due for a refill? Yes    Was the patient seen the past year? Yes    Date of last office visit: 2/22/22    Does the patient have an upcoming appointment?  No   If yes, When?     Provider to refill:CALLUM    Does the patients insurance require a 100 day supply?  No

## 2023-02-16 ENCOUNTER — TELEPHONE (OUTPATIENT)
Dept: CARDIOLOGY | Facility: MEDICAL CENTER | Age: 73
End: 2023-02-16
Payer: COMMERCIAL

## 2023-02-16 RX ORDER — CARVEDILOL 6.25 MG/1
TABLET ORAL
Qty: 180 TABLET | Refills: 0 | Status: SHIPPED | OUTPATIENT
Start: 2023-02-16 | End: 2023-05-04

## 2023-02-16 NOTE — TELEPHONE ENCOUNTER
Called pharmacy and confirmed they received it today and will have it ready in a couple hours.     Called pt and advised that his pharmacy did receive the refill. I also scheduled follow up on 5/4/2023.

## 2023-02-16 NOTE — TELEPHONE ENCOUNTER
CALLUM     Caller: Dejan Mooney    Medication Name and Dosage: carvedilol (COREG) 6.25 MG Tab [[id number]     Medication amount left: 1 days worth     Preferred Pharmacy: [facility] on file     Other questions (Topic): Pharmacy is informing patient that they are in need of the subscribers approval before filling this medication. Please advise.     Callback Number (Will only call for issues): [phone] (home) [phone] (work)    Thank you,   Zaida SMITH

## 2023-02-28 ENCOUNTER — OFFICE VISIT (OUTPATIENT)
Dept: INTERNAL MEDICINE | Facility: OTHER | Age: 73
End: 2023-02-28
Payer: MEDICARE

## 2023-02-28 ENCOUNTER — TELEPHONE (OUTPATIENT)
Dept: INTERNAL MEDICINE | Facility: OTHER | Age: 73
End: 2023-02-28

## 2023-02-28 VITALS
TEMPERATURE: 98.3 F | HEART RATE: 93 BPM | HEIGHT: 75 IN | DIASTOLIC BLOOD PRESSURE: 69 MMHG | SYSTOLIC BLOOD PRESSURE: 122 MMHG | WEIGHT: 236 LBS | OXYGEN SATURATION: 94 % | BODY MASS INDEX: 29.34 KG/M2

## 2023-02-28 DIAGNOSIS — E11.41 TYPE 2 DIABETES MELLITUS WITH DIABETIC MONONEUROPATHY, WITH LONG-TERM CURRENT USE OF INSULIN (HCC): ICD-10-CM

## 2023-02-28 DIAGNOSIS — F51.01 PRIMARY INSOMNIA: ICD-10-CM

## 2023-02-28 DIAGNOSIS — Z79.4 TYPE 2 DIABETES MELLITUS WITH DIABETIC MONONEUROPATHY, WITH LONG-TERM CURRENT USE OF INSULIN (HCC): ICD-10-CM

## 2023-02-28 PROBLEM — Z11.59 ENCOUNTER FOR SCREENING FOR VIRAL DISEASE: Status: RESOLVED | Noted: 2022-03-17 | Resolved: 2023-02-28

## 2023-02-28 PROCEDURE — 99214 OFFICE O/P EST MOD 30 MIN: CPT | Mod: GC

## 2023-02-28 RX ORDER — ZOLPIDEM TARTRATE 10 MG/1
10 TABLET ORAL NIGHTLY PRN
COMMUNITY
End: 2023-05-04

## 2023-02-28 RX ORDER — DOXEPIN 3 MG/1
3 TABLET, FILM COATED ORAL NIGHTLY
Qty: 30 TABLET | Refills: 0 | Status: SHIPPED | OUTPATIENT
Start: 2023-02-28 | End: 2023-02-28 | Stop reason: CLARIF

## 2023-02-28 ASSESSMENT — LIFESTYLE VARIABLES: SUBSTANCE_ABUSE: 0

## 2023-02-28 ASSESSMENT — ENCOUNTER SYMPTOMS
NERVOUS/ANXIOUS: 0
SENSORY CHANGE: 0
MUSCULOSKELETAL NEGATIVE: 1
RESPIRATORY NEGATIVE: 1
INSOMNIA: 1
EYES NEGATIVE: 1
CHILLS: 0
GASTROINTESTINAL NEGATIVE: 1
CARDIOVASCULAR NEGATIVE: 1
WEIGHT LOSS: 1
MEMORY LOSS: 0
FEVER: 0
TINGLING: 1
DEPRESSION: 0

## 2023-02-28 ASSESSMENT — PATIENT HEALTH QUESTIONNAIRE - PHQ9: CLINICAL INTERPRETATION OF PHQ2 SCORE: 0

## 2023-02-28 NOTE — TELEPHONE ENCOUNTER
Received fax from pharmacy, the Doxepin is not covered by insurance, the covered alternatives are Trazodone or Belsomra. Please review and advise?

## 2023-02-28 NOTE — PROGRESS NOTES
"    Established Patient    Patient Care Team:  Hector Vázquez M.D. as PCP - General (Internal Medicine)  Jamie Pittman M.D. (Endocrinology)  Walter Trujillo M.D. as Cardiologist (Cardiovascular Disease (Cardiology))    CC:   Chief Complaint   Patient presents with    Follow-Up    Medication Refill     Ambien       HPI:  Dejan Mooney is a 72 y.o. male who presents today with the following Chief Complaint(s): Follow up for primary insomnia and \"general checkup.\"    The patient is requesting refill for Ambien for primary insomnia. The patient states he has tried trazodone circa 2013 during a period of adjustment disorder but stated at that time it caused irritability and twitching. He states he has also tried melatonin in the past with no success. The patient is able to recite sleep hygiene counseling from memory to me. Risks of Ambien and Beers' criteria discussed at length with the patient. The patient agreed to a 1-2 month trial of suvorexant at this time. Controlled Substance Use contract signed.    The patient notes he has had multiple urinary tract infections with white purulent discharge and has decided to stop taking his Jardiance. Dr. Durant continues to manage his diabetes care.    The patient continues to have severe pain due to his diabetic neuropathy. He reports unchanged numbness in the bottom of his feet but burning pain to light sensation with sheets running over his feet with partial relief with gabapentin. He has noticed cracking in the calluses of his toes that has caused sharp pains that prevents him from his primary source of exercise which is using a stationary bike.    ROS:       Review of Systems   Constitutional:  Positive for weight loss. Negative for chills, fever and malaise/fatigue.   HENT: Negative.     Eyes: Negative.    Respiratory: Negative.     Cardiovascular: Negative.    Gastrointestinal: Negative.    Genitourinary: Negative.    Musculoskeletal: Negative.    Skin: " Negative.    Neurological:  Positive for tingling. Negative for sensory change.   Endo/Heme/Allergies: Negative.    Psychiatric/Behavioral:  Negative for depression, memory loss and substance abuse. The patient has insomnia. The patient is not nervous/anxious.        Past Medical History:   Diagnosis Date    BBB (bundle branch block)     Diabetes     insulin and oral medication    Heart burn     Hyperlipidemia     Hypertension     Indigestion     Pain     knee, shoulder    Pneumonia 1974    Psychiatric problem     depression    Renal calculus 7/2013    kidney stones    Skin rash 3/16/2022    Snoring     sleep apnea questionairre completed     Patient Active Problem List    Diagnosis Date Noted    Diabetic neuropathy (HCC) 08/11/2022    Preventative health care 02/08/2022    Obesity with body mass index 30 or greater 02/07/2022    Tear of medial meniscus of knee 08/24/2021    Dyslipidemia 08/11/2021    Obstructive sleep apnea, adult 07/26/2021    Gastroesophageal reflux disease 03/11/2021    Stage 2 chronic kidney disease 07/20/2020    Proteinuria 07/20/2020    Primary insomnia 04/15/2020    Type 2 diabetes mellitus with diabetic mononeuropathy (HCC) 09/12/2017    Coronary artery disease 06/13/2017    Primary hypertension 06/13/2017     Social History     Tobacco Use    Smoking status: Never    Smokeless tobacco: Never   Vaping Use    Vaping Use: Never used   Substance Use Topics    Alcohol use: Yes     Alcohol/week: 1.2 oz     Types: 2 Standard drinks or equivalent per week    Drug use: No     Current Outpatient Medications   Medication Sig Dispense Refill    zolpidem (AMBIEN) 10 MG Tab Take 10 mg by mouth at bedtime as needed for Sleep.      metFORMIN (GLUCOPHAGE) 500 MG Tab Take 1 Tablet by mouth 3 times a day. Take 500 mg in the morning and 1000 mg in the pm 270 Tablet 1    Doxepin HCl 3 MG Tab Take 3 mg by mouth every evening. 30 Tablet 0    carvedilol (COREG) 6.25 MG Tab TAKE 1 TABLET BY MOUTH TWICE DAILY WITH  MEALS 180 Tablet 0    hydroCHLOROthiazide (HYDRODIURIL) 25 MG Tab Take 1 Tablet by mouth every day. 90 Tablet 1    fluconazole (DIFLUCAN) 150 MG tablet Take 1 Tablet by mouth every 7 days. 2 Tablet 0    Dextromethorphan-guaiFENesin (TUSSIN DM)  MG/5ML Syrup Take 5 mL by mouth every 6 hours as needed (cough). 840 mL 1    gabapentin (NEURONTIN) 800 MG tablet Take 800 mg by mouth 3 times a day.      LEVEMIR FLEXTOUCH 100 UNIT/ML injection PEN INJECT 30 UNITS UNDER THE SKIN TWICE DAILY      OZEMPIC, 1 MG/DOSE, 4 MG/3ML Solution Pen-injector INJECT 1MG UNDER THE SKIN TWICE A WEEK      miconazole (MICOTIN) 2 % Cream Apply 2 mL topically 2 times a day as needed. 118 mL 1    atorvastatin (LIPITOR) 80 MG tablet Take 1 Tablet by mouth every evening. 90 Tablet 3    irbesartan (AVAPRO) 300 MG Tab Take 1 Tablet by mouth every day. 90 Tablet 3    Continuous Blood Gluc Sensor (FREESTYLE HANH 14 DAY SENSOR) Misc FREESTYLE HANH 14 DAY SENSOR      BD PEN NEEDLE MICRO U/F 32G X 6 MM Misc       insulin detemir (LEVEMIR) 100 UNIT/ML Solution Inject 25 Units under the skin 2 times a day.      Semaglutide, 1 MG/DOSE, (OZEMPIC, 1 MG/DOSE,) 2 MG/1.5ML Solution Pen-injector every 7 days.      promethazine (PHENERGAN) 25 MG Tab Take 1 Tab by mouth every 6 hours as needed for Nausea/Vomiting. 30 Tab 0    ASPIRIN 81 MG PO TABS Take 81 mg by mouth every day.       No current facility-administered medications for this visit.     Past Surgical History:   Procedure Laterality Date    RECOVERY  5/13/2014    Performed by Cath-Recovery Surgery at [facility]ERY [facility]    OTHER  2/2014    right knee    KNEE ARTHROSCOPY  8/22/2013    Performed by Maurisio Alfaro M.D. at SURGERY [facility]    MENISCECTOMY, KNEE, MEDIAL  8/22/2013    Performed by Maurisio Alfaro M.D. at SURGERY [facility]    SHOULDER ARTHROTOMY  2003    right    KNEE ARTHROSCOPY  1990    left    KNEE ARTHROSCOPY  1985    right    ORIF, ANKLE  1984  "   left    ORIF, KNEE  1970    left    TONSILLECTOMY  as child     Family History   Problem Relation Age of Onset    Heart Disease Mother     Hypertension Mother     Heart Disease Father     Hypertension Father     Atrial fibrillation Brother      Social History     Tobacco Use    Smoking status: Never    Smokeless tobacco: Never   Vaping Use    Vaping Use: Never used   Substance Use Topics    Alcohol use: Yes     Alcohol/week: 1.2 oz     Types: 2 Standard drinks or equivalent per week    Drug use: No       Allergies:  Penicillins and Norvasc [amlodipine]    Physical Exam:  /69 (BP Location: Left arm, Patient Position: Sitting, BP Cuff Size: Adult)   Pulse 93   Temp 36.8 °C (98.3 °F) (Temporal)   Ht 1.892 m (6' 2.5\")   Wt 107 kg (236 lb)   SpO2 94%   BMI 29.90 kg/m²     General: Well-developed, well-nourished male in no distress  HEENT: NC/AT  Eyes: Conjuntiva without any obvious injection or erythema.   Mouth: mucous membranes moist, no erythema  Neck: Supple, moves spontaneously  Lungs: Clear to auscultation bilaterally with good respiratory effort.  No wheezes, crackles or rhonci are heard.  Heart: Normal rate, regular rhythm with no murmurs, rubs or gallops heard.  Abdomen: Soft, non-tender, non-distended. No guarding or rigidity. No organomegaly noted.  Extremites: No cyanosis/clubbing/edema. No obvious deformities. Superficial friction injuries noted on dorsal surface of digits 1-4 of bilateral feet and cracking calluses of medial aspect of bilateral halluces.  No ulcers of bilateral feet.  Skin: Warm and dry with flaking of superficial plaque.  No visible rashes.  Neuro: Alert & oriented, grossly non-focal in upper extremities.  Psych: Patient is awake, alert and oriented with recent and remote memory intact. Affect normal, mood normal, judgment normal.    Assessment and Plan:   1. Type 2 diabetes mellitus with diabetic mononeuropathy, with long-term current use of insulin ([facility])  2. Diabetic " neuropathy  Patient's neuropathy puts patient at increased risk for further injury to feet. At present, not seeing podiatrist due to his previous provider retiring.   - metFORMIN (GLUCOPHAGE) 500 MG Tab; Take 1 Tablet by mouth 3 times a day. Take 500 mg in the morning and 1000 mg in the pm  Dispense: 270 Tablet; Refill: 1  - Referral to Podiatry    3. Primary insomnia  Ambien on Beers' list. Script for doxepin rejected by insurance due to being not on patient's formulary. Alternatives include trazadone which he reports previous intolerance to vs Suvorexant. Literature supporting suvorexant is well tolerated in patients age > 65 y.o. (doi: 10.1016/j.jagp.2017.03.004).  - Controlled Substance Treatment Agreement  - Duvorexant 10 mg daily. Patient instructed to try this dose for 1 week and titrate according to symptoms of insomnia to maximum of 20 mg if patient is unresponsive.    All imaging results and lab results and consult notes are reviewed at this visit.    Signed by: Hector Vázquez M.D.  PGY I

## 2023-02-28 NOTE — TELEPHONE ENCOUNTER
Discussed with patient, will change prescription to Belsomra to replace doxepin to accommodate for his insurance formulary.

## 2023-03-18 DIAGNOSIS — I10 PRIMARY HYPERTENSION: ICD-10-CM

## 2023-03-20 NOTE — TELEPHONE ENCOUNTER
Is the patient due for a refill? Yes    Was the patient seen the past year? No    Date of last office visit: 2/22/2022    Does the patient have an upcoming appointment?  Yes   If yes, When? 5/4/2023    Provider to refill:CALLUM    Does the patients insurance require a 100 day supply?  No

## 2023-03-21 RX ORDER — IRBESARTAN 300 MG/1
300 TABLET ORAL DAILY
Qty: 90 TABLET | Refills: 0 | Status: SHIPPED | OUTPATIENT
Start: 2023-03-21 | End: 2023-05-04

## 2023-04-24 ENCOUNTER — TELEPHONE (OUTPATIENT)
Dept: CARDIOLOGY | Facility: MEDICAL CENTER | Age: 73
End: 2023-04-24
Payer: COMMERCIAL

## 2023-04-24 DIAGNOSIS — E78.5 DYSLIPIDEMIA: ICD-10-CM

## 2023-04-24 RX ORDER — ATORVASTATIN CALCIUM 80 MG/1
80 TABLET, FILM COATED ORAL NIGHTLY
Qty: 90 TABLET | Refills: 0 | Status: SHIPPED | OUTPATIENT
Start: 2023-04-24 | End: 2023-05-04

## 2023-04-24 NOTE — TELEPHONE ENCOUNTER
Phone Number Called: [phone]    Call outcome: Spoke to patient regarding message below.    Message:   Spoke to pt regarding refills. Pt had the irbesartan refilled on 03/21/2023 for a 90 day supply, so he should have enough to get him to his appt with JM on 05/04/2023. Pt states that he had been travelling around that time, so he is going to check with the pharmacy about this medication. He does still need a refill for the atorvastatin 80 mg daily. Confirmed preferred pharmacy is what we have on file.     Refilled Rx for atorvastatin for 90 day supply and no refills.

## 2023-04-24 NOTE — TELEPHONE ENCOUNTER
CALLUM    Caller: Sammy Castelan    Medication Name and Dosage:    irbesartan (AVAPRO) 300 MG Tab [[id number]]    atorvastatin (LIPITOR) 40 MG Tab [[id number]]    Medication amount left: 0    [facility]  [place]    Other questions (Topic): N/A    Callback Number (Will only call for issues): [phone]    Thank you,   Judie HUERTAS

## 2023-05-01 ENCOUNTER — APPOINTMENT (RX ONLY)
Dept: URBAN - METROPOLITAN AREA CLINIC 35 | Facility: CLINIC | Age: 73
Setting detail: DERMATOLOGY
End: 2023-05-01

## 2023-05-01 DIAGNOSIS — D22 MELANOCYTIC NEVI: ICD-10-CM

## 2023-05-01 DIAGNOSIS — L57.0 ACTINIC KERATOSIS: ICD-10-CM

## 2023-05-01 DIAGNOSIS — Z71.89 OTHER SPECIFIED COUNSELING: ICD-10-CM

## 2023-05-01 DIAGNOSIS — L81.4 OTHER MELANIN HYPERPIGMENTATION: ICD-10-CM

## 2023-05-01 DIAGNOSIS — I87.2 VENOUS INSUFFICIENCY (CHRONIC) (PERIPHERAL): ICD-10-CM

## 2023-05-01 DIAGNOSIS — L82.1 OTHER SEBORRHEIC KERATOSIS: ICD-10-CM

## 2023-05-01 PROBLEM — D22.61 MELANOCYTIC NEVI OF RIGHT UPPER LIMB, INCLUDING SHOULDER: Status: ACTIVE | Noted: 2023-05-01

## 2023-05-01 PROCEDURE — ? COUNSELING

## 2023-05-01 PROCEDURE — ? LIQUID NITROGEN

## 2023-05-01 PROCEDURE — ? MEDICATION COUNSELING

## 2023-05-01 PROCEDURE — 17003 DESTRUCT PREMALG LES 2-14: CPT

## 2023-05-01 PROCEDURE — ? PRESCRIPTION

## 2023-05-01 PROCEDURE — ? TREATMENT REGIMEN

## 2023-05-01 PROCEDURE — 99203 OFFICE O/P NEW LOW 30 MIN: CPT | Mod: 25

## 2023-05-01 PROCEDURE — 17000 DESTRUCT PREMALG LESION: CPT

## 2023-05-01 PROCEDURE — ? ORDER FOR PHOTODYNAMIC THERAPY

## 2023-05-01 RX ORDER — TRIAMCINOLONE ACETONIDE 1 MG/G
1 CREAM TOPICAL BID
Qty: 80 | Refills: 3 | Status: ERX | COMMUNITY
Start: 2023-05-01

## 2023-05-01 RX ADMIN — TRIAMCINOLONE ACETONIDE 1: 1 CREAM TOPICAL at 00:00

## 2023-05-01 ASSESSMENT — LOCATION DETAILED DESCRIPTION DERM
LOCATION DETAILED: RIGHT LATERAL TRAPEZIAL NECK
LOCATION DETAILED: RIGHT PROXIMAL PRETIBIAL REGION
LOCATION DETAILED: RIGHT POSTERIOR SHOULDER
LOCATION DETAILED: LEFT RADIAL DORSAL HAND
LOCATION DETAILED: RIGHT RADIAL DORSAL HAND

## 2023-05-01 ASSESSMENT — LOCATION SIMPLE DESCRIPTION DERM
LOCATION SIMPLE: LEFT HAND
LOCATION SIMPLE: POSTERIOR NECK
LOCATION SIMPLE: RIGHT HAND
LOCATION SIMPLE: RIGHT PRETIBIAL REGION
LOCATION SIMPLE: RIGHT SHOULDER

## 2023-05-01 ASSESSMENT — LOCATION ZONE DERM
LOCATION ZONE: NECK
LOCATION ZONE: HAND
LOCATION ZONE: ARM
LOCATION ZONE: LEG

## 2023-05-01 NOTE — PROCEDURE: LIQUID NITROGEN
Application Tool (Optional): Cry-AC
Post-Care Instructions: I reviewed with the patient in detail post-care instructions. Patient is to wear sunprotection, and avoid picking at any of the treated lesions. Pt may apply Vaseline to crusted or scabbing areas.
Consent: The patient's consent was obtained including but not limited to risks of crusting, scabbing, blistering, scarring, darker or lighter pigmentary change, recurrence, incomplete removal and infection.
Duration Of Freeze Thaw-Cycle (Seconds): 10
Render Post-Care Instructions In Note?: no
Show Aperture Variable?: Yes
Number Of Freeze-Thaw Cycles: 1 freeze-thaw cycle
Detail Level: Detailed

## 2023-05-01 NOTE — HPI: FULL BODY SKIN EXAMINATION
How Severe Are Your Spot(S)?: mild
What Type Of Note Output Would You Prefer (Optional)?: Standard Output
What Is The Reason For Today's Visit?: Full Body Skin Examination
What Is The Reason For Today's Visit? (Being Monitored For X): concerning skin lesions on a periodic basis
Additional History: Patient complains

## 2023-05-01 NOTE — PROCEDURE: MEDICATION COUNSELING
Arava Counseling:  Patient counseled regarding adverse effects of Arava including but not limited to nausea, vomiting, abnormalities in liver function tests. Patients may develop mouth sores, rash, diarrhea, and abnormalities in blood counts. The patient understands that monitoring is required including LFTs and blood counts.  There is a rare possibility of scarring of the liver and lung problems that can occur when taking methotrexate. Persistent nausea, loss of appetite, pale stools, dark urine, cough, and shortness of breath should be reported immediately. Patient advised to discontinue Arava treatment and consult with a physician prior to attempting conception. The patient will have to undergo a treatment to eliminate Arava from the body prior to conception.
Propranolol Counseling:  I discussed with the patient the risks of propranolol including but not limited to low heart rate, low blood pressure, low blood sugar, restlessness and increased cold sensitivity. They should call the office if they experience any of these side effects.
Libtayo Counseling- I discussed with the patient the risks of Libtayo including but not limited to nausea, vomiting, diarrhea, and bone or muscle pain.  The patient verbalized understanding of the proper use and possible adverse effects of Libtayo.  All of the patient's questions and concerns were addressed.
Enbrel Pregnancy And Lactation Text: This medication is Pregnancy Category B and is considered safe during pregnancy. It is unknown if this medication is excreted in breast milk.
Acitretin Pregnancy And Lactation Text: This medication is Pregnancy Category X and should not be given to women who are pregnant or may become pregnant in the future. This medication is excreted in breast milk.
High Dose Vitamin A Counseling: Side effects reviewed, pt to contact office should one occur.
Minoxidil Pregnancy And Lactation Text: This medication has not been assigned a Pregnancy Risk Category but animal studies failed to show danger with the topical medication. It is unknown if the medication is excreted in breast milk.
Griseofulvin Pregnancy And Lactation Text: This medication is Pregnancy Category X and is known to cause serious birth defects. It is unknown if this medication is excreted in breast milk but breast feeding should be avoided.
Birth Control Pills Pregnancy And Lactation Text: This medication should be avoided if pregnant and for the first 30 days post-partum.
Topical Steroids Applications Pregnancy And Lactation Text: Most topical steroids are considered safe to use during pregnancy and lactation.  Any topical steroid applied to the breast or nipple should be washed off before breastfeeding.
Isotretinoin Counseling: Patient should get monthly blood tests, not donate blood, not drive at night if vision affected, not share medication, and not undergo elective surgery for 6 months after tx completed. Side effects reviewed, pt to contact office should one occur.
Rituxan Counseling:  I discussed with the patient the risks of Rituxan infusions. Side effects can include infusion reactions, severe drug rashes including mucocutaneous reactions, reactivation of latent hepatitis and other infections and rarely progressive multifocal leukoencephalopathy.  All of the patient's questions and concerns were addressed.
Rifampin Counseling: I discussed with the patient the risks of rifampin including but not limited to liver damage, kidney damage, red-orange body fluids, nausea/vomiting and severe allergy.
VTAMA Counseling: I discussed with the patient that VTAMA is not for use in the eyes, mouth or mouth. They should call the office if they develop any signs of allergic reactions to VTAMA. The patient verbalized understanding of the proper use and possible adverse effects of VTAMA.  All of the patient's questions and concerns were addressed.
Doxycycline Pregnancy And Lactation Text: This medication is Pregnancy Category D and not consider safe during pregnancy. It is also excreted in breast milk but is considered safe for shorter treatment courses.
Adbry Counseling: I discussed with the patient the risks of tralokinumab including but not limited to eye infection and irritation, cold sores, injection site reactions, worsening of asthma, allergic reactions and increased risk of parasitic infection.  Live vaccines should be avoided while taking tralokinumab. The patient understands that monitoring is required and they must alert us or the primary physician if symptoms of infection or other concerning signs are noted.
Glycopyrrolate Counseling:  I discussed with the patient the risks of glycopyrrolate including but not limited to skin rash, drowsiness, dry mouth, difficulty urinating, and blurred vision.
Doxepin Counseling:  Patient advised that the medication is sedating and not to drive a car after taking this medication. Patient informed of potential adverse effects including but not limited to dry mouth, urinary retention, and blurry vision.  The patient verbalized understanding of the proper use and possible adverse effects of doxepin.  All of the patient's questions and concerns were addressed.
Nsaids Pregnancy And Lactation Text: These medications are considered safe up to 30 weeks gestation. It is excreted in breast milk.
Cyclosporine Counseling:  I discussed with the patient the risks of cyclosporine including but not limited to hypertension, gingival hyperplasia,myelosuppression, immunosuppression, liver damage, kidney damage, neurotoxicity, lymphoma, and serious infections. The patient understands that monitoring is required including baseline blood pressure, CBC, CMP, lipid panel and uric acid, and then 1-2 times monthly CMP and blood pressure.
Azithromycin Counseling:  I discussed with the patient the risks of azithromycin including but not limited to GI upset, allergic reaction, drug rash, diarrhea, and yeast infections.
Eucrisa Counseling: Patient may experience a mild burning sensation during topical application. Eucrisa is not approved in children less than 2 years of age.
Olumiant Pregnancy And Lactation Text: Based on animal studies, Olumiant may cause embryo-fetal harm when administered to pregnant women.  The medication should not be used in pregnancy.  Breastfeeding is not recommended during treatment.
Tazorac Counseling:  Patient advised that medication is irritating and drying.  Patient may need to apply sparingly and wash off after an hour before eventually leaving it on overnight.  The patient verbalized understanding of the proper use and possible adverse effects of tazorac.  All of the patient's questions and concerns were addressed.
Carac Pregnancy And Lactation Text: This medication is Pregnancy Category X and contraindicated in pregnancy and in women who may become pregnant. It is unknown if this medication is excreted in breast milk.
Taltz Counseling: I discussed with the patient the risks of ixekizumab including but not limited to immunosuppression, serious infections, worsening of inflammatory bowel disease and drug reactions.  The patient understands that monitoring is required including a PPD at baseline and must alert us or the primary physician if symptoms of infection or other concerning signs are noted.
Propranolol Pregnancy And Lactation Text: This medication is Pregnancy Category C and it isn't known if it is safe during pregnancy. It is excreted in breast milk.
Qbrexza Pregnancy And Lactation Text: There is no available data on Qbrexza use in pregnant women.  There is no available data on Qbrexza use in lactation.
Bexarotene Counseling:  I discussed with the patient the risks of bexarotene including but not limited to hair loss, dry lips/skin/eyes, liver abnormalities, hyperlipidemia, pancreatitis, depression/suicidal ideation, photosensitivity, drug rash/allergic reactions, hypothyroidism, anemia, leukopenia, infection, cataracts, and teratogenicity.  Patient understands that they will need regular blood tests to check lipid profile, liver function tests, white blood cell count, thyroid function tests and pregnancy test if applicable.
Use Enhanced Medication Counseling?: No
Arava Pregnancy And Lactation Text: This medication is Pregnancy Category X and is absolutely contraindicated during pregnancy. It is unknown if it is excreted in breast milk.
Libtayo Pregnancy And Lactation Text: This medication is contraindicated in pregnancy and when breast feeding.
Rifampin Pregnancy And Lactation Text: This medication is Pregnancy Category C and it isn't know if it is safe during pregnancy. It is also excreted in breast milk and should not be used if you are breast feeding.
Humira Counseling:  I discussed with the patient the risks of adalimumab including but not limited to myelosuppression, immunosuppression, autoimmune hepatitis, demyelinating diseases, lymphoma, and serious infections.  The patient understands that monitoring is required including a PPD at baseline and must alert us or the primary physician if symptoms of infection or other concerning signs are noted.
Mirvaso Counseling: Mirvaso is a topical medication which can decrease superficial blood flow where applied. Side effects are uncommon and include stinging, redness and allergic reactions.
Rituxan Pregnancy And Lactation Text: This medication is Pregnancy Category C and it isn't know if it is safe during pregnancy. It is unknown if this medication is excreted in breast milk but similar antibodies are known to be excreted.
High Dose Vitamin A Pregnancy And Lactation Text: High dose vitamin A therapy is contraindicated during pregnancy and breast feeding.
Isotretinoin Pregnancy And Lactation Text: This medication is Pregnancy Category X and is considered extremely dangerous during pregnancy. It is unknown if it is excreted in breast milk.
Rinvoq Counseling: I discussed with the patient the risks of Rinvoq therapy including but not limited to upper respiratory tract infections, shingles, cold sores, bronchitis, nausea, cough, fever, acne, and headache. Live vaccines should be avoided.  This medication has been linked to serious infections; higher rate of mortality; malignancy and lymphoproliferative disorders; major adverse cardiovascular events; thrombosis; thrombocytopenia, anemia, and neutropenia; lipid elevations; liver enzyme elevations; and gastrointestinal perforations.
Spironolactone Counseling: Patient advised regarding risks of diarrhea, abdominal pain, hyperkalemia, birth defects (for female patients), liver toxicity and renal toxicity. The patient may need blood work to monitor liver and kidney function and potassium levels while on therapy. The patient verbalized understanding of the proper use and possible adverse effects of spironolactone.  All of the patient's questions and concerns were addressed.
Itraconazole Counseling:  I discussed with the patient the risks of itraconazole including but not limited to liver damage, nausea/vomiting, neuropathy, and severe allergy.  The patient understands that this medication is best absorbed when taken with acidic beverages such as non-diet cola or ginger ale.  The patient understands that monitoring is required including baseline LFTs and repeat LFTs at intervals.  The patient understands that they are to contact us or the primary physician if concerning signs are noted.
Tazorac Pregnancy And Lactation Text: This medication is not safe during pregnancy. It is unknown if this medication is excreted in breast milk.
Topical Sulfur Applications Counseling: Topical Sulfur Counseling: Patient counseled that this medication may cause skin irritation or allergic reactions.  In the event of skin irritation, the patient was advised to reduce the amount of the drug applied or use it less frequently.   The patient verbalized understanding of the proper use and possible adverse effects of topical sulfur application.  All of the patient's questions and concerns were addressed.
Olanzapine Counseling- I discussed with the patient the common side effects of olanzapine including but are not limited to: lack of energy, dry mouth, increased appetite, sleepiness, tremor, constipation, dizziness, changes in behavior, or restlessness.  Explained that teenagers are more likely to experience headaches, abdominal pain, pain in the arms or legs, tiredness, and sleepiness.  Serious side effects include but are not limited: increased risk of death in elderly patients who are confused, have memory loss, or dementia-related psychosis; hyperglycemia; increased cholesterol and triglycerides; and weight gain.
Adbry Pregnancy And Lactation Text: It is unknown if this medication will adversely affect pregnancy or breast feeding.
Erythromycin Counseling:  I discussed with the patient the risks of erythromycin including but not limited to GI upset, allergic reaction, drug rash, diarrhea, increase in liver enzymes, and yeast infections.
Cyclosporine Pregnancy And Lactation Text: This medication is Pregnancy Category C and it isn't know if it is safe during pregnancy. This medication is excreted in breast milk.
Vtama Pregnancy And Lactation Text: It is unknown if this medication can cause problems during pregnancy and breastfeeding.
Bexarotene Pregnancy And Lactation Text: This medication is Pregnancy Category X and should not be given to women who are pregnant or may become pregnant. This medication should not be used if you are breast feeding.
Glycopyrrolate Pregnancy And Lactation Text: This medication is Pregnancy Category B and is considered safe during pregnancy. It is unknown if it is excreted breast milk.
Doxepin Pregnancy And Lactation Text: This medication is Pregnancy Category C and it isn't known if it is safe during pregnancy. It is also excreted in breast milk and breast feeding isn't recommended.
Azithromycin Pregnancy And Lactation Text: This medication is considered safe during pregnancy and is also secreted in breast milk.
Calcipotriene Counseling:  I discussed with the patient the risks of calcipotriene including but not limited to erythema, scaling, itching, and irritation.
Siliq Counseling:  I discussed with the patient the risks of Siliq including but not limited to new or worsening depression, suicidal thoughts and behavior, immunosuppression, malignancy, posterior leukoencephalopathy syndrome, and serious infections.  The patient understands that monitoring is required including a PPD at baseline and must alert us or the primary physician if symptoms of infection or other concerning signs are noted. There is also a special program designed to monitor depression which is required with Siliq.
Rhofade Counseling: Rhofade is a topical medication which can decrease superficial blood flow where applied. Side effects are uncommon and include stinging, redness and allergic reactions.
Taltz Pregnancy And Lactation Text: The risk during pregnancy and breastfeeding is uncertain with this medication.
Mirvaso Pregnancy And Lactation Text: This medication has not been assigned a Pregnancy Risk Category. It is unknown if the medication is excreted in breast milk.
Clofazimine Counseling:  I discussed with the patient the risks of clofazimine including but not limited to skin and eye pigmentation, liver damage, nausea/vomiting, gastrointestinal bleeding and allergy.
Odomzo Counseling- I discussed with the patient the risks of Odomzo including but not limited to nausea, vomiting, diarrhea, constipation, weight loss, changes in the sense of taste, decreased appetite, muscle spasms, and hair loss.  The patient verbalized understanding of the proper use and possible adverse effects of Odomzo.  All of the patient's questions and concerns were addressed.
Topical Sulfur Applications Pregnancy And Lactation Text: This medication is Pregnancy Category C and has an unknown safety profile during pregnancy. It is unknown if this topical medication is excreted in breast milk.
Methotrexate Counseling:  Patient counseled regarding adverse effects of methotrexate including but not limited to nausea, vomiting, abnormalities in liver function tests. Patients may develop mouth sores, rash, diarrhea, and abnormalities in blood counts. The patient understands that monitoring is required including LFT's and blood counts.  There is a rare possibility of scarring of the liver and lung problems that can occur when taking methotrexate. Persistent nausea, loss of appetite, pale stools, dark urine, cough, and shortness of breath should be reported immediately. Patient advised to discontinue methotrexate treatment at least three months before attempting to become pregnant.  I discussed the need for folate supplements while taking methotrexate.  These supplements can decrease side effects during methotrexate treatment. The patient verbalized understanding of the proper use and possible adverse effects of methotrexate.  All of the patient's questions and concerns were addressed.
Sarecycline Counseling: Patient advised regarding possible photosensitivity and discoloration of the teeth, skin, lips, tongue and gums.  Patient instructed to avoid sunlight, if possible.  When exposed to sunlight, patients should wear protective clothing, sunglasses, and sunscreen.  The patient was instructed to call the office immediately if the following severe adverse effects occur:  hearing changes, easy bruising/bleeding, severe headache, or vision changes.  The patient verbalized understanding of the proper use and possible adverse effects of sarecycline.  All of the patient's questions and concerns were addressed.
Olanzapine Pregnancy And Lactation Text: This medication is pregnancy category C.   There are no adequate and well controlled trials with olanzapine in pregnant females.  Olanzapine should be used during pregnancy only if the potential benefit justifies the potential risk to the fetus.   In a study in lactating healthy women, olanzapine was excreted in breast milk.  It is recommended that women taking olanzapine should not breast feed.
Zoryve Counseling:  I discussed with the patient that Zoryve is not for use in the eyes, mouth or vagina. The most commonly reported side effects include diarrhea, headache, insomnia, application site pain, upper respiratory tract infections, and urinary tract infections.  All of the patient's questions and concerns were addressed.
Calcipotriene Pregnancy And Lactation Text: The use of this medication during pregnancy or lactation is not recommended as there is insufficient data.
Cimzia Counseling:  I discussed with the patient the risks of Cimzia including but not limited to immunosuppression, allergic reactions and infections.  The patient understands that monitoring is required including a PPD at baseline and must alert us or the primary physician if symptoms of infection or other concerning signs are noted.
SSKI Counseling:  I discussed with the patient the risks of SSKI including but not limited to thyroid abnormalities, metallic taste, GI upset, fever, headache, acne, arthralgias, paraesthesias, lymphadenopathy, easy bleeding, arrhythmias, and allergic reaction.
Spironolactone Pregnancy And Lactation Text: This medication can cause feminization of the male fetus and should be avoided during pregnancy. The active metabolite is also found in breast milk.
Rinvoq Pregnancy And Lactation Text: Based on animal studies, Rinvoq may cause embryo-fetal harm when administered to pregnant women.  The medication should not be used in pregnancy.  Breastfeeding is not recommended during treatment and for 6 days after the last dose.
Itraconazole Pregnancy And Lactation Text: This medication is Pregnancy Category C and it isn't know if it is safe during pregnancy. It is also excreted in breast milk.
Hydroquinone Counseling:  Patient advised that medication may result in skin irritation, lightening (hypopigmentation), dryness, and burning.  In the event of skin irritation, the patient was advised to reduce the amount of the drug applied or use it less frequently.  Rarely, spots that are treated with hydroquinone can become darker (pseudoochronosis).  Should this occur, patient instructed to stop medication and call the office. The patient verbalized understanding of the proper use and possible adverse effects of hydroquinone.  All of the patient's questions and concerns were addressed.
Topical Clindamycin Counseling: Patient counseled that this medication may cause skin irritation or allergic reactions.  In the event of skin irritation, the patient was advised to reduce the amount of the drug applied or use it less frequently.   The patient verbalized understanding of the proper use and possible adverse effects of clindamycin.  All of the patient's questions and concerns were addressed.
Cantharidin Counseling:  I discussed with the patient the risks of Cantharidin including but not limited to pain, redness, burning, itching, and blistering.
Erythromycin Pregnancy And Lactation Text: This medication is Pregnancy Category B and is considered safe during pregnancy. It is also excreted in breast milk.
Hydroxyzine Counseling: Patient advised that the medication is sedating and not to drive a car after taking this medication.  Patient informed of potential adverse effects including but not limited to dry mouth, urinary retention, and blurry vision.  The patient verbalized understanding of the proper use and possible adverse effects of hydroxyzine.  All of the patient's questions and concerns were addressed.
Bactrim Counseling:  I discussed with the patient the risks of sulfa antibiotics including but not limited to GI upset, allergic reaction, drug rash, diarrhea, dizziness, photosensitivity, and yeast infections.  Rarely, more serious reactions can occur including but not limited to aplastic anemia, agranulocytosis, methemoglobinemia, blood dyscrasias, liver or kidney failure, lung infiltrates or desquamative/blistering drug rashes.
Clofazimine Pregnancy And Lactation Text: This medication is Pregnancy Category C and isn't considered safe during pregnancy. It is excreted in breast milk.
Hydroxychloroquine Counseling:  I discussed with the patient that a baseline ophthalmologic exam is needed at the start of therapy and every year thereafter while on therapy. A CBC may also be warranted for monitoring.  The side effects of this medication were discussed with the patient, including but not limited to agranulocytosis, aplastic anemia, seizures, rashes, retinopathy, and liver toxicity. Patient instructed to call the office should any adverse effect occur.  The patient verbalized understanding of the proper use and possible adverse effects of Plaquenil.  All the patient's questions and concerns were addressed.
Tremfya Counseling: I discussed with the patient the risks of guselkumab including but not limited to immunosuppression, serious infections, worsening of inflammatory bowel disease and drug reactions.  The patient understands that monitoring is required including a PPD at baseline and must alert us or the primary physician if symptoms of infection or other concerning signs are noted.
Aklief counseling:  Patient advised to apply a pea-sized amount only at bedtime and wait 30 minutes after washing their face before applying.  If too drying, patient may add a non-comedogenic moisturizer.  The most commonly reported side effects including irritation, redness, scaling, dryness, stinging, burning, itching, and increased risk of sunburn.  The patient verbalized understanding of the proper use and possible adverse effects of retinoids.  All of the patient's questions and concerns were addressed.
Opzelura Counseling:  I discussed with the patient the risks of Opzelura including but not limited to nasopharngitis, bronchitis, ear infection, eosinophila, hives, diarrhea, folliculitis, tonsillitis, and rhinorrhea.  Taken orally, this medication has been linked to serious infections; higher rate of mortality; malignancy and lymphoproliferative disorders; major adverse cardiovascular events; thrombosis; thrombocytopenia, anemia, and neutropenia; and lipid elevations.
Methotrexate Pregnancy And Lactation Text: This medication is Pregnancy Category X and is known to cause fetal harm. This medication is excreted in breast milk.
Wartpeel Counseling:  I discussed with the patient the risks of Wartpeel including but not limited to erythema, scaling, itching, weeping, crusting, and pain.
Oral Minoxidil Counseling- I discussed with the patient the risks of oral minoxidil including but not limited to shortness of breath, swelling of the feet or ankles, dizziness, lightheadedness, unwanted hair growth and allergic reaction.  The patient verbalized understanding of the proper use and possible adverse effects of oral minoxidil.  All of the patient's questions and concerns were addressed.
Sski Pregnancy And Lactation Text: This medication is Pregnancy Category D and isn't considered safe during pregnancy. It is excreted in breast milk.
Sarecycline Pregnancy And Lactation Text: This medication is Pregnancy Category D and not consider safe during pregnancy. It is also excreted in breast milk.
Cantharidin Pregnancy And Lactation Text: This medication has not been proven safe during pregnancy. It is unknown if this medication is excreted in breast milk.
Cimzia Pregnancy And Lactation Text: This medication crosses the placenta but can be considered safe in certain situations. Cimzia may be excreted in breast milk.
Metronidazole Counseling:  I discussed with the patient the risks of metronidazole including but not limited to seizures, nausea/vomiting, a metallic taste in the mouth, nausea/vomiting and severe allergy.
Ketoconazole Counseling:   Patient counseled regarding improving absorption with orange juice.  Adverse effects include but are not limited to breast enlargement, headache, diarrhea, nausea, upset stomach, liver function test abnormalities, taste disturbance, and stomach pain.  There is a rare possibility of liver failure that can occur when taking ketoconazole. The patient understands that monitoring of LFTs may be required, especially at baseline. The patient verbalized understanding of the proper use and possible adverse effects of ketoconazole.  All of the patient's questions and concerns were addressed.
Topical Clindamycin Pregnancy And Lactation Text: This medication is Pregnancy Category B and is considered safe during pregnancy. It is unknown if it is excreted in breast milk.
5-Fu Counseling: 5-Fluorouracil Counseling:  I discussed with the patient the risks of 5-fluorouracil including but not limited to erythema, scaling, itching, weeping, crusting, and pain.
Sotyktu Counseling:  I discussed the most common side effects of Sotyktu including: common cold, sore throat, sinus infections, cold sores, canker sores, folliculitis, and acne.? I also discussed more serious side effects of Sotyktu including but not limited to: serious allergic reactions; increased risk for infections such as TB; cancers such as lymphomas; rhabdomyolysis and elevated CPK; and elevated triglycerides and liver enzymes.?
Solaraze Counseling:  I discussed with the patient the risks of Solaraze including but not limited to erythema, scaling, itching, weeping, crusting, and pain.
Bactrim Pregnancy And Lactation Text: This medication is Pregnancy Category D and is known to cause fetal risk.  It is also excreted in breast milk.
Azathioprine Counseling:  I discussed with the patient the risks of azathioprine including but not limited to myelosuppression, immunosuppression, hepatotoxicity, lymphoma, and infections.  The patient understands that monitoring is required including baseline LFTs, Creatinine, possible TPMP genotyping and weekly CBCs for the first month and then every 2 weeks thereafter.  The patient verbalized understanding of the proper use and possible adverse effects of azathioprine.  All of the patient's questions and concerns were addressed.
Hydroxychloroquine Pregnancy And Lactation Text: This medication has been shown to cause fetal harm but it isn't assigned a Pregnancy Risk Category. There are small amounts excreted in breast milk.
Hydroxyzine Pregnancy And Lactation Text: This medication is not safe during pregnancy and should not be taken. It is also excreted in breast milk and breast feeding isn't recommended.
Thalidomide Counseling: I discussed with the patient the risks of thalidomide including but not limited to birth defects, anxiety, weakness, chest pain, dizziness, cough and severe allergy.
Colchicine Counseling:  Patient counseled regarding adverse effects including but not limited to stomach upset (nausea, vomiting, stomach pain, or diarrhea).  Patient instructed to limit alcohol consumption while taking this medication.  Colchicine may reduce blood counts especially with prolonged use.  The patient understands that monitoring of kidney function and blood counts may be required, especially at baseline. The patient verbalized understanding of the proper use and possible adverse effects of colchicine.  All of the patient's questions and concerns were addressed.
Aklief Pregnancy And Lactation Text: It is unknown if this medication is safe to use during pregnancy.  It is unknown if this medication is excreted in breast milk.  Breastfeeding women should use the topical cream on the smallest area of the skin for the shortest time needed while breastfeeding.  Do not apply to nipple and areola.
Opzelura Pregnancy And Lactation Text: There is insufficient data to evaluate drug-associated risk for major birth defects, miscarriage, or other adverse maternal or fetal outcomes.  There is a pregnancy registry that monitors pregnancy outcomes in pregnant persons exposed to the medication during pregnancy.  It is unknown if this medication is excreted in breast milk.  Do not breastfeed during treatment and for about 4 weeks after the last dose.
Imiquimod Counseling:  I discussed with the patient the risks of imiquimod including but not limited to erythema, scaling, itching, weeping, crusting, and pain.  Patient understands that the inflammatory response to imiquimod is variable from person to person and was educated regarded proper titration schedule.  If flu-like symptoms develop, patient knows to discontinue the medication and contact us.
Ketoconazole Pregnancy And Lactation Text: This medication is Pregnancy Category C and it isn't know if it is safe during pregnancy. It is also excreted in breast milk and breast feeding isn't recommended.
Simponi Counseling:  I discussed with the patient the risks of golimumab including but not limited to myelosuppression, immunosuppression, autoimmune hepatitis, demyelinating diseases, lymphoma, and serious infections.  The patient understands that monitoring is required including a PPD at baseline and must alert us or the primary physician if symptoms of infection or other concerning signs are noted.
Tetracycline Counseling: Patient counseled regarding possible photosensitivity and increased risk for sunburn.  Patient instructed to avoid sunlight, if possible.  When exposed to sunlight, patients should wear protective clothing, sunglasses, and sunscreen.  The patient was instructed to call the office immediately if the following severe adverse effects occur:  hearing changes, easy bruising/bleeding, severe headache, or vision changes.  The patient verbalized understanding of the proper use and possible adverse effects of tetracycline.  All of the patient's questions and concerns were addressed. Patient understands to avoid pregnancy while on therapy due to potential birth defects.
Oral Minoxidil Pregnancy And Lactation Text: This medication should only be used when clearly needed if you are pregnant, attempting to become pregnant or breast feeding.
Prednisone Counseling:  I discussed with the patient the risks of prolonged use of prednisone including but not limited to weight gain, insomnia, osteoporosis, mood changes, diabetes, susceptibility to infection, glaucoma and high blood pressure.  In cases where prednisone use is prolonged, patients should be monitored with blood pressure checks, serum glucose levels and an eye exam.  Additionally, the patient may need to be placed on GI prophylaxis, PCP prophylaxis, and calcium and vitamin D supplementation and/or a bisphosphonate.  The patient verbalized understanding of the proper use and the possible adverse effects of prednisone.  All of the patient's questions and concerns were addressed.
Zyclara Counseling:  I discussed with the patient the risks of imiquimod including but not limited to erythema, scaling, itching, weeping, crusting, and pain.  Patient understands that the inflammatory response to imiquimod is variable from person to person and was educated regarded proper titration schedule.  If flu-like symptoms develop, patient knows to discontinue the medication and contact us.
Cosentyx Counseling:  I discussed with the patient the risks of Cosentyx including but not limited to worsening of Crohn's disease, immunosuppression, allergic reactions and infections.  The patient understands that monitoring is required including a PPD at baseline and must alert us or the primary physician if symptoms of infection or other concerning signs are noted.
Azathioprine Pregnancy And Lactation Text: This medication is Pregnancy Category D and isn't considered safe during pregnancy. It is unknown if this medication is excreted in breast milk.
Metronidazole Pregnancy And Lactation Text: This medication is Pregnancy Category B and considered safe during pregnancy.  It is also excreted in breast milk.
Low Dose Naltrexone Counseling- I discussed with the patient the potential risks and side effects of low dose naltrexone including but not limited to: more vivid dreams, headaches, nausea, vomiting, abdominal pain, fatigue, dizziness, and anxiety.
Cephalexin Counseling: I counseled the patient regarding use of cephalexin as an antibiotic for prophylactic and/or therapeutic purposes. Cephalexin (commonly prescribed under brand name Keflex) is a cephalosporin antibiotic which is active against numerous classes of bacteria, including most skin bacteria. Side effects may include nausea, diarrhea, gastrointestinal upset, rash, hives, yeast infections, and in rare cases, hepatitis, kidney disease, seizures, fever, confusion, neurologic symptoms, and others. Patients with severe allergies to penicillin medications are cautioned that there is about a 10% incidence of cross-reactivity with cephalosporins. When possible, patients with penicillin allergies should use alternatives to cephalosporins for antibiotic therapy.
Sotyktu Pregnancy And Lactation Text: There is insufficient data to evaluate whether or not Sotyktu is safe to use during pregnancy.? ?It is not known if Sotyktu passes into breast milk and whether or not it is safe to use when breastfeeding.??
Dutasteride Male Counseling: Dustasteride Counseling:  I discussed with the patient the risks of use of dutasteride including but not limited to decreased libido, decreased ejaculate volume, and gynecomastia. Women who can become pregnant should not handle medication.  All of the patient's questions and concerns were addressed.
Topical Ketoconazole Counseling: Patient counseled that this medication may cause skin irritation or allergic reactions.  In the event of skin irritation, the patient was advised to reduce the amount of the drug applied or use it less frequently.   The patient verbalized understanding of the proper use and possible adverse effects of ketoconazole.  All of the patient's questions and concerns were addressed.
Solaraze Pregnancy And Lactation Text: This medication is Pregnancy Category B and is considered safe. There is some data to suggest avoiding during the third trimester. It is unknown if this medication is excreted in breast milk.
Xolair Counseling:  Patient informed of potential adverse effects including but not limited to fever, muscle aches, rash and allergic reactions.  The patient verbalized understanding of the proper use and possible adverse effects of Xolair.  All of the patient's questions and concerns were addressed.
Azelaic Acid Counseling: Patient counseled that medicine may cause skin irritation and to avoid applying near the eyes.  In the event of skin irritation, the patient was advised to reduce the amount of the drug applied or use it less frequently.   The patient verbalized understanding of the proper use and possible adverse effects of azelaic acid.  All of the patient's questions and concerns were addressed.
Zyclara Pregnancy And Lactation Text: This medication is Pregnancy Category C. It is unknown if this medication is excreted in breast milk.
Albendazole Counseling:  I discussed with the patient the risks of albendazole including but not limited to cytopenia, kidney damage, nausea/vomiting and severe allergy.  The patient understands that this medication is being used in an off-label manner.
Winlevi Counseling:  I discussed with the patient the risks of topical clascoterone including but not limited to erythema, scaling, itching, and stinging. Patient voiced their understanding.
Picato Counseling:  I discussed with the patient the risks of Picato including but not limited to erythema, scaling, itching, weeping, crusting, and pain.
Xeljanz Counseling: I discussed with the patient the risks of Xeljanz therapy including increased risk of infection, liver issues, headache, diarrhea, or cold symptoms. Live vaccines should be avoided. They were instructed to call if they have any problems.
Terbinafine Counseling: Patient counseling regarding adverse effects of terbinafine including but not limited to headache, diarrhea, rash, upset stomach, liver function test abnormalities, itching, taste/smell disturbance, nausea, abdominal pain, and flatulence.  There is a rare possibility of liver failure that can occur when taking terbinafine.  The patient understands that a baseline LFT and kidney function test may be required. The patient verbalized understanding of the proper use and possible adverse effects of terbinafine.  All of the patient's questions and concerns were addressed.
Dutasteride Pregnancy And Lactation Text: This medication is absolutely contraindicated in women, especially during pregnancy and breast feeding. Feminization of male fetuses is possible if taking while pregnant.
Low Dose Naltrexone Pregnancy And Lactation Text: Naltrexone is pregnancy category C.  There have been no adequate and well-controlled studies in pregnant women.  It should be used in pregnancy only if the potential benefit justifies the potential risk to the fetus.   Limited data indicates that naltrexone is minimally excreted into breastmilk.
Ilumya Counseling: I discussed with the patient the risks of tildrakizumab including but not limited to immunosuppression, malignancy, posterior leukoencephalopathy syndrome, and serious infections.  The patient understands that monitoring is required including a PPD at baseline and must alert us or the primary physician if symptoms of infection or other concerning signs are noted.
Minocycline Counseling: Patient advised regarding possible photosensitivity and discoloration of the teeth, skin, lips, tongue and gums.  Patient instructed to avoid sunlight, if possible.  When exposed to sunlight, patients should wear protective clothing, sunglasses, and sunscreen.  The patient was instructed to call the office immediately if the following severe adverse effects occur:  hearing changes, easy bruising/bleeding, severe headache, or vision changes.  The patient verbalized understanding of the proper use and possible adverse effects of minocycline.  All of the patient's questions and concerns were addressed.
Otezla Counseling: The side effects of Otezla were discussed with the patient, including but not limited to worsening or new depression, weight loss, diarrhea, nausea, upper respiratory tract infection, and headache. Patient instructed to call the office should any adverse effect occur.  The patient verbalized understanding of the proper use and possible adverse effects of Otezla.  All the patient's questions and concerns were addressed.
Soolantra Counseling: I discussed with the patients the risks of topial Soolantra. This is a medicine which decreases the number of mites and inflammation in the skin. You experience burning, stinging, eye irritation or allergic reactions.  Please call our office if you develop any problems from using this medication.
Detail Level: Detailed
Cellcept Counseling:  I discussed with the patient the risks of mycophenolate mofetil including but not limited to infection/immunosuppression, GI upset, hypokalemia, hypercholesterolemia, bone marrow suppression, lymphoproliferative disorders, malignancy, GI ulceration/bleed/perforation, colitis, interstitial lung disease, kidney failure, progressive multifocal leukoencephalopathy, and birth defects.  The patient understands that monitoring is required including a baseline creatinine and regular CBC testing. In addition, patient must alert us immediately if symptoms of infection or other concerning signs are noted.
Xolair Pregnancy And Lactation Text: This medication is Pregnancy Category B and is considered safe during pregnancy. This medication is excreted in breast milk.
Drysol Counseling:  I discussed with the patient the risks of drysol/aluminum chloride including but not limited to skin rash, itching, irritation, burning.
Skyrizi Counseling: I discussed with the patient the risks of risankizumab-rzaa including but not limited to immunosuppression, and serious infections.  The patient understands that monitoring is required including a PPD at baseline and must alert us or the primary physician if symptoms of infection or other concerning signs are noted.
Cephalexin Pregnancy And Lactation Text: This medication is Pregnancy Category B and considered safe during pregnancy.  It is also excreted in breast milk but can be used safely for shorter doses.
Albendazole Pregnancy And Lactation Text: This medication is Pregnancy Category C and it isn't known if it is safe during pregnancy. It is also excreted in breast milk.
Tranexamic Acid Counseling:  Patient advised of the small risk of bleeding problems with tranexamic acid. They were also instructed to call if they developed any nausea, vomiting or diarrhea. All of the patient's questions and concerns were addressed.
Dapsone Counseling: I discussed with the patient the risks of dapsone including but not limited to hemolytic anemia, agranulocytosis, rashes, methemoglobinemia, kidney failure, peripheral neuropathy, headaches, GI upset, and liver toxicity.  Patients who start dapsone require monitoring including baseline LFTs and weekly CBCs for the first month, then every month thereafter.  The patient verbalized understanding of the proper use and possible adverse effects of dapsone.  All of the patient's questions and concerns were addressed.
Otezla Pregnancy And Lactation Text: This medication is Pregnancy Category C and it isn't known if it is safe during pregnancy. It is unknown if it is excreted in breast milk.
Xelabramz Pregnancy And Lactation Text: This medication is Pregnancy Category D and is not considered safe during pregnancy.  The risk during breast feeding is also uncertain.
Azelaic Acid Pregnancy And Lactation Text: This medication is considered safe during pregnancy and breast feeding.
Dupixent Counseling: I discussed with the patient the risks of dupilumab including but not limited to eye infection and irritation, cold sores, injection site reactions, worsening of asthma, allergic reactions and increased risk of parasitic infection.  Live vaccines should be avoided while taking dupilumab. Dupilumab will also interact with certain medications such as warfarin and cyclosporine. The patient understands that monitoring is required and they must alert us or the primary physician if symptoms of infection or other concerning signs are noted.
Terbinafine Pregnancy And Lactation Text: This medication is Pregnancy Category B and is considered safe during pregnancy. It is also excreted in breast milk and breast feeding isn't recommended.
Klisyri Counseling:  I discussed with the patient the risks of Klisyri including but not limited to erythema, scaling, itching, weeping, crusting, and pain.
Opioid Counseling: I discussed with the patient the potential side effects of opioids including but not limited to addiction, altered mental status, and depression. I stressed avoiding alcohol, benzodiazepines, muscle relaxants and sleep aids unless specifically okayed by a physician. The patient verbalized understanding of the proper use and possible adverse effects of opioids. All of the patient's questions and concerns were addressed. They were instructed to flush the remaining pills down the toilet if they did not need them for pain.
Topical Metronidazole Counseling: Metronidazole is a topical antibiotic medication. You may experience burning, stinging, redness, or allergic reactions.  Please call our office if you develop any problems from using this medication.
Finasteride Male Counseling: Finasteride Counseling:  I discussed with the patient the risks of use of finasteride including but not limited to decreased libido, decreased ejaculate volume, gynecomastia, and depression. Women should not handle medication.  All of the patient's questions and concerns were addressed.
Fluconazole Counseling:  Patient counseled regarding adverse effects of fluconazole including but not limited to headache, diarrhea, nausea, upset stomach, liver function test abnormalities, taste disturbance, and stomach pain.  There is a rare possibility of liver failure that can occur when taking fluconazole.  The patient understands that monitoring of LFTs and kidney function test may be required, especially at baseline. The patient verbalized understanding of the proper use and possible adverse effects of fluconazole.  All of the patient's questions and concerns were addressed.
Niacinamide Counseling: I recommended taking niacin or niacinamide, also know as vitamin B3, twice daily. Recent evidence suggests that taking vitamin B3 (500 mg twice daily) can reduce the risk of actinic keratoses and non-melanoma skin cancers. Side effects of vitamin B3 include flushing and headache.
Clindamycin Counseling: I counseled the patient regarding use of clindamycin as an antibiotic for prophylactic and/or therapeutic purposes. Clindamycin is active against numerous classes of bacteria, including skin bacteria. Side effects may include nausea, diarrhea, gastrointestinal upset, rash, hives, yeast infections, and in rare cases, colitis.
Dapsone Pregnancy And Lactation Text: This medication is Pregnancy Category C and is not considered safe during pregnancy or breast feeding.
Tranexamic Acid Pregnancy And Lactation Text: It is unknown if this medication is safe during pregnancy or breast feeding.
Soolantra Pregnancy And Lactation Text: This medication is Pregnancy Category C. This medication is considered safe during breast feeding.
Cibinqo Counseling: I discussed with the patient the risks of Cibinqo therapy including but not limited to common cold, nausea, headache, cold sores, increased blood CPK levels, dizziness, UTIs, fatigue, acne, and vomitting. Live vaccines should be avoided.  This medication has been linked to serious infections; higher rate of mortality; malignancy and lymphoproliferative disorders; major adverse cardiovascular events; thrombosis; thrombocytopenia and lymphopenia; lipid elevations; and retinal detachment.
Benzoyl Peroxide Counseling: Patient counseled that medicine may cause skin irritation and bleach clothing.  In the event of skin irritation, the patient was advised to reduce the amount of the drug applied or use it less frequently.   The patient verbalized understanding of the proper use and possible adverse effects of benzoyl peroxide.  All of the patient's questions and concerns were addressed.
Ivermectin Counseling:  Patient instructed to take medication on an empty stomach with a full glass of water.  Patient informed of potential adverse effects including but not limited to nausea, diarrhea, dizziness, itching, and swelling of the extremities or lymph nodes.  The patient verbalized understanding of the proper use and possible adverse effects of ivermectin.  All of the patient's questions and concerns were addressed.
Protopic Counseling: Patient may experience a mild burning sensation during topical application. Protopic is not approved in children less than 2 years of age. There have been case reports of hematologic and skin malignancies in patients using topical calcineurin inhibitors although causality is questionable.
Oxybutynin Counseling:  I discussed with the patient the risks of oxybutynin including but not limited to skin rash, drowsiness, dry mouth, difficulty urinating, and blurred vision.
Topical Metronidazole Pregnancy And Lactation Text: This medication is Pregnancy Category B and considered safe during pregnancy.  It is also considered safe to use while breastfeeding.
Niacinamide Pregnancy And Lactation Text: These medications are considered safe during pregnancy.
Infliximab Counseling:  I discussed with the patient the risks of infliximab including but not limited to myelosuppression, immunosuppression, autoimmune hepatitis, demyelinating diseases, lymphoma, and serious infections.  The patient understands that monitoring is required including a PPD at baseline and must alert us or the primary physician if symptoms of infection or other concerning signs are noted.
Erivedge Counseling- I discussed with the patient the risks of Erivedge including but not limited to nausea, vomiting, diarrhea, constipation, weight loss, changes in the sense of taste, decreased appetite, muscle spasms, and hair loss.  The patient verbalized understanding of the proper use and possible adverse effects of Erivedge.  All of the patient's questions and concerns were addressed.
Dupixent Pregnancy And Lactation Text: This medication likely crosses the placenta but the risk for the fetus is uncertain. This medication is excreted in breast milk.
Klisyri Pregnancy And Lactation Text: It is unknown if this medication can harm a developing fetus or if it is excreted in breast milk.
Finasteride Pregnancy And Lactation Text: This medication is absolutely contraindicated during pregnancy. It is unknown if it is excreted in breast milk.
Cibinqo Pregnancy And Lactation Text: It is unknown if this medication will adversely affect pregnancy or breast feeding.  You should not take this medication if you are currently pregnant or planning a pregnancy or while breastfeeding.
Opioid Pregnancy And Lactation Text: These medications can lead to premature delivery and should be avoided during pregnancy. These medications are also present in breast milk in small amounts.
Valtrex Counseling: I discussed with the patient the risks of valacyclovir including but not limited to kidney damage, nausea, vomiting and severe allergy.  The patient understands that if the infection seems to be worsening or is not improving, they are to call.
Elidel Counseling: Patient may experience a mild burning sensation during topical application. Elidel is not approved in children less than 2 years of age. There have been case reports of hematologic and skin malignancies in patients using topical calcineurin inhibitors although causality is questionable.
Quinolones Counseling:  I discussed with the patient the risks of fluoroquinolones including but not limited to GI upset, allergic reaction, drug rash, diarrhea, dizziness, photosensitivity, yeast infections, liver function test abnormalities, tendonitis/tendon rupture.
Topical Retinoid counseling:  Patient advised to apply a pea-sized amount only at bedtime and wait 30 minutes after washing their face before applying.  If too drying, patient may add a non-comedogenic moisturizer. The patient verbalized understanding of the proper use and possible adverse effects of retinoids.  All of the patient's questions and concerns were addressed.
Cyclophosphamide Counseling:  I discussed with the patient the risks of cyclophosphamide including but not limited to hair loss, hormonal abnormalities, decreased fertility, abdominal pain, diarrhea, nausea and vomiting, bone marrow suppression and infection. The patient understands that monitoring is required while taking this medication.
Clindamycin Pregnancy And Lactation Text: This medication can be used in pregnancy if certain situations. Clindamycin is also present in breast milk.
Acitretin Counseling:  I discussed with the patient the risks of acitretin including but not limited to hair loss, dry lips/skin/eyes, liver damage, hyperlipidemia, depression/suicidal ideation, photosensitivity.  Serious rare side effects can include but are not limited to pancreatitis, pseudotumor cerebri, bony changes, clot formation/stroke/heart attack.  Patient understands that alcohol is contraindicated since it can result in liver toxicity and significantly prolong the elimination of the drug by many years.
Cimetidine Counseling:  I discussed with the patient the risks of Cimetidine including but not limited to gynecomastia, headache, diarrhea, nausea, drowsiness, arrhythmias, pancreatitis, skin rashes, psychosis, bone marrow suppression and kidney toxicity.
Gabapentin Counseling: I discussed with the patient the risks of gabapentin including but not limited to dizziness, somnolence, fatigue and ataxia.
Stelara Counseling:  I discussed with the patient the risks of ustekinumab including but not limited to immunosuppression, malignancy, posterior leukoencephalopathy syndrome, and serious infections.  The patient understands that monitoring is required including a PPD at baseline and must alert us or the primary physician if symptoms of infection or other concerning signs are noted.
Protopic Pregnancy And Lactation Text: This medication is Pregnancy Category C. It is unknown if this medication is excreted in breast milk when applied topically.
Benzoyl Peroxide Pregnancy And Lactation Text: This medication is Pregnancy Category C. It is unknown if benzoyl peroxide is excreted in breast milk.
Enbrel Counseling:  I discussed with the patient the risks of etanercept including but not limited to myelosuppression, immunosuppression, autoimmune hepatitis, demyelinating diseases, lymphoma, and infections.  The patient understands that monitoring is required including a PPD at baseline and must alert us or the primary physician if symptoms of infection or other concerning signs are noted.
Birth Control Pills Counseling: Birth Control Pill Counseling: I discussed with the patient the potential side effects of OCPs including but not limited to increased risk of stroke, heart attack, thrombophlebitis, deep venous thrombosis, hepatic adenomas, breast changes, GI upset, headaches, and depression.  The patient verbalized understanding of the proper use and possible adverse effects of OCPs. All of the patient's questions and concerns were addressed.
Minoxidil Counseling: Minoxidil is a topical medication which can increase blood flow where it is applied. It is uncertain how this medication increases hair growth. Side effects are uncommon and include stinging and allergic reactions.
Topical Steroids Counseling: I discussed with the patient that prolonged use of topical steroids can result in the increased appearance of superficial blood vessels (telangiectasias), lightening (hypopigmentation) and thinning of the skin (atrophy).  Patient understands to avoid using high potency steroids in skin folds, the groin or the face.  The patient verbalized understanding of the proper use and possible adverse effects of topical steroids.  All of the patient's questions and concerns were addressed.
Cyclophosphamide Pregnancy And Lactation Text: This medication is Pregnancy Category D and it isn't considered safe during pregnancy. This medication is excreted in breast milk.
Nsaids Counseling: NSAID Counseling: I discussed with the patient that NSAIDs should be taken with food. Prolonged use of NSAIDs can result in the development of stomach ulcers.  Patient advised to stop taking NSAIDs if abdominal pain occurs.  The patient verbalized understanding of the proper use and possible adverse effects of NSAIDs.  All of the patient's questions and concerns were addressed.
Olumiant Counseling: I discussed with the patient the risks of Olumiant therapy including but not limited to upper respiratory tract infections, shingles, cold sores, and nausea. Live vaccines should be avoided.  This medication has been linked to serious infections; higher rate of mortality; malignancy and lymphoproliferative disorders; major adverse cardiovascular events; thrombosis; gastrointestinal perforations; neutropenia; lymphopenia; anemia; liver enzyme elevations; and lipid elevations.
Winlevi Pregnancy And Lactation Text: This medication is considered safe during pregnancy and breastfeeding.
Doxycycline Counseling:  Patient counseled regarding possible photosensitivity and increased risk for sunburn.  Patient instructed to avoid sunlight, if possible.  When exposed to sunlight, patients should wear protective clothing, sunglasses, and sunscreen.  The patient was instructed to call the office immediately if the following severe adverse effects occur:  hearing changes, easy bruising/bleeding, severe headache, or vision changes.  The patient verbalized understanding of the proper use and possible adverse effects of doxycycline.  All of the patient's questions and concerns were addressed.
Griseofulvin Counseling:  I discussed with the patient the risks of griseofulvin including but not limited to photosensitivity, cytopenia, liver damage, nausea/vomiting and severe allergy.  The patient understands that this medication is best absorbed when taken with a fatty meal (e.g., ice cream or french fries).
Valtrex Pregnancy And Lactation Text: this medication is Pregnancy Category B and is considered safe during pregnancy. This medication is not directly found in breast milk but it's metabolite acyclovir is present.
Qbrexza Counseling:  I discussed with the patient the risks of Qbrexza including but not limited to headache, mydriasis, blurred vision, dry eyes, nasal dryness, dry mouth, dry throat, dry skin, urinary hesitation, and constipation.  Local skin reactions including erythema, burning, stinging, and itching can also occur.
Carac Counseling:  I discussed with the patient the risks of Carac including but not limited to erythema, scaling, itching, weeping, crusting, and pain.

## 2023-05-01 NOTE — PROCEDURE: TREATMENT REGIMEN
Initiate Treatment: Triamcinlone 0.1% cream BID when flared\\ncompression stockings\\n(restart)
Detail Level: Zone

## 2023-05-01 NOTE — PROCEDURE: ORDER FOR PHOTODYNAMIC THERAPY
Hands Incubation Time: 2 Hours
Pdt Type: RAUL-U
Neck Incubation Time: 1 Hour
Face And Scalp Incubation Time: 1 Hour for the face and 2 Hours for the scalp
Frequency Of Pdt: Single Treatment
Debridement: No
Location: Face
Detail Level: Zone
Photosensitizer: Levulan
Face Incubation Time: 1.5 Hour
Consent: Written consent was obtained today.  The procedure and risks were reviewed with the patient including but not limited to: burning, pigmentary changes, pain, blistering, scabbing, redness, and the possibility of needing numerous treatments. Strict photoprotection after the procedure was also discussed.

## 2023-05-04 ENCOUNTER — OFFICE VISIT (OUTPATIENT)
Dept: CARDIOLOGY | Facility: MEDICAL CENTER | Age: 73
End: 2023-05-04
Payer: MEDICARE

## 2023-05-04 VITALS
RESPIRATION RATE: 16 BRPM | HEART RATE: 80 BPM | DIASTOLIC BLOOD PRESSURE: 76 MMHG | HEIGHT: 75 IN | OXYGEN SATURATION: 95 % | WEIGHT: 230 LBS | SYSTOLIC BLOOD PRESSURE: 130 MMHG | BODY MASS INDEX: 28.6 KG/M2

## 2023-05-04 DIAGNOSIS — R93.1 ELEVATED CORONARY ARTERY CALCIUM SCORE: ICD-10-CM

## 2023-05-04 DIAGNOSIS — I10 PRIMARY HYPERTENSION: ICD-10-CM

## 2023-05-04 DIAGNOSIS — E11.42 DIABETIC POLYNEUROPATHY ASSOCIATED WITH TYPE 2 DIABETES MELLITUS (HCC): ICD-10-CM

## 2023-05-04 DIAGNOSIS — E78.5 DYSLIPIDEMIA: ICD-10-CM

## 2023-05-04 PROCEDURE — 99213 OFFICE O/P EST LOW 20 MIN: CPT | Performed by: INTERNAL MEDICINE

## 2023-05-04 RX ORDER — IRBESARTAN 300 MG/1
300 TABLET ORAL DAILY
Qty: 100 TABLET | Refills: 3 | Status: SHIPPED | OUTPATIENT
Start: 2023-05-04 | End: 2023-12-13 | Stop reason: SDUPTHER

## 2023-05-04 RX ORDER — TRIAMCINOLONE ACETONIDE 1 MG/G
CREAM TOPICAL
COMMUNITY
Start: 2023-05-01

## 2023-05-04 RX ORDER — ATORVASTATIN CALCIUM 80 MG/1
80 TABLET, FILM COATED ORAL NIGHTLY
Qty: 100 TABLET | Refills: 3 | Status: SHIPPED | OUTPATIENT
Start: 2023-05-04 | End: 2023-12-13 | Stop reason: SDUPTHER

## 2023-05-04 RX ORDER — HYDROCHLOROTHIAZIDE 25 MG/1
25 TABLET ORAL DAILY
Qty: 100 TABLET | Refills: 3 | Status: SHIPPED | OUTPATIENT
Start: 2023-05-04 | End: 2023-12-13 | Stop reason: SDUPTHER

## 2023-05-04 RX ORDER — EZETIMIBE 10 MG/1
10 TABLET ORAL DAILY
Qty: 100 TABLET | Refills: 3 | Status: SHIPPED | OUTPATIENT
Start: 2023-05-04 | End: 2024-03-04

## 2023-05-04 RX ORDER — CARVEDILOL 6.25 MG/1
6.25 TABLET ORAL 2 TIMES DAILY WITH MEALS
Qty: 200 TABLET | Refills: 3 | Status: SHIPPED | OUTPATIENT
Start: 2023-05-04 | End: 2023-12-13 | Stop reason: SDUPTHER

## 2023-05-04 NOTE — PROGRESS NOTES
Cardiology Follow-up Consultation Note    Date of note:    5/4/2023  Primary Care Provider: Garfield Kraft D.O.  Referring Provider: Self    Patient Name: Dejan Mooney   YOB: 1950  MRN:              [id number]    Chief Complaint: hypertension    History of Present Illness: Dejan Mooney is a 72 y.o. male whose current medical problems include insulin dependent diabetes, hypertension, and non-obstructive disease on previous cath who is here for cardiac consultation for hypertension.    At our initial visit, 2/22/2022:  Has chronic BPPV, mild symptoms now.     Some mild leg swelling. Exercising around 15 minutes a day, asymptomatic.     BP in the 130s-140s/70s.     Last hgbA1c 7.6    Interim Events:  Patient denies chest pain, palpitations, dyspnea on exertion, pre-syncope, syncope, lower extremity swelling, orthopnea, PND or recent weight gain.       + neuropathy    Starting to bike no symptoms.     Review of Systems   Skin:  Positive for rash.   Allergic/Immunologic: Positive for environmental allergies.     Constitution: Negative for chills, fever and night sweats.   HENT: Negative for nosebleeds.    Eyes: Negative for vision loss in left eye and vision loss in right eye.   Respiratory: Negative for hemoptysis.    Gastrointestinal: Negative for hematemesis, hematochezia and melena.   Genitourinary: Negative for hematuria.   Neurological: Negative for focal weakness, numbness and paresthesias.     All other systems reviewed and discussed using a comprehensive questionnaire and are negative.         Past Medical History:   Diagnosis Date    BBB (bundle branch block)     Diabetes     insulin and oral medication    Heart burn     Hyperlipidemia     Hypertension     Indigestion     Pain     knee, shoulder    Pneumonia 1974    Psychiatric problem     depression    Renal calculus 7/2013    kidney stones    Skin rash 3/16/2022    Snoring     sleep apnea questionairre  completed         Past Surgical History:   Procedure Laterality Date    RECOVERY  5/13/2014    Performed by [facility]-Recovery Surgery at SURGERY SAME DAY [facility] ORS    OTHER  2/2014    right knee    KNEE ARTHROSCOPY  8/22/2013    Performed by Maurisio Alfaro M.D. at SURGERY [facility]    MENISCECTOMY, KNEE, MEDIAL  8/22/2013    Performed by Maurisio Alfaro M.D. at SURGERY [facility]    SHOULDER ARTHROTOMY  2003    right    KNEE ARTHROSCOPY  1990    left    KNEE ARTHROSCOPY  1985    right    ORIF, ANKLE  1984    left    ORIF, KNEE  1970    left    TONSILLECTOMY  as child         Current Outpatient Medications   Medication Sig Dispense Refill    triamcinolone acetonide (KENALOG) 0.1 % Cream       Suvorexant 10 MG Tab Take  by mouth at bedtime.      atorvastatin (LIPITOR) 80 MG tablet Take 1 Tablet by mouth every evening. 90 Tablet 0    irbesartan (AVAPRO) 300 MG Tab Take 1 Tablet by mouth every day. Please keep your follow up appointment on 05/04/23 for further refills. Thank you! 90 Tablet 0    metFORMIN (GLUCOPHAGE) 500 MG Tab Take 1 Tablet by mouth 3 times a day. Take 500 mg in the morning and 1000 mg in the pm 270 Tablet 1    carvedilol (COREG) 6.25 MG Tab TAKE 1 TABLET BY MOUTH TWICE DAILY WITH MEALS 180 Tablet 0    Dextromethorphan-guaiFENesin (TUSSIN DM)  MG/5ML Syrup Take 5 mL by mouth every 6 hours as needed (cough). 840 mL 1    gabapentin (NEURONTIN) 800 MG tablet Take 800 mg by mouth 3 times a day.      miconazole (MICOTIN) 2 % Cream Apply 2 mL topically 2 times a day as needed. 118 mL 1    insulin detemir (LEVEMIR) 100 UNIT/ML Solution Inject 30 Units under the skin 2 times a day.      Semaglutide, 1 MG/DOSE, (OZEMPIC, 1 MG/DOSE,) 2 MG/1.5ML Solution Pen-injector every 7 days.      promethazine (PHENERGAN) 25 MG Tab Take 1 Tab by mouth every 6 hours as needed for Nausea/Vomiting. 30 Tab 0    ASPIRIN 81 MG PO TABS Take 81 mg by mouth every day.      hydroCHLOROthiazide  "(HYDRODIURIL) 25 MG Tab Take 1 Tablet by mouth every day. 90 Tablet 1    Continuous Blood Gluc Sensor (FREESTYLE HANH 14 DAY SENSOR) Misc FREESTYLE HANH 14 DAY SENSOR      BD PEN NEEDLE MICRO U/F 32G X 6 MM Misc        No current facility-administered medications for this visit.         Allergies   Allergen Reactions    Penicillins Anaphylaxis, Hives, Swelling and Shortness of Breath    Norvasc [Amlodipine]      Leg swelling         Family History   Problem Relation Age of Onset    Heart Disease Mother     Hypertension Mother     Heart Disease Father     Hypertension Father     Atrial fibrillation Brother          Social History     Socioeconomic History    Marital status:      Spouse name: Not on file    Number of children: Not on file    Years of education: Not on file    Highest education level: Not on file   Occupational History    Not on file   Tobacco Use    Smoking status: Never    Smokeless tobacco: Never   Vaping Use    Vaping Use: Never used   Substance and Sexual Activity    Alcohol use: Yes     Alcohol/week: 1.2 oz     Types: 2 Standard drinks or equivalent per week    Drug use: No    Sexual activity: Not on file   Other Topics Concern    Not on file   Social History Narrative    Not on file     Social Determinants of Health     Financial Resource Strain: Not on file   Food Insecurity: Not on file   Transportation Needs: Not on file   Physical Activity: Not on file   Stress: Not on file   Social Connections: Not on file   Intimate Partner Violence: Not on file   Housing Stability: Not on file         Physical Exam:  Ambulatory Vitals  /76 (BP Location: Left arm, Patient Position: Sitting)   Pulse 80   Resp 16   Ht 1.905 m (6' 3\")   Wt 104 kg (230 lb)   SpO2 95%    Oxygen Therapy:  Pulse Oximetry: 95 %  BP Readings from Last 4 Encounters:   05/04/23 130/76   02/28/23 122/69   12/13/22 (!) 148/78   08/11/22 (!) 150/83       Weight/BMI: Body mass index is 28.75 kg/m².  Wt Readings from " Last 4 Encounters:   05/04/23 104 kg (230 lb)   02/28/23 107 kg (236 lb)   12/13/22 108 kg (239 lb)   08/11/22 109 kg (239 lb 6.4 oz)       General: No apparent distress  Eyes: nl conjunctiva  ENT: OP covered by mask.   Neck: JVP <8 cm H2O, no carotid bruits  Lungs: normal respiratory effort, CTAB  Heart: RRR, no murmurs, no rubs or gallops, no edema bilateral lower extremities. No LV/RV heave on cardiac palpatation. 2+ bilateral radial pulses.     Abdomen: soft, non tender, non distended, no masses, normal bowel sounds.  No HSM.  Extremities/MSK: no clubbing, no cyanosis  Neurological: No focal sensory deficits  Psychiatric: Appropriate affect, A/O x 3, intact judgement and insight  Skin: Warm extremities    Exam repeated in full and unchanged except for as noted above.      Lab Data Review:  Lab Results   Component Value Date/Time    CHOLSTRLTOT 144 06/17/2013 07:12 AM    LDL 71 06/17/2013 07:12 AM    HDL 31 (A) 06/17/2013 07:12 AM    TRIGLYCERIDE 211 (H) 06/17/2013 07:12 AM       Lab Results   Component Value Date/Time    SODIUM 139 09/22/2021 09:01 AM    SODIUM 135 07/07/2020 05:40 PM    POTASSIUM 4.3 09/22/2021 09:01 AM    POTASSIUM 4.0 07/07/2020 05:40 PM    CHLORIDE 101 09/22/2021 09:01 AM    CHLORIDE 97 07/07/2020 05:40 PM    CO2 26 09/22/2021 09:01 AM    CO2 21 07/07/2020 05:40 PM    GLUCOSE 178 (H) 09/22/2021 09:01 AM    GLUCOSE 181 (H) 07/07/2020 05:40 PM    BUN 18 09/22/2021 09:01 AM    BUN 29 (H) 07/07/2020 05:40 PM    CREATININE 0.77 09/22/2021 09:01 AM    CREATININE 1.21 07/07/2020 05:40 PM    BUNCREATRAT 23 09/22/2021 09:01 AM     Lab Results   Component Value Date/Time    ALKPHOSPHAT 76 07/07/2020 05:40 PM    ASTSGOT 15 07/07/2020 05:40 PM    ALTSGPT 17 07/07/2020 05:40 PM    TBILIRUBIN 1.0 07/07/2020 05:40 PM      Lab Results   Component Value Date/Time    WBC 5.0 09/22/2021 09:01 AM    WBC 13.1 (H) 07/07/2020 05:40 PM     No components found for: HBGA1C  No components found for: TROPONIN  No  results found for: BNP      Cardiac Imaging and Procedures Review:    EKG dated 2022 : My personal interpretation is NSR, 1st degree AV block, RBBB, inferior infarct.     Echo dated 2022:  CONCLUSIONS  Normal right and left ventricular size and function.   The left ventricular ejection fraction is visually estimated to be 70%.  Grade I diastolic dysfunction.  Aortic valve sclerosis without significant stenosis.  Trace aortic insufficiency.  Right heart pressures are normal.      Compared to the previous echocardiogram performed on 2013: The   aortic root size is normal in this study and appeared to be over-  estimated in the previous study. No significant change.    [facility] (2014):   FINAL IMPRESSION:  1.  Mild to moderate disease of the moderate disease of the proximal left   anterior descending, about 40%, right near its ostium with a small amount of   post stenotic dilatation.  2.  Near normal to normal left ventricular function at this time.  3.  At this time, we will continue aggressive medical therapy.    Radiology test Review:  CXR: 2020  FINDINGS:    No pulmonary infiltrates or consolidations are noted.  No pleural effusion. No pneumothorax.     Normal cardiopericardial silhouette.     Medical Decision Makin. Primary hypertension  Well controlled  -continue coreg  -continue irbesartan 300mg PO Daily  -continue hctz 25mg PO Daily  -could consider spironolactone as next agent if symptoms from BB.     2. Coronary artery disease involving native coronary artery of native heart without angina pectoris  Non-obstructive disease on previous cath, but this was in the proximal LAD and he has poorly controlled diabetes and hypertension  -continue aspirin 81mg PO daily for primary prevention.   -increase lipitor to 80mg PO daily, goal LDL <55.   -request [facility] labs.     3. Dyslipidemia  Continue lipitor. LDL 81 in 2022 - start zetia. Recheck in 6 weeks.     4. Type 2 diabetes mellitus with  diabetic mononeuropathy, with long-term current use of insulin (HCC)  Per PCP, aggressive control    5. Right bundle branch block  Noted, discussed pacemaker and ER precautions.     6. Aortic aneurysm - Not actually aneurysmal, no need for follow-up.         Return in about 1 year (around 5/4/2024).      Walter Trujillo MD, [facility], [address]  [address] [address]  Phone: [phone]  Fax: [phone]

## 2023-06-06 ENCOUNTER — TELEPHONE (OUTPATIENT)
Dept: INTERNAL MEDICINE | Facility: OTHER | Age: 73
End: 2023-06-06

## 2023-06-06 DIAGNOSIS — F51.01 PRIMARY INSOMNIA: ICD-10-CM

## 2023-06-06 NOTE — TELEPHONE ENCOUNTER
Pharmacy is requesting refill for Belsomra, but I do not see this on patient's medication list, nor in the stopped medications. Can you please review and advise? Thank you!

## 2023-06-14 RX ORDER — SUVOREXANT 10 MG/1
TABLET, FILM COATED ORAL
Qty: 60 TABLET | Refills: 0 | OUTPATIENT
Start: 2023-06-14 | End: 2023-08-13

## 2023-06-15 NOTE — TELEPHONE ENCOUNTER
Per PDMP, patient currently has 1 refill. I will refill in the future with the caveat that the patient should be seen prior to our next refill to briefly discuss side effects and adjust dosing if necessary. Please contact patient to inform them that they would be ideally seen again either virtually or in person the next time I am in clinic the first 2 weeks of July.

## 2023-07-06 ENCOUNTER — TELEPHONE (OUTPATIENT)
Dept: INTERNAL MEDICINE | Facility: OTHER | Age: 73
End: 2023-07-06

## 2023-07-06 ENCOUNTER — OFFICE VISIT (OUTPATIENT)
Dept: INTERNAL MEDICINE | Facility: OTHER | Age: 73
End: 2023-07-06
Payer: MEDICARE

## 2023-07-06 VITALS
SYSTOLIC BLOOD PRESSURE: 131 MMHG | BODY MASS INDEX: 30.44 KG/M2 | HEIGHT: 74 IN | DIASTOLIC BLOOD PRESSURE: 76 MMHG | WEIGHT: 237.2 LBS | HEART RATE: 75 BPM | OXYGEN SATURATION: 91 % | TEMPERATURE: 97.3 F

## 2023-07-06 DIAGNOSIS — Z79.4 TYPE 2 DIABETES MELLITUS WITH DIABETIC MONONEUROPATHY, WITH LONG-TERM CURRENT USE OF INSULIN (HCC): ICD-10-CM

## 2023-07-06 DIAGNOSIS — F51.01 PRIMARY INSOMNIA: ICD-10-CM

## 2023-07-06 DIAGNOSIS — E11.41 TYPE 2 DIABETES MELLITUS WITH DIABETIC MONONEUROPATHY, WITH LONG-TERM CURRENT USE OF INSULIN (HCC): ICD-10-CM

## 2023-07-06 PROCEDURE — 3075F SYST BP GE 130 - 139MM HG: CPT | Mod: GC

## 2023-07-06 PROCEDURE — 3078F DIAST BP <80 MM HG: CPT | Mod: GC

## 2023-07-06 PROCEDURE — 99214 OFFICE O/P EST MOD 30 MIN: CPT | Mod: GC

## 2023-07-06 RX ORDER — DOXEPIN 3 MG/1
3 TABLET, FILM COATED ORAL NIGHTLY
Qty: 30 TABLET | Refills: 0 | Status: SHIPPED | OUTPATIENT
Start: 2023-08-06 | End: 2024-03-04

## 2023-07-06 RX ORDER — ZOLPIDEM TARTRATE 10 MG/1
10 TABLET ORAL NIGHTLY PRN
Qty: 30 TABLET | Refills: 0 | Status: SHIPPED | OUTPATIENT
Start: 2023-07-06 | End: 2023-08-05

## 2023-07-06 ASSESSMENT — ENCOUNTER SYMPTOMS
CARDIOVASCULAR NEGATIVE: 1
INSOMNIA: 1
MUSCULOSKELETAL NEGATIVE: 1
GASTROINTESTINAL NEGATIVE: 1
CHILLS: 0
EYES NEGATIVE: 1
RESPIRATORY NEGATIVE: 1
NEUROLOGICAL NEGATIVE: 1
MEMORY LOSS: 0
FEVER: 0
CONSTITUTIONAL NEGATIVE: 1

## 2023-07-06 ASSESSMENT — LIFESTYLE VARIABLES: SUBSTANCE_ABUSE: 0

## 2023-07-06 ASSESSMENT — FIBROSIS 4 INDEX: FIB4 SCORE: 2.07

## 2023-07-06 NOTE — TELEPHONE ENCOUNTER
Please review the Doxepin RX scanned via [employer]. Insurance does not cover this medication. The preferred alternative is ALTDRUGTHERAPYPREFERREDPRODUCTREQUIRED. Can you please resend another medication to patient pharmacy?

## 2023-07-06 NOTE — TELEPHONE ENCOUNTER
Patient instructed to use GoodRx coupon instead. He will not require insurance coverage for this medicine

## 2023-07-06 NOTE — LETTER
[facility]  Hector Vázquez M.D.  [address] [place] [address]  Fax: [phone]   Authorization for Release/Disclosure of   Protected Health Information   Name: DEJAN MOONEY : 1950 SSN: [id number]   Address: [facility] [address]  [place] [address] Phone:    [phone] (home) [phone] (work)   I authorize the entity listed below to release/disclose the PHI below to:   [facility]/Hector Vázquez M.D. and Hector Vázquez M.D.   Provider or Entity Name:     Address   City, State, Zip   Phone:      Fax:     Reason for request: continuity of care   Information to be released:    [  ] LAST COLONOSCOPY,  including any PATH REPORT and follow-up  [  ] LAST FIT/COLOGUARD RESULT [  ] LAST DEXA  [  ] LAST MAMMOGRAM  [  ] LAST PAP  [  ] LAST LABS [  ] RETINA EXAM REPORT  [  ] IMMUNIZATION RECORDS  [  ] Release all info      [  ] Check here and initial the line next to each item to release ALL health information INCLUDING  _____ Care and treatment for drug and / or alcohol abuse  _____ HIV testing, infection status, or AIDS  _____ Genetic Testing    DATES OF SERVICE OR TIME PERIOD TO BE DISCLOSED: _____________  I understand and acknowledge that:  * This Authorization may be revoked at any time by you in writing, except if your health information has already been used or disclosed.  * Your health information that will be used or disclosed as a result of you signing this authorization could be re-disclosed by the recipient. If this occurs, your re-disclosed health information may no longer be protected by State or Federal laws.  * You may refuse to sign this Authorization. Your refusal will not affect your ability to obtain treatment.  * This Authorization becomes effective upon signing and will  on (date) __________.      If no date is indicated, this Authorization will  one (1) year from the signature date.    Name: Dejan Mooney  Signature: Date:   2023     PLEASE  FAX REQUESTED RECORDS BACK TO: [phone]

## 2023-07-06 NOTE — PROGRESS NOTES
Established Patient    Patient Care Team:  Hector Vázquez M.D. as PCP - General (Internal Medicine)  Jamie Pittman M.D. (Endocrinology)  Walter Trujillo M.D. as Cardiologist (Cardiovascular Disease (Cardiology))    CC:   Chief Complaint   Patient presents with    Follow-Up    Medication Refill       HPI:  Dejan Mooney is a 72 y.o. male who presents today with the following Chief Complaint(s): Follow up for insomnia management and     Patient reports he is tolerating suvorexant well. Patient reports stable dizziness from BPPV and headaches that goes away after coffee in the morning, but has not noticed any other symptom associated with suvorexant.  Primary complaint with this medicine is the cost and patient requesting to be switched back to Ambien or alternating Ambien with suvorexant.    Patient remarks he has had fever, congestion, cough since 3-4 days ago. Patient reports wife was sick prior and believes he got it from his wife.    Seeing Dr. Durant for endocrinology this afternoon, A1c 6.6    Patient states he missed his podiatry appointment but has rescheduled for this month.    ROS:       Review of Systems   Constitutional: Negative.  Negative for chills and fever.   HENT: Negative.     Eyes: Negative.    Respiratory: Negative.     Cardiovascular: Negative.    Gastrointestinal: Negative.    Genitourinary: Negative.    Musculoskeletal: Negative.    Skin: Negative.  Negative for itching and rash.   Neurological: Negative.    Endo/Heme/Allergies: Negative.    Psychiatric/Behavioral:  Negative for memory loss, substance abuse and suicidal ideas. The patient has insomnia.          Past Medical History:   Diagnosis Date    BBB (bundle branch block)     Diabetes     insulin and oral medication    Heart burn     Hyperlipidemia     Hypertension     Indigestion     Pain     knee, shoulder    Pneumonia 1974    Psychiatric problem     depression    Renal calculus 7/2013    kidney stones    Skin rash  3/16/2022    Snoring     sleep apnea questionairre completed     Patient Active Problem List    Diagnosis Date Noted    Diabetic neuropathy (HCC) 08/11/2022    Preventative health care 02/08/2022    Tear of medial meniscus of knee 08/24/2021    Dyslipidemia 08/11/2021    Obstructive sleep apnea, adult 07/26/2021    Gastroesophageal reflux disease 03/11/2021    Proteinuria 07/20/2020    Primary insomnia 04/15/2020    Type 2 diabetes mellitus with diabetic mononeuropathy (HCC) 09/12/2017    Elevated coronary artery calcium score 06/13/2017    Primary hypertension 06/13/2017     Social History     Tobacco Use    Smoking status: Never    Smokeless tobacco: Never   Vaping Use    Vaping Use: Never used   Substance Use Topics    Alcohol use: Yes     Alcohol/week: 1.2 oz     Types: 2 Standard drinks or equivalent per week     Comment: 1x/week    Drug use: No     Current Outpatient Medications   Medication Sig Dispense Refill    triamcinolone acetonide (KENALOG) 0.1 % Cream       Suvorexant 10 MG Tab Take  by mouth at bedtime.      atorvastatin (LIPITOR) 80 MG tablet Take 1 Tablet by mouth every evening. 100 Tablet 3    carvedilol (COREG) 6.25 MG Tab Take 1 Tablet by mouth 2 times a day with meals. 200 Tablet 3    hydroCHLOROthiazide (HYDRODIURIL) 25 MG Tab Take 1 Tablet by mouth every day. 100 Tablet 3    irbesartan (AVAPRO) 300 MG Tab Take 1 Tablet by mouth every day. 100 Tablet 3    ezetimibe (ZETIA) 10 MG Tab Take 1 Tablet by mouth every day. 100 Tablet 3    metFORMIN (GLUCOPHAGE) 500 MG Tab Take 1 Tablet by mouth 3 times a day. Take 500 mg in the morning and 1000 mg in the pm 270 Tablet 1    Dextromethorphan-guaiFENesin (TUSSIN DM)  MG/5ML Syrup Take 5 mL by mouth every 6 hours as needed (cough). 840 mL 1    gabapentin (NEURONTIN) 800 MG tablet Take 800 mg by mouth 3 times a day.      miconazole (MICOTIN) 2 % Cream Apply 2 mL topically 2 times a day as needed. 118 mL 1    Continuous Blood Gluc Sensor (FREESTYLE  "HANH 14 DAY SENSOR) Misc FREESTYLE HANH 14 DAY SENSOR      BD PEN NEEDLE MICRO U/F 32G X 6 MM Misc       insulin detemir (LEVEMIR) 100 UNIT/ML Solution Inject 30 Units under the skin 2 times a day.      Semaglutide, 1 MG/DOSE, (OZEMPIC, 1 MG/DOSE,) 2 MG/1.5ML Solution Pen-injector every 7 days.      promethazine (PHENERGAN) 25 MG Tab Take 1 Tab by mouth every 6 hours as needed for Nausea/Vomiting. 30 Tab 0    ASPIRIN 81 MG PO TABS Take 81 mg by mouth every day.       No current facility-administered medications for this visit.     Past Surgical History:   Procedure Laterality Date    RECOVERY  5/13/2014    Performed by Cath-Recovery Surgery at SURGERY SAME DAY [facility] ORS    OTHER  2/2014    right knee    KNEE ARTHROSCOPY  8/22/2013    Performed by Maurisio Alfaro M.D. at SURGERY [facility]    MENISCECTOMY, KNEE, MEDIAL  8/22/2013    Performed by Maurisio Alfaro M.D. at SURGERY [facility]    SHOULDER ARTHROTOMY  2003    right    KNEE ARTHROSCOPY  1990    left    KNEE ARTHROSCOPY  1985    right    ORIF, ANKLE  1984    left    ORIF, KNEE  1970    left    TONSILLECTOMY  as child     Family History   Problem Relation Age of Onset    Heart Disease Mother     Hypertension Mother     Heart Disease Father     Hypertension Father     Atrial fibrillation Brother      Social History     Tobacco Use    Smoking status: Never    Smokeless tobacco: Never   Vaping Use    Vaping Use: Never used   Substance Use Topics    Alcohol use: Yes     Alcohol/week: 1.2 oz     Types: 2 Standard drinks or equivalent per week     Comment: 1x/week    Drug use: No       Allergies:  Penicillins and Norvasc [amlodipine]    Physical Exam:  /76 (BP Location: Left arm, Patient Position: Sitting, BP Cuff Size: Adult)   Pulse 75   Temp 36.3 °C (97.3 °F) (Temporal)   Ht 1.88 m (6' 2\")   Wt 108 kg (237 lb 3.2 oz)   SpO2 91%   BMI 30.45 kg/m²     General: Well-developed, well-nourished male in no distress  HEENT: " "NC/AT  Eyes: Conjuntiva without any obvious injection or erythema.   Mouth: mucous membranes moist, no erythema  Neck: Supple, moving spontaneously  Extremites: No cyanosis/clubbing/edema. No obvious deformities.  Skin: Warm and dry.  No visible rashes.  Neuro: Alert & oriented, grossly non-focal  Psych: Patient is awake, alert and oriented with recent and remote memory intact. Affect normal, mood normal, judgment normal.    Assessment and Plan:     1. Type 2 diabetes mellitus with diabetic mononeuropathy, with long-term current use of insulin (HCC)  Patient requesting refill of metformin. Will request relevant records to update care gaps in our system  - metFORMIN (GLUCOPHAGE) 500 MG Tab; Take 1 Tablet by mouth 3 times a day. Take 500 mg in the morning and 1000 mg in the pm  Dispense: 270 Tablet; Refill: 1  - request of records sent to Dr. Durant's office.    2. Primary insomnia  Patient counseled on shopping around at other pharmacies or considering changing supplemental insurance. Patient to be given last script for additional 30 days of zolpidem only meant to bridge patient to either finding acceptable insurance or starting new medication. Patient states he understands risk of accelerating onset of dementia as well as daytime drowsiness and \"sleepwalking.\" Given these risks, no further scripts for zolpidem will be provided.  - zolpidem (AMBIEN) 10 MG Tab; Take 1 Tablet by mouth at bedtime as needed for Sleep for up to 30 days.  Dispense: 30 Tablet; Refill: 0  - Doxepin 3 mg nightly after zolpidem supply exhausted.    All imaging results and lab results and consult notes are reviewed at this visit.      Signed by: Hector Vázquez M.D.  PGY II  "

## 2023-07-06 NOTE — PATIENT INSTRUCTIONS
"It was a pleasure seeing you in clinic today.    Today, you were given a 30-day supply of Ambien to bridge while you attempt to find an alternative insurance solution or pharmacy that can cover suvorexant (Belsomra) at a lower cost.  You may also look into changing your supplemental insurance such that you have better coverage of either suvorexant or another alternative called doxepin.  These 2 medications are the safest for the \"geriatric patient\" to treat insomnia.  "

## 2023-09-01 ENCOUNTER — APPOINTMENT (RX ONLY)
Dept: URBAN - METROPOLITAN AREA CLINIC 15 | Facility: CLINIC | Age: 73
Setting detail: DERMATOLOGY
End: 2023-09-01

## 2023-09-01 DIAGNOSIS — L57.0 ACTINIC KERATOSIS: ICD-10-CM

## 2023-09-01 PROCEDURE — ? PDT: BLUE

## 2023-09-01 PROCEDURE — ? PHOTODYNAMIC THERAPY COUNSELING

## 2023-09-01 PROCEDURE — ? PHOTO-DOCUMENTATION

## 2023-09-01 ASSESSMENT — LOCATION SIMPLE DESCRIPTION DERM: LOCATION SIMPLE: LEFT CHEEK

## 2023-09-01 ASSESSMENT — LOCATION ZONE DERM: LOCATION ZONE: FACE

## 2023-09-01 ASSESSMENT — LOCATION DETAILED DESCRIPTION DERM: LOCATION DETAILED: LEFT CENTRAL MALAR CHEEK

## 2023-09-01 NOTE — PROCEDURE: PDT: BLUE
Show Medical Necessity In Plan?: Yes
Who Performed The Pdt (Staff): Nadia Carey MA
Treatment Number: 0
Who Performed The Pdt?: Performed by Nurse, MA or Aesthetician ([id number])
Consent: Written consent obtained.  The risks were reviewed with the patient including but not limited to: Pigmentary changes, pain, blistering, scabbing, redness, and the remote possibility of scarring.
Was Levulan/Ameluz Applied On A Previous Day?: No
Medical Necessity: Precancerous Lesions
Which Photosensitizer Was Used: Levulan
Incubation Time: 02:00
Detail Level: Zone
Post-Care Instructions: I reviewed with the patient in detail post-care instructions. Patient is to avoid sunlight for the next 2 days, and wear sun protection. Patients may expect sunburn like redness, discomfort and scabbing.
Pre-Procedure Text: The treatment areas were cleaned and prepped in the usual fashion.
Number Of Kerasticks/Tubes Billed For: 1
Light Source: Nikolai-U
Anesthesia Type: 1% lidocaine with epinephrine
Illumination Time: 00:16:40

## 2023-12-13 ENCOUNTER — OFFICE VISIT (OUTPATIENT)
Dept: INTERNAL MEDICINE | Facility: OTHER | Age: 73
End: 2023-12-13
Payer: MEDICARE

## 2023-12-13 VITALS
TEMPERATURE: 97.6 F | SYSTOLIC BLOOD PRESSURE: 150 MMHG | BODY MASS INDEX: 29.34 KG/M2 | HEIGHT: 75 IN | OXYGEN SATURATION: 96 % | WEIGHT: 236 LBS | DIASTOLIC BLOOD PRESSURE: 84 MMHG | HEART RATE: 76 BPM

## 2023-12-13 DIAGNOSIS — R93.1 ELEVATED CORONARY ARTERY CALCIUM SCORE: ICD-10-CM

## 2023-12-13 DIAGNOSIS — E11.40 DIABETIC NEUROPATHY (HCC): ICD-10-CM

## 2023-12-13 DIAGNOSIS — Z23 NEED FOR VACCINATION: ICD-10-CM

## 2023-12-13 DIAGNOSIS — E11.41 TYPE 2 DIABETES MELLITUS WITH DIABETIC MONONEUROPATHY, WITH LONG-TERM CURRENT USE OF INSULIN (HCC): ICD-10-CM

## 2023-12-13 DIAGNOSIS — Z79.4 TYPE 2 DIABETES MELLITUS WITH DIABETIC MONONEUROPATHY, WITH LONG-TERM CURRENT USE OF INSULIN (HCC): ICD-10-CM

## 2023-12-13 DIAGNOSIS — E78.5 DYSLIPIDEMIA: ICD-10-CM

## 2023-12-13 DIAGNOSIS — I25.10 CORONARY ARTERY DISEASE INVOLVING NATIVE CORONARY ARTERY OF NATIVE HEART WITHOUT ANGINA PECTORIS: ICD-10-CM

## 2023-12-13 DIAGNOSIS — F51.01 PRIMARY INSOMNIA: ICD-10-CM

## 2023-12-13 DIAGNOSIS — I10 PRIMARY HYPERTENSION: ICD-10-CM

## 2023-12-13 PROCEDURE — 90662 IIV NO PRSV INCREASED AG IM: CPT

## 2023-12-13 PROCEDURE — 3079F DIAST BP 80-89 MM HG: CPT

## 2023-12-13 PROCEDURE — 1125F AMNT PAIN NOTED PAIN PRSNT: CPT

## 2023-12-13 PROCEDURE — 99214 OFFICE O/P EST MOD 30 MIN: CPT | Mod: GC,25

## 2023-12-13 PROCEDURE — G0008 ADMIN INFLUENZA VIRUS VAC: HCPCS

## 2023-12-13 PROCEDURE — 3077F SYST BP >= 140 MM HG: CPT

## 2023-12-13 RX ORDER — SEMAGLUTIDE 1.34 MG/ML
1 INJECTION, SOLUTION SUBCUTANEOUS
Qty: 1.5 ML | Refills: 2 | Status: SHIPPED | OUTPATIENT
Start: 2023-12-13

## 2023-12-13 RX ORDER — ATORVASTATIN CALCIUM 80 MG/1
80 TABLET, FILM COATED ORAL NIGHTLY
Qty: 30 TABLET | Refills: 2 | Status: SHIPPED | OUTPATIENT
Start: 2023-12-13 | End: 2024-03-04

## 2023-12-13 RX ORDER — HYDROCHLOROTHIAZIDE 25 MG/1
25 TABLET ORAL DAILY
Qty: 30 TABLET | Refills: 2 | Status: SHIPPED | OUTPATIENT
Start: 2023-12-13 | End: 2024-03-04

## 2023-12-13 RX ORDER — CARVEDILOL 6.25 MG/1
6.25 TABLET ORAL 2 TIMES DAILY WITH MEALS
Qty: 60 TABLET | Refills: 2 | Status: SHIPPED | OUTPATIENT
Start: 2023-12-13 | End: 2024-03-04

## 2023-12-13 RX ORDER — IRBESARTAN 300 MG/1
300 TABLET ORAL DAILY
Qty: 30 TABLET | Refills: 2 | Status: SHIPPED | OUTPATIENT
Start: 2023-12-13 | End: 2024-03-04

## 2023-12-13 RX ORDER — GABAPENTIN 800 MG/1
800 TABLET ORAL 3 TIMES DAILY
Qty: 90 TABLET | Refills: 2 | Status: SHIPPED | OUTPATIENT
Start: 2023-12-13

## 2023-12-13 ASSESSMENT — ENCOUNTER SYMPTOMS
ABDOMINAL PAIN: 0
PALPITATIONS: 0
SORE THROAT: 0
BACK PAIN: 0
VOMITING: 0
FEVER: 0
SHORTNESS OF BREATH: 0
CHILLS: 0
MYALGIAS: 0
DIZZINESS: 0
NAUSEA: 0
COUGH: 0
HEADACHES: 0

## 2023-12-13 ASSESSMENT — PAIN SCALES - GENERAL: PAINLEVEL: 4=SLIGHT-MODERATE PAIN

## 2023-12-13 NOTE — TELEPHONE ENCOUNTER
Received request via: Pharmacy    Was the patient seen in the last year in this department? Yes    Does the patient have an active prescription (recently filled or refills available) for medication(s) requested?  yes    Does the patient have skilled nursing Plus and need 100 day supply (blood pressure, diabetes and cholesterol meds only)? Patient does not have SCP

## 2023-12-13 NOTE — PROGRESS NOTES
Chief Complaint: follow up for med refill    Last Seen: 7/6/23    History of Present Illness:   Dejan Mooney is a 73 y.o. male with PMH relevant for T2DM, hypertension, BPPV, insomnia, dyslipidemia who presents with follow up for medication refill. Patient is requesting refills on his atorvastatin, carvedilol, gabapentin, HCTZ, irbesartan, metformin, and ozempic. He is being followed by cardiology, endocrinology, and pain management.     Patient states his insomnia is well-controlled. He has been taking Ambien prescribed by his pain management doctor on and off. He is able to get about 7 hours of sleep and is well-rested throughout the day. He has not been taking doxepin or suvorexant.     Patient states his blood pressure at home have been ranging high 120s to low 130s. His blood pressure is being managed by cardiology. He denies headache, dizziness, lightheadedness, chest pain, or palpitations. Patient has a Fios continuous glucose monitor and has an average glucose in the 120s. He states his last A1c was 7.6%.     Review of Systems:  Review of Systems   Constitutional:  Negative for chills and fever.   HENT:  Negative for congestion and sore throat.    Respiratory:  Negative for cough and shortness of breath.    Cardiovascular:  Negative for chest pain and palpitations.   Gastrointestinal:  Negative for abdominal pain, nausea and vomiting.   Genitourinary:  Negative for dysuria and hematuria.   Musculoskeletal:  Negative for back pain and myalgias.   Skin:  Negative for itching and rash.   Neurological:  Negative for dizziness and headaches.        Medications:     Current Outpatient Medications:     carvedilol, 6.25 mg, Oral, BID WITH MEALS    gabapentin, 800 mg, Oral, TID    Ozempic (1 MG/DOSE), 1 mg, Subcutaneous, Q7 DAYS    hydroCHLOROthiazide, 25 mg, Oral, DAILY    irbesartan, 300 mg, Oral, DAILY    atorvastatin, 80 mg, Oral, Nightly    metFORMIN, 500 mg, Oral, TID    Doxepin HCl, 3 mg,  "Oral, Nightly, Taking    triamcinolone acetonide, , Taking    Suvorexant, Take  by mouth at bedtime., Taking    ezetimibe, 10 mg, Oral, DAILY, Taking    FreeStyle Shabbir 14 Day Sensor, FREESTYLE SHABBIR 14 DAY SENSOR, Taking    BD Pen Needle Micro U/F, , Taking    insulin detemir, 30 Units, Subcutaneous, BID, Taking    aspirin, 81 mg, Oral, DAILY, Taking     Objective:  Vitals:   BP (!) 150/84 (BP Location: Right arm, Patient Position: Sitting, BP Cuff Size: Adult long)   Pulse 76   Temp 36.4 °C (97.6 °F) (Temporal)   Ht 1.905 m (6' 3\")   Wt 107 kg (236 lb)   SpO2 96%  Body mass index is 29.5 kg/m².    Physical Exam  Constitutional:       General: He is not in acute distress.     Appearance: Normal appearance.   HENT:      Head: Normocephalic and atraumatic.   Eyes:      Extraocular Movements: Extraocular movements intact.      Pupils: Pupils are equal, round, and reactive to light.   Cardiovascular:      Rate and Rhythm: Normal rate and regular rhythm.   Pulmonary:      Effort: No respiratory distress.      Breath sounds: Normal breath sounds.   Abdominal:      Palpations: Abdomen is soft.      Tenderness: There is no abdominal tenderness.   Musculoskeletal:      Cervical back: Neck supple. No rigidity.   Neurological:      General: No focal deficit present.      Mental Status: He is oriented to person, place, and time.          Results:  Lab Results   Component Value Date/Time    CHOLSTRLTOT 90 (L) 06/29/2023 05:46 AM    CHOLSTRLTOT 144 06/17/2013 07:12 AM    LDL 71 06/17/2013 07:12 AM    HDL 35 (L) 06/29/2023 05:46 AM    HDL 31 (A) 06/17/2013 07:12 AM    TRIGLYCERIDE 84 06/29/2023 05:46 AM    TRIGLYCERIDE 211 (H) 06/17/2013 07:12 AM       Lab Results   Component Value Date/Time    SODIUM 143 06/29/2023 05:46 AM    SODIUM 135 07/07/2020 05:40 PM    POTASSIUM 4.9 06/29/2023 05:46 AM    POTASSIUM 4.0 07/07/2020 05:40 PM    CHLORIDE 104 06/29/2023 05:46 AM    CHLORIDE 97 07/07/2020 05:40 PM    CO2 26 06/29/2023 " 05:46 AM    CO2 21 07/07/2020 05:40 PM    GLUCOSE 106 (H) 06/29/2023 05:46 AM    GLUCOSE 181 (H) 07/07/2020 05:40 PM    BUN 31 (H) 06/29/2023 05:46 AM    BUN 29 (H) 07/07/2020 05:40 PM    CREATININE 1.08 06/29/2023 05:46 AM    CREATININE 1.21 07/07/2020 05:40 PM    BUNCREATRAT 29 (H) 06/29/2023 05:46 AM     Lab Results   Component Value Date/Time    ALKPHOSPHAT 88 06/29/2023 05:46 AM    ALKPHOSPHAT 76 07/07/2020 05:40 PM    ASTSGOT 20 06/29/2023 05:46 AM    ASTSGOT 15 07/07/2020 05:40 PM    ALTSGPT 20 06/29/2023 05:46 AM    ALTSGPT 17 07/07/2020 05:40 PM    TBILIRUBIN 0.5 06/29/2023 05:46 AM    TBILIRUBIN 1.0 07/07/2020 05:40 PM        Lab Results   Component Value Date/Time    WBC 5.0 09/22/2021 09:01 AM    WBC 13.1 (H) 07/07/2020 05:40 PM    RBC 4.85 09/22/2021 09:01 AM    RBC 4.89 07/07/2020 05:40 PM    HEMOGLOBIN 14.6 09/22/2021 09:01 AM    HEMOGLOBIN 14.6 07/07/2020 05:40 PM    HEMATOCRIT 44.1 09/22/2021 09:01 AM    HEMATOCRIT 43.8 07/07/2020 05:40 PM    MCV 91 09/22/2021 09:01 AM    MCV 89.6 07/07/2020 05:40 PM    MCH 30.1 09/22/2021 09:01 AM    MCH 29.9 07/07/2020 05:40 PM    MCHC 33.1 09/22/2021 09:01 AM    MCHC 33.3 (L) 07/07/2020 05:40 PM    MPV 9.2 07/07/2020 05:40 PM    NEUTSPOLYS 88.40 (H) 07/07/2020 05:40 PM    LYMPHOCYTES 4.10 (L) 07/07/2020 05:40 PM    MONOCYTES 6.40 07/07/2020 05:40 PM    EOSINOPHILS 0.00 07/07/2020 05:40 PM    BASOPHILS 0.30 07/07/2020 05:40 PM    HYPOCHROMIA 1+ 06/17/2013 07:12 AM        Assessment and Plan:    #hypertension  /84 today in clinic. Pt asymptomatic. His BP at home have been ranging 120-130s. BP is being managed by his cardiologist (Dr. Trujillo). Currently taking carvedilol 6.25 mg twice daily, HCTZ 25 mg daily, ibresartan 300 mg daily   -refilled medications above  -continue follow up with cardiology    #CAD   #dyslipidemia  Per cardiologist, goal LDL <55   Currently on aspirin 81 mg daily, atorvastatin 80 mg daily, zetia 10 mg daily  -atorvastatin  refilled  -continue medications as above    #type 2 diabetes mellitus  Pt following with endocrinologist (Dr. Durant). Reported recent A1c of 7.6%. Average glucose at home have been in 120s. Currently on metformin 500 mg 3 times daily, insulin detemir 30U twice a day, and ozempic.  -refilled ozempic  -continue follow up with endocrinology  -advised patient to have endocrinology records sent to us    #neuropathy  Neuropathy in bilateral feet. Following with pain management. Semi-controlled on current dose gabapentin (800 mg TID). States neuropathy worsens at night.  -continue follow up with pain management    #insomnia  Previous trial of doxepin and suvorexant. Pt now taking Ambien prescribed by pain management with effective relief.     #Need for vaccination  - INFLUENZA VACCINE QUAD INJ (PF)    Healthcare Maintenance  - Vaccinations: influenza vaccine  - Screenings: normal colonoscopy 1 year ago  - Preventative Care: Counseled patient on benefits of healthy diet and consistent exercise    Follow Up:  Return in about 3 months (around 3/13/2024).      Ariel Santamaria DO  Internal Medicine PGY-1  [facility]

## 2024-02-29 LAB — HBA1C MFR BLD: 8.6 % (ref ?–5.8)

## 2024-03-04 ENCOUNTER — OFFICE VISIT (OUTPATIENT)
Dept: CARDIOLOGY | Facility: MEDICAL CENTER | Age: 74
End: 2024-03-04
Attending: INTERNAL MEDICINE
Payer: MEDICARE

## 2024-03-04 VITALS
HEART RATE: 82 BPM | RESPIRATION RATE: 16 BRPM | HEIGHT: 75 IN | DIASTOLIC BLOOD PRESSURE: 70 MMHG | WEIGHT: 224 LBS | BODY MASS INDEX: 27.85 KG/M2 | OXYGEN SATURATION: 95 % | SYSTOLIC BLOOD PRESSURE: 130 MMHG

## 2024-03-04 DIAGNOSIS — Z79.4 TYPE 2 DIABETES MELLITUS WITH DIABETIC MONONEUROPATHY, WITH LONG-TERM CURRENT USE OF INSULIN (HCC): ICD-10-CM

## 2024-03-04 DIAGNOSIS — I10 PRIMARY HYPERTENSION: ICD-10-CM

## 2024-03-04 DIAGNOSIS — R93.1 ELEVATED CORONARY ARTERY CALCIUM SCORE: ICD-10-CM

## 2024-03-04 DIAGNOSIS — E78.5 DYSLIPIDEMIA: ICD-10-CM

## 2024-03-04 DIAGNOSIS — E11.41 TYPE 2 DIABETES MELLITUS WITH DIABETIC MONONEUROPATHY, WITH LONG-TERM CURRENT USE OF INSULIN (HCC): ICD-10-CM

## 2024-03-04 PROCEDURE — 99213 OFFICE O/P EST LOW 20 MIN: CPT | Performed by: INTERNAL MEDICINE

## 2024-03-04 PROCEDURE — 3075F SYST BP GE 130 - 139MM HG: CPT | Performed by: INTERNAL MEDICINE

## 2024-03-04 PROCEDURE — 3078F DIAST BP <80 MM HG: CPT | Performed by: INTERNAL MEDICINE

## 2024-03-04 PROCEDURE — 99214 OFFICE O/P EST MOD 30 MIN: CPT | Performed by: INTERNAL MEDICINE

## 2024-03-04 RX ORDER — HYDROCHLOROTHIAZIDE 25 MG/1
25 TABLET ORAL DAILY
Qty: 100 TABLET | Refills: 3 | Status: SHIPPED | OUTPATIENT
Start: 2024-03-04

## 2024-03-04 RX ORDER — EZETIMIBE 10 MG/1
10 TABLET ORAL DAILY
Qty: 100 TABLET | Refills: 3 | Status: SHIPPED | OUTPATIENT
Start: 2024-03-04

## 2024-03-04 RX ORDER — IRBESARTAN 300 MG/1
300 TABLET ORAL DAILY
Qty: 100 TABLET | Refills: 3 | Status: SHIPPED | OUTPATIENT
Start: 2024-03-04

## 2024-03-04 RX ORDER — CARVEDILOL 6.25 MG/1
12.5 TABLET ORAL 2 TIMES DAILY WITH MEALS
Qty: 200 TABLET | Refills: 3 | Status: SHIPPED | OUTPATIENT
Start: 2024-03-04

## 2024-03-04 RX ORDER — ATORVASTATIN CALCIUM 80 MG/1
80 TABLET, FILM COATED ORAL NIGHTLY
Qty: 100 TABLET | Refills: 3 | Status: SHIPPED | OUTPATIENT
Start: 2024-03-04

## 2024-03-04 RX ORDER — ZOLPIDEM TARTRATE 10 MG/1
10 TABLET ORAL NIGHTLY PRN
COMMUNITY

## 2024-03-04 NOTE — PROGRESS NOTES
Cardiology Follow-up Consultation Note    Date of note:    3/4/2024  Primary Care Provider: Garfield Kraft D.O.  Referring Provider: Self    Patient Name: Dejan Mooney   YOB: 1950  MRN:              [id number]    Chief Complaint: hypertension    History of Present Illness: Dejan Mooney is a 73 y.o. male whose current medical problems include insulin dependent diabetes, hypertension, and non-obstructive disease on previous cath who is here for cardiac consultation for hypertension.    At our initial visit, 2/22/2022:  Has chronic BPPV, mild symptoms now.     Some mild leg swelling. Exercising around 15 minutes a day, asymptomatic.     BP in the 130s-140s/70s.     Last hgbA1c 7.6    At our visit, 5/4/2023:  Patient denies chest pain, palpitations, dyspnea on exertion, pre-syncope, syncope, lower extremity swelling, orthopnea, PND or recent weight gain.     + neuropathy    Starting to bike no symptoms.     Interim Events:  Weight at 225-235. Ozempic related.     Review of Systems   Skin:  Positive for rash.   Allergic/Immunologic: Positive for environmental allergies.       No CVA, no fevers, no bleeding issues.       Past Medical History:   Diagnosis Date    BBB (bundle branch block)     Diabetes     insulin and oral medication    Heart burn     Hyperlipidemia     Hypertension     Indigestion     Pain     knee, shoulder    Pneumonia 1974    Psychiatric problem     depression    Renal calculus 7/2013    kidney stones    Skin rash 3/16/2022    Snoring     sleep apnea questionairre completed         Past Surgical History:   Procedure Laterality Date    RECOVERY  5/13/2014    Performed by Cath-Recovery Surgery at SURGERY SAME DAY [facility] ORS    OTHER  2/2014    right knee    KNEE ARTHROSCOPY  8/22/2013    Performed by Maurisio Alfaro M.D. at SURGERY [facility]    MENISCECTOMY, KNEE, MEDIAL  8/22/2013    Performed by Maurisio Alfaro M.D. at SURGERY  [facility] ORS    SHOULDER ARTHROTOMY  2003    right    KNEE ARTHROSCOPY  1990    left    KNEE ARTHROSCOPY  1985    right    ORIF, ANKLE  1984    left    ORIF, KNEE  1970    left    TONSILLECTOMY  as child         Current Outpatient Medications   Medication Sig Dispense Refill    zolpidem (AMBIEN) 10 MG Tab Take 10 mg by mouth at bedtime as needed for Sleep.      carvedilol (COREG) 6.25 MG Tab Take 1 Tablet by mouth 2 times a day with meals. 60 Tablet 2    gabapentin (NEURONTIN) 800 MG tablet Take 1 Tablet by mouth 3 times a day. 90 Tablet 2    Semaglutide, 1 MG/DOSE, (OZEMPIC, 1 MG/DOSE,) 2 MG/1.5ML Solution Pen-injector Inject 1 mg under the skin every 7 days. 1.5 mL 2    hydroCHLOROthiazide 25 MG Tab Take 1 Tablet by mouth every day. (Patient taking differently: Take 25 mg by mouth every day. 50mg bid) 30 Tablet 2    irbesartan (AVAPRO) 300 MG Tab Take 1 Tablet by mouth every day. 30 Tablet 2    atorvastatin (LIPITOR) 80 MG tablet Take 1 Tablet by mouth every evening. 30 Tablet 2    metFORMIN (GLUCOPHAGE) 500 MG Tab TAKE 1 TABLET BY MOUTH IN THE MORNING AND 2 TABLETS IN THE EVENING 270 Tablet 0    triamcinolone acetonide (KENALOG) 0.1 % Cream       ezetimibe (ZETIA) 10 MG Tab Take 1 Tablet by mouth every day. 100 Tablet 3    Continuous Blood Gluc Sensor (FREESTYLE HANH 14 DAY SENSOR) Misc FREESTYLE HANH 14 DAY SENSOR      BD PEN NEEDLE MICRO U/F 32G X 6 MM Misc       insulin detemir (LEVEMIR) 100 UNIT/ML Solution Inject 30 Units under the skin 2 times a day.      ASPIRIN 81 MG PO TABS Take 81 mg by mouth every day.      Doxepin HCl 3 MG Tab Take 3 mg by mouth every evening. (Patient not taking: Reported on 3/4/2024) 30 Tablet 0    Suvorexant 10 MG Tab Take  by mouth at bedtime. (Patient not taking: Reported on 3/4/2024)       No current facility-administered medications for this visit.         Allergies   Allergen Reactions    Penicillins Anaphylaxis, Hives, Swelling and Shortness of Breath    Norvasc  "[Amlodipine]      Leg swelling         Family History   Problem Relation Age of Onset    Heart Disease Mother     Hypertension Mother     Heart Disease Father     Hypertension Father     Atrial fibrillation Brother          Social History     Socioeconomic History    Marital status:      Spouse name: Not on file    Number of children: Not on file    Years of education: Not on file    Highest education level: Not on file   Occupational History    Not on file   Tobacco Use    Smoking status: Never    Smokeless tobacco: Never   Vaping Use    Vaping Use: Never used   Substance and Sexual Activity    Alcohol use: Yes     Alcohol/week: 1.2 oz     Types: 2 Standard drinks or equivalent per week     Comment: 1x/week    Drug use: No    Sexual activity: Not on file   Other Topics Concern    Not on file   Social History Narrative    Not on file     Social Determinants of Health     Financial Resource Strain: Not on file   Food Insecurity: Not on file   Transportation Needs: Not on file   Physical Activity: Not on file   Stress: Not on file   Social Connections: Not on file   Intimate Partner Violence: Not on file   Housing Stability: Not on file         Physical Exam:  Ambulatory Vitals  /70 (BP Location: Left arm, Patient Position: Sitting, BP Cuff Size: Adult)   Pulse 82   Resp 16   Ht 1.905 m (6' 3\")   Wt 102 kg (224 lb)   SpO2 95%    Oxygen Therapy:  Pulse Oximetry: 95 %  BP Readings from Last 4 Encounters:   03/04/24 130/70   12/13/23 (!) 150/84   07/06/23 131/76   05/04/23 130/76       Weight/BMI: Body mass index is 28 kg/m².  Wt Readings from Last 4 Encounters:   03/04/24 102 kg (224 lb)   12/13/23 107 kg (236 lb)   07/06/23 108 kg (237 lb 3.2 oz)   05/04/23 104 kg (230 lb)       General: No apparent distress  Eyes: nl conjunctiva  Neck: JVP 4-5 cm H2O, no carotid bruits  Lungs: normal respiratory effort, CTAB  Heart: RRR, no murmurs, no rubs or gallops, trace to 1+ edema bilateral lower extremities. " "No LV/RV heave on cardiac palpatation. 2+ bilateral radial pulses.     Abdomen: soft, non tender, non distended, no masses, normal bowel sounds.  No HSM.  Extremities/MSK: no clubbing, no cyanosis  Neurological: No focal sensory deficits  Psychiatric: Appropriate affect, A/O x 3, intact judgement and insight  Skin: Warm extremities    Exam repeated in full and unchanged except for as noted above.      Lab Data Review:  Lab Results   Component Value Date/Time    CHOLSTRLTOT 90 (L) 06/29/2023 05:46 AM    CHOLSTRLTOT 144 06/17/2013 07:12 AM    LDL 71 06/17/2013 07:12 AM    HDL 35 (L) 06/29/2023 05:46 AM    HDL 31 (A) 06/17/2013 07:12 AM    TRIGLYCERIDE 84 06/29/2023 05:46 AM    TRIGLYCERIDE 211 (H) 06/17/2013 07:12 AM       Lab Results   Component Value Date/Time    SODIUM 143 06/29/2023 05:46 AM    SODIUM 135 07/07/2020 05:40 PM    POTASSIUM 4.9 06/29/2023 05:46 AM    POTASSIUM 4.0 07/07/2020 05:40 PM    CHLORIDE 104 06/29/2023 05:46 AM    CHLORIDE 97 07/07/2020 05:40 PM    CO2 26 06/29/2023 05:46 AM    CO2 21 07/07/2020 05:40 PM    GLUCOSE 106 (H) 06/29/2023 05:46 AM    GLUCOSE 181 (H) 07/07/2020 05:40 PM    BUN 31 (H) 06/29/2023 05:46 AM    BUN 29 (H) 07/07/2020 05:40 PM    CREATININE 1.08 06/29/2023 05:46 AM    CREATININE 1.21 07/07/2020 05:40 PM    BUNCREATRAT 29 (H) 06/29/2023 05:46 AM     Lab Results   Component Value Date/Time    ALKPHOSPHAT 88 06/29/2023 05:46 AM    ALKPHOSPHAT 76 07/07/2020 05:40 PM    ASTSGOT 20 06/29/2023 05:46 AM    ASTSGOT 15 07/07/2020 05:40 PM    ALTSGPT 20 06/29/2023 05:46 AM    ALTSGPT 17 07/07/2020 05:40 PM    TBILIRUBIN 0.5 06/29/2023 05:46 AM    TBILIRUBIN 1.0 07/07/2020 05:40 PM      Lab Results   Component Value Date/Time    WBC 5.0 09/22/2021 09:01 AM    WBC 13.1 (H) 07/07/2020 05:40 PM    HEMOGLOBIN 14.6 09/22/2021 09:01 AM    HEMOGLOBIN 14.6 07/07/2020 05:40 PM     No components found for: \"HBGA1C\"  No components found for: \"TROPONIN\"  No results found for: \"BNP\"      Cardiac " Imaging and Procedures Review:    EKG dated 2022 : My personal interpretation is NSR, 1st degree AV block, RBBB, inferior infarct.     Echo dated 2022:  CONCLUSIONS  Normal right and left ventricular size and function.   The left ventricular ejection fraction is visually estimated to be 70%.  Grade I diastolic dysfunction.  Aortic valve sclerosis without significant stenosis.  Trace aortic insufficiency.  Right heart pressures are normal.      Compared to the previous echocardiogram performed on 2013: The   aortic root size is normal in this study and appeared to be over-  estimated in the previous study. No significant change.    [facility] (2014):   FINAL IMPRESSION:  1.  Mild to moderate disease of the moderate disease of the proximal left   anterior descending, about 40%, right near its ostium with a small amount of   post stenotic dilatation.  2.  Near normal to normal left ventricular function at this time.  3.  At this time, we will continue aggressive medical therapy.    Radiology test Review:  CXR: 2020  FINDINGS:    No pulmonary infiltrates or consolidations are noted.  No pleural effusion. No pneumothorax.     Normal cardiopericardial silhouette.     Medical Decision Makin. Primary hypertension  Poorly controlled  -increase coreg.   -continue irbesartan 300mg PO Daily  -continue hctz 25mg PO Daily  -could consider spironolactone if not dehydrated on next labs if needs another agent. Otherwise discussed starting tenex 1mg PO QHS, goal SBP <130/85.   -decrease salt in diet, will help leg swelling.     2. Coronary artery disease involving native coronary artery of native heart without angina pectoris  Non-obstructive disease on previous cath, but this was in the proximal LAD and he has poorly controlled diabetes and hypertension  -continue aspirin 81mg PO daily for primary prevention.   -continue lipitor, zetia.     3. Dyslipidemia  Continue lipitor, zetia, goal LDL <55.     4. Type 2  diabetes mellitus with diabetic mononeuropathy, with long-term current use of insulin (HCC)  Per PCP, aggressive control    5. Right bundle branch block  Noted, discussed pacemaker and ER precautions.     6. Aortic aneurysm - Not actually aneurysmal, no need for follow-up.     Return in about 1 year (around 3/4/2025).    Walter Trujillo MD, [facility], [address]  [address] [address]  Phone: [phone]  Fax: [phone]

## 2024-03-04 NOTE — PATIENT INSTRUCTIONS
Please increase carvedilol (also called coreg) to 12.5mg twice a day to help with blood pressure.

## 2024-03-06 ENCOUNTER — APPOINTMENT (OUTPATIENT)
Dept: INTERNAL MEDICINE | Facility: OTHER | Age: 74
End: 2024-03-06
Payer: MEDICARE

## 2024-04-08 ENCOUNTER — APPOINTMENT (OUTPATIENT)
Dept: INTERNAL MEDICINE | Facility: OTHER | Age: 74
End: 2024-04-08
Payer: MEDICARE

## 2024-04-09 ENCOUNTER — TELEPHONE (OUTPATIENT)
Dept: INTERNAL MEDICINE | Facility: OTHER | Age: 74
End: 2024-04-09
Payer: COMMERCIAL

## 2024-04-09 NOTE — TELEPHONE ENCOUNTER
Called patient to reschedule appointment from last no showed appointment. Patient scheduled for 4/12.  This is the first documentation for no shows

## 2024-04-12 ENCOUNTER — OFFICE VISIT (OUTPATIENT)
Dept: INTERNAL MEDICINE | Facility: OTHER | Age: 74
End: 2024-04-12
Payer: MEDICARE

## 2024-04-12 VITALS
TEMPERATURE: 97.6 F | DIASTOLIC BLOOD PRESSURE: 78 MMHG | OXYGEN SATURATION: 92 % | HEART RATE: 72 BPM | HEIGHT: 75 IN | WEIGHT: 235.4 LBS | SYSTOLIC BLOOD PRESSURE: 152 MMHG | BODY MASS INDEX: 29.27 KG/M2

## 2024-04-12 DIAGNOSIS — Z11.59 NEED FOR HEPATITIS C SCREENING TEST: ICD-10-CM

## 2024-04-12 DIAGNOSIS — F51.01 PRIMARY INSOMNIA: ICD-10-CM

## 2024-04-12 DIAGNOSIS — Z79.4 TYPE 2 DIABETES MELLITUS WITH DIABETIC MONONEUROPATHY, WITH LONG-TERM CURRENT USE OF INSULIN (HCC): ICD-10-CM

## 2024-04-12 DIAGNOSIS — I10 PRIMARY HYPERTENSION: ICD-10-CM

## 2024-04-12 DIAGNOSIS — E11.40 DIABETIC NEUROPATHY (HCC): ICD-10-CM

## 2024-04-12 DIAGNOSIS — E11.41 TYPE 2 DIABETES MELLITUS WITH DIABETIC MONONEUROPATHY, WITH LONG-TERM CURRENT USE OF INSULIN (HCC): ICD-10-CM

## 2024-04-12 DIAGNOSIS — Z13.228 SCREENING FOR METABOLIC DISORDER: ICD-10-CM

## 2024-04-12 PROBLEM — S83.249A TEAR OF MEDIAL MENISCUS OF KNEE: Status: RESOLVED | Noted: 2021-08-24 | Resolved: 2024-04-12

## 2024-04-12 PROBLEM — R80.9 PROTEINURIA: Status: RESOLVED | Noted: 2020-07-20 | Resolved: 2024-04-12

## 2024-04-12 RX ORDER — DOXYCYCLINE HYCLATE 100 MG/1
1 CAPSULE ORAL 2 TIMES DAILY
COMMUNITY
End: 2024-04-12

## 2024-04-12 RX ORDER — AZITHROMYCIN 250 MG/1
TABLET, FILM COATED ORAL
COMMUNITY
End: 2024-04-12

## 2024-04-12 RX ORDER — MOXIFLOXACIN 5 MG/ML
SOLUTION/ DROPS OPHTHALMIC
COMMUNITY
End: 2024-04-12

## 2024-04-12 RX ORDER — SILVER SULFADIAZINE 1 %
CREAM (GRAM) TOPICAL
COMMUNITY
Start: 2024-02-29

## 2024-04-12 RX ORDER — CIPROFLOXACIN 500 MG/1
TABLET, FILM COATED ORAL
COMMUNITY
End: 2024-04-12

## 2024-04-12 ASSESSMENT — ENCOUNTER SYMPTOMS
DIZZINESS: 0
MYALGIAS: 0
BACK PAIN: 0
ABDOMINAL PAIN: 0
HEADACHES: 0
NAUSEA: 0
PALPITATIONS: 0
SHORTNESS OF BREATH: 0
VOMITING: 0
FEVER: 0
CHILLS: 0
SORE THROAT: 0
COUGH: 0

## 2024-04-12 ASSESSMENT — PATIENT HEALTH QUESTIONNAIRE - PHQ9: CLINICAL INTERPRETATION OF PHQ2 SCORE: 0

## 2024-04-12 NOTE — LETTER
[employer] [facility]  Ariel Santamaria D.O.  [address] [place] [address]  Fax: [phone]   Authorization for Release/Disclosure of   Protected Health Information   Name: TRAVON MOONEY : 1950 SSN: [id number]   Address: [address] [place] [address] [address] [place] [address] Phone:    [phone] (home) [phone] (work)   I authorize the entity listed below to release/disclose the PHI below to:   [facility]/Ariel Santamaria D.O. and Ariel Santamaria D.O.   Provider or Entity Name:     Address   City, State, Zip   Phone:      Fax:     Reason for request: continuity of care   Information to be released:    [  ] LAST COLONOSCOPY,  including any PATH REPORT and follow-up  [  ] LAST FIT/COLOGUARD RESULT [  ] LAST DEXA  [  ] LAST MAMMOGRAM  [  ] LAST PAP  [  ] LAST LABS [  ] RETINA EXAM REPORT  [  ] IMMUNIZATION RECORDS  [  ] Release all info      [  ] Check here and initial the line next to each item to release ALL health information INCLUDING  _____ Care and treatment for drug and / or alcohol abuse  _____ HIV testing, infection status, or AIDS  _____ Genetic Testing    DATES OF SERVICE OR TIME PERIOD TO BE DISCLOSED: _____________  I understand and acknowledge that:  * This Authorization may be revoked at any time by you in writing, except if your health information has already been used or disclosed.  * Your health information that will be used or disclosed as a result of you signing this authorization could be re-disclosed by the recipient. If this occurs, your re-disclosed health information may no longer be protected by State or Federal laws.  * You may refuse to sign this Authorization. Your refusal will not affect your ability to obtain treatment.  * This Authorization becomes effective upon signing and will  on (date) __________.      If no date is indicated, this Authorization will  one (1) year from the signature date.    Name: Travon Mooney  Signature: Date:   2024     PLEASE  FAX REQUESTED RECORDS BACK TO: [phone]

## 2024-04-12 NOTE — PROGRESS NOTES
Teaching Physician Attestation      Level of Participation    I have personally interviewed and examined the patient.  In addition, I discussed with the resident physician the patient's history, exam, assessment and plan in detail.  Topics listed in my addendum were the focus of the visit.  Healthcare maintenance was not addressed this visit unless listed as a topic in my addendum.  I agree with the plan as written along with the following additions/modifications:    HTN  -no sx.  125-140s.  Elevated sbp 150s here.  Cardiology following.

## 2024-04-12 NOTE — LETTER
[employer] [facility]  Ariel Santamaria D.O.  [address] [place] [address]  Fax: [phone]   Authorization for Release/Disclosure of   Protected Health Information   Name: TRAVON MOONEY : 1950 SSN: [id number]   Address: [address] [place] [address] [address] [place] [address] Phone:    [phone] (home) [phone] (work)   I authorize the entity listed below to release/disclose the PHI below to:   [facility]/Ariel Santamaria D.O. and Ariel Santamaria D.O.   Provider or Entity Name:     Address   City, State, Zip   Phone:      Fax:     Reason for request: continuity of care   Information to be released:    [  ] LAST COLONOSCOPY,  including any PATH REPORT and follow-up  [  ] LAST FIT/COLOGUARD RESULT [  ] LAST DEXA  [  ] LAST MAMMOGRAM  [  ] LAST PAP  [  ] LAST LABS [  ] RETINA EXAM REPORT  [  ] IMMUNIZATION RECORDS  [  ] Release all info      [  ] Check here and initial the line next to each item to release ALL health information INCLUDING  _____ Care and treatment for drug and / or alcohol abuse  _____ HIV testing, infection status, or AIDS  _____ Genetic Testing    DATES OF SERVICE OR TIME PERIOD TO BE DISCLOSED: _____________  I understand and acknowledge that:  * This Authorization may be revoked at any time by you in writing, except if your health information has already been used or disclosed.  * Your health information that will be used or disclosed as a result of you signing this authorization could be re-disclosed by the recipient. If this occurs, your re-disclosed health information may no longer be protected by State or Federal laws.  * You may refuse to sign this Authorization. Your refusal will not affect your ability to obtain treatment.  * This Authorization becomes effective upon signing and will  on (date) __________.      If no date is indicated, this Authorization will  one (1) year from the signature date.    Name: Travon Mooney  Signature: Date:   2024     PLEASE  FAX REQUESTED RECORDS BACK TO: [phone]

## 2024-04-12 NOTE — PROGRESS NOTES
Chief Complaint: Blood pressure management    Last Seen: 12/13/2023    History of Present Illness:   Dejan Mooney is a 73 y.o. male with PMH relevant for T2DM, hypertension, BPPV, insomnia, dyslipidemia  who presents with follow-up for blood pressure management.  Patient states that he has noticed his blood pressures at home have been ranging 125-140s.  He denies any chest pain, palpitations, shortness of breath, dizziness, or lightheadedness.  He was recently seen by his cardiologist in March and had his carvedilol dose increased.  He has not been experiencing any dizziness while on this increased dose.  However his blood pressure has still been outside of the goal range.  He does report that he has been stressed lately and has been working on 5 different projects.        Review of Systems:  Review of Systems   Constitutional:  Negative for chills and fever.   HENT:  Negative for congestion and sore throat.    Respiratory:  Negative for cough and shortness of breath.    Cardiovascular:  Negative for chest pain and palpitations.   Gastrointestinal:  Negative for abdominal pain, nausea and vomiting.   Genitourinary:  Negative for dysuria and hematuria.   Musculoskeletal:  Negative for back pain and myalgias.   Skin:  Negative for itching and rash.   Neurological:  Negative for dizziness and headaches.        Medications:     Current Outpatient Medications:     SSD, APPLY THIN LAYER TO WOUND EVERY DAY, PRN    zolpidem, 10 mg, Oral, HS PRN, Taking    atorvastatin, 80 mg, Oral, Nightly, Taking    carvedilol, 12.5 mg, Oral, BID WITH MEALS, Taking    ezetimibe, 10 mg, Oral, DAILY, Taking    hydroCHLOROthiazide, 25 mg, Oral, DAILY, Taking    irbesartan, 300 mg, Oral, DAILY, Taking    gabapentin, 800 mg, Oral, TID, Taking    Ozempic (1 MG/DOSE), 1 mg, Subcutaneous, Q7 DAYS, Taking    metFORMIN, TAKE 1 TABLET BY MOUTH IN THE MORNING AND 2 TABLETS IN THE EVENING, Taking    triamcinolone acetonide, ,  "Taking    FreeStyle Shabbir 14 Day Sensor, FREESTYLE SHABBIR 14 DAY SENSOR, Taking    BD Pen Needle Micro U/F, , Taking    insulin detemir, 30 Units, Subcutaneous, BID, Taking    aspirin, 81 mg, Oral, DAILY, Taking     Objective:  Vitals:   BP (!) 152/78 (BP Location: Right arm, Patient Position: Sitting, BP Cuff Size: Adult)   Pulse 72   Temp 36.4 °C (97.6 °F) (Temporal)   Ht 1.905 m (6' 3\")   Wt 107 kg (235 lb 6.4 oz)   SpO2 92%  Body mass index is 29.42 kg/m².    Physical Exam  Constitutional:       General: He is not in acute distress.     Appearance: Normal appearance.   HENT:      Head: Normocephalic and atraumatic.   Eyes:      Extraocular Movements: Extraocular movements intact.      Pupils: Pupils are equal, round, and reactive to light.   Cardiovascular:      Rate and Rhythm: Normal rate and regular rhythm.      Heart sounds: Normal heart sounds. No murmur heard.  Pulmonary:      Effort: No respiratory distress.      Breath sounds: Normal breath sounds.   Abdominal:      General: There is no distension.      Palpations: Abdomen is soft.   Neurological:      General: No focal deficit present.      Mental Status: He is oriented to person, place, and time.          Results:    Lab Results   Component Value Date/Time    CHOLSTRLTOT 90 (L) 06/29/2023 05:46 AM    CHOLSTRLTOT 144 06/17/2013 07:12 AM    LDL 71 06/17/2013 07:12 AM    HDL 35 (L) 06/29/2023 05:46 AM    HDL 31 (A) 06/17/2013 07:12 AM    TRIGLYCERIDE 84 06/29/2023 05:46 AM    TRIGLYCERIDE 211 (H) 06/17/2013 07:12 AM       Lab Results   Component Value Date/Time    SODIUM 143 06/29/2023 05:46 AM    SODIUM 135 07/07/2020 05:40 PM    POTASSIUM 4.9 06/29/2023 05:46 AM    POTASSIUM 4.0 07/07/2020 05:40 PM    CHLORIDE 104 06/29/2023 05:46 AM    CHLORIDE 97 07/07/2020 05:40 PM    CO2 26 06/29/2023 05:46 AM    CO2 21 07/07/2020 05:40 PM    GLUCOSE 106 (H) 06/29/2023 05:46 AM    GLUCOSE 181 (H) 07/07/2020 05:40 PM    BUN 31 (H) 06/29/2023 05:46 AM    BUN 29 " (H) 07/07/2020 05:40 PM    CREATININE 1.08 06/29/2023 05:46 AM    CREATININE 1.21 07/07/2020 05:40 PM    BUNCREATRAT 29 (H) 06/29/2023 05:46 AM     Lab Results   Component Value Date/Time    ALKPHOSPHAT 88 06/29/2023 05:46 AM    ALKPHOSPHAT 76 07/07/2020 05:40 PM    ASTSGOT 20 06/29/2023 05:46 AM    ASTSGOT 15 07/07/2020 05:40 PM    ALTSGPT 20 06/29/2023 05:46 AM    ALTSGPT 17 07/07/2020 05:40 PM    TBILIRUBIN 0.5 06/29/2023 05:46 AM    TBILIRUBIN 1.0 07/07/2020 05:40 PM        Lab Results   Component Value Date/Time    WBC 5.0 09/22/2021 09:01 AM    WBC 13.1 (H) 07/07/2020 05:40 PM    RBC 4.85 09/22/2021 09:01 AM    RBC 4.89 07/07/2020 05:40 PM    HEMOGLOBIN 14.6 09/22/2021 09:01 AM    HEMOGLOBIN 14.6 07/07/2020 05:40 PM    HEMATOCRIT 44.1 09/22/2021 09:01 AM    HEMATOCRIT 43.8 07/07/2020 05:40 PM    MCV 91 09/22/2021 09:01 AM    MCV 89.6 07/07/2020 05:40 PM    MCH 30.1 09/22/2021 09:01 AM    MCH 29.9 07/07/2020 05:40 PM    MCHC 33.1 09/22/2021 09:01 AM    MCHC 33.3 (L) 07/07/2020 05:40 PM    MPV 9.2 07/07/2020 05:40 PM    NEUTSPOLYS 88.40 (H) 07/07/2020 05:40 PM    LYMPHOCYTES 4.10 (L) 07/07/2020 05:40 PM    MONOCYTES 6.40 07/07/2020 05:40 PM    EOSINOPHILS 0.00 07/07/2020 05:40 PM    BASOPHILS 0.30 07/07/2020 05:40 PM    HYPOCHROMIA 1+ 06/17/2013 07:12 AM        Assessment and Plan:    #Hypertension  Following with cardiology (Dr. Trujillo).  Recent visit on 3/4/2024 where his carvedilol dose was increased from 6.25 to 12.5 mg twice daily.  His blood pressures at home have continued to be ranging 125-140s.  BP today in clinic noted to be 148/79 and 152/78.  Patient is asymptomatic.  While on increased dose of carvedilol, patient denies any dizziness or lightheadedness.  - Reached out to Dr. Trujillo on uptitrating his carvedilol to 25 mg twice daily  - Continue irbesartan 300 mg daily and hydrochlorothiazide 25 mg daily  - CMP, urine microalbumin creatinine ratio  - DASH diet and exercise  - Alarm precautions  given    #Type 2 diabetes  Following with endocrinology (Dr. Durant).  Reported A1c 8.1% increased from 7.6%  Currently on metformin 500 TID, Levemir 30 units twice daily, and Ozempic 1 mg.  - Continue follow-up with endocrinology, records requested  - Patient is scheduling visit with optometry for diabetic retinopathy screening  - Urine microalbumin creatinine ratio ordered    #CAD  #Dyslipidemia  Following with cardiology. Per cardiology, goal LDL less than 55  - Continue aspirin 81 mg daily  - Continue atorvastatin 80 mg daily and Zetia 10 mg daily  - Repeat lipid profile ordered    #Neuropathy  Vitamin B12 within normal limits.  Likely etiology is diabetes.  He is following with pain management and is controlled on gabapentin 800 mg 3 times daily    #Insomnia  Following pain management on [facility]    Healthcare Maintenance  - Vaccinations: Recommended shingles vaccine  - Screenings: Patient reports getting colonoscopy from digestive health (records requested)  - Mental Health: Stable  - Preventative Care: Counseled patient on benefits of healthy diet and consistent exercise    Follow Up:  Return in about 4 weeks (around 5/10/2024).

## 2024-04-12 NOTE — PROGRESS NOTES
Teaching Physician Attestation      Level of Participation    I have personally interviewed and examined the patient.  In addition, I discussed with the resident physician the patient's history, exam, assessment and plan in detail.  Topics listed in my addendum were the focus of the visit.  Healthcare maintenance was not addressed this visit unless listed as a topic in my addendum.  I agree with the plan as written along with the following additions/modifications:    HTN  -no sx.  125-140s.  Elevated sbp 150s here.  Cardiology following.  -reach out to cardio to d/w uptitration of coreg, appreciate support    Rtc 1 mo annual exam

## 2024-04-15 DIAGNOSIS — R93.1 ELEVATED CORONARY ARTERY CALCIUM SCORE: ICD-10-CM

## 2024-04-15 DIAGNOSIS — I10 PRIMARY HYPERTENSION: ICD-10-CM

## 2024-04-15 RX ORDER — CARVEDILOL 25 MG/1
25 TABLET ORAL 2 TIMES DAILY WITH MEALS
Qty: 60 TABLET | Refills: 2 | Status: SHIPPED | OUTPATIENT
Start: 2024-04-15

## 2024-05-06 ENCOUNTER — APPOINTMENT (RX ONLY)
Dept: URBAN - METROPOLITAN AREA CLINIC 35 | Facility: CLINIC | Age: 74
Setting detail: DERMATOLOGY
End: 2024-05-06

## 2024-05-06 DIAGNOSIS — I87.2 VENOUS INSUFFICIENCY (CHRONIC) (PERIPHERAL): ICD-10-CM | Status: INADEQUATELY CONTROLLED

## 2024-05-06 DIAGNOSIS — L0390 CELLULITIS AND ABSCESS OF UNSPECIFIED SITES: ICD-10-CM

## 2024-05-06 DIAGNOSIS — L72.11 PILAR CYST: ICD-10-CM

## 2024-05-06 DIAGNOSIS — Z71.89 OTHER SPECIFIED COUNSELING: ICD-10-CM

## 2024-05-06 DIAGNOSIS — I80 PHLEBITIS AND THROMBOPHLEBITIS: ICD-10-CM | Status: INADEQUATELY CONTROLLED

## 2024-05-06 DIAGNOSIS — L82.1 OTHER SEBORRHEIC KERATOSIS: ICD-10-CM

## 2024-05-06 DIAGNOSIS — L57.0 ACTINIC KERATOSIS: ICD-10-CM

## 2024-05-06 DIAGNOSIS — L0391 CELLULITIS AND ABSCESS OF UNSPECIFIED SITES: ICD-10-CM

## 2024-05-06 DIAGNOSIS — D22 MELANOCYTIC NEVI: ICD-10-CM

## 2024-05-06 DIAGNOSIS — L81.4 OTHER MELANIN HYPERPIGMENTATION: ICD-10-CM

## 2024-05-06 PROBLEM — L08.9 LOCAL INFECTION OF THE SKIN AND SUBCUTANEOUS TISSUE, UNSPECIFIED: Status: ACTIVE | Noted: 2024-05-06

## 2024-05-06 PROBLEM — L30.9 DERMATITIS, UNSPECIFIED: Status: ACTIVE | Noted: 2024-05-06

## 2024-05-06 PROBLEM — D22.61 MELANOCYTIC NEVI OF RIGHT UPPER LIMB, INCLUDING SHOULDER: Status: ACTIVE | Noted: 2024-05-06

## 2024-05-06 PROCEDURE — ? MEDICATION COUNSELING

## 2024-05-06 PROCEDURE — ? TREATMENT REGIMEN

## 2024-05-06 PROCEDURE — ? COUNSELING

## 2024-05-06 PROCEDURE — ? ADDITIONAL NOTES

## 2024-05-06 PROCEDURE — ? ORDER ULTRASOUND

## 2024-05-06 PROCEDURE — 17003 DESTRUCT PREMALG LES 2-14: CPT

## 2024-05-06 PROCEDURE — 99214 OFFICE O/P EST MOD 30 MIN: CPT | Mod: 25

## 2024-05-06 PROCEDURE — ? LIQUID NITROGEN

## 2024-05-06 PROCEDURE — ? ORDER FOR PHOTODYNAMIC THERAPY

## 2024-05-06 PROCEDURE — 17000 DESTRUCT PREMALG LESION: CPT

## 2024-05-06 PROCEDURE — ? PRESCRIPTION

## 2024-05-06 RX ORDER — CLOBETASOL PROPIONATE 0.5 MG/G
THIN LAYER CREAM TOPICAL BID
Qty: 60 | Refills: 2 | Status: ERX | COMMUNITY
Start: 2024-05-06

## 2024-05-06 RX ORDER — DOXYCYCLINE HYCLATE 100 MG/1
1 CAPSULE, GELATIN COATED ORAL BID
Qty: 60 | Refills: 0 | Status: ERX | COMMUNITY
Start: 2024-05-06

## 2024-05-06 RX ADMIN — DOXYCYCLINE HYCLATE 1: 100 CAPSULE, GELATIN COATED ORAL at 00:00

## 2024-05-06 RX ADMIN — CLOBETASOL PROPIONATE THIN LAYER: 0.5 CREAM TOPICAL at 00:00

## 2024-05-06 ASSESSMENT — LOCATION DETAILED DESCRIPTION DERM
LOCATION DETAILED: RIGHT RADIAL DORSAL HAND
LOCATION DETAILED: RIGHT POSTERIOR SHOULDER
LOCATION DETAILED: RIGHT LATERAL TRAPEZIAL NECK
LOCATION DETAILED: LEFT DISTAL PRETIBIAL REGION
LOCATION DETAILED: RIGHT SUPERIOR PARIETAL SCALP
LOCATION DETAILED: RIGHT DISTAL DORSAL FOREARM
LOCATION DETAILED: LEFT DISTAL RADIAL DORSAL FOREARM
LOCATION DETAILED: LEFT DISTAL DORSAL FOREARM
LOCATION DETAILED: LEFT RADIAL DORSAL HAND
LOCATION DETAILED: LEFT ANTERIOR MEDIAL PROXIMAL THIGH
LOCATION DETAILED: RIGHT PROXIMAL PRETIBIAL REGION

## 2024-05-06 ASSESSMENT — LOCATION SIMPLE DESCRIPTION DERM
LOCATION SIMPLE: RIGHT PRETIBIAL REGION
LOCATION SIMPLE: POSTERIOR NECK
LOCATION SIMPLE: SCALP
LOCATION SIMPLE: LEFT THIGH
LOCATION SIMPLE: RIGHT FOREARM
LOCATION SIMPLE: LEFT FOREARM
LOCATION SIMPLE: RIGHT SHOULDER
LOCATION SIMPLE: RIGHT HAND
LOCATION SIMPLE: LEFT HAND
LOCATION SIMPLE: LEFT PRETIBIAL REGION

## 2024-05-06 ASSESSMENT — LOCATION ZONE DERM
LOCATION ZONE: SCALP
LOCATION ZONE: NECK
LOCATION ZONE: LEG
LOCATION ZONE: ARM
LOCATION ZONE: HAND

## 2024-05-20 ENCOUNTER — OFFICE VISIT (OUTPATIENT)
Dept: INTERNAL MEDICINE | Facility: OTHER | Age: 74
End: 2024-05-20
Payer: MEDICARE

## 2024-05-20 VITALS
WEIGHT: 228 LBS | HEIGHT: 74 IN | DIASTOLIC BLOOD PRESSURE: 72 MMHG | OXYGEN SATURATION: 98 % | TEMPERATURE: 97 F | SYSTOLIC BLOOD PRESSURE: 118 MMHG | HEART RATE: 70 BPM | BODY MASS INDEX: 29.26 KG/M2

## 2024-05-20 DIAGNOSIS — K21.9 GASTROESOPHAGEAL REFLUX DISEASE WITHOUT ESOPHAGITIS: ICD-10-CM

## 2024-05-20 DIAGNOSIS — R10.9 ABDOMINAL PAIN, UNSPECIFIED ABDOMINAL LOCATION: ICD-10-CM

## 2024-05-20 DIAGNOSIS — E11.41 TYPE 2 DIABETES MELLITUS WITH DIABETIC MONONEUROPATHY, WITH LONG-TERM CURRENT USE OF INSULIN (HCC): ICD-10-CM

## 2024-05-20 DIAGNOSIS — R19.7 DIARRHEA DUE TO MALABSORPTION: ICD-10-CM

## 2024-05-20 DIAGNOSIS — E16.2 HYPOGLYCEMIA: ICD-10-CM

## 2024-05-20 DIAGNOSIS — E78.5 DYSLIPIDEMIA: ICD-10-CM

## 2024-05-20 DIAGNOSIS — Z79.4 TYPE 2 DIABETES MELLITUS WITH DIABETIC MONONEUROPATHY, WITH LONG-TERM CURRENT USE OF INSULIN (HCC): ICD-10-CM

## 2024-05-20 DIAGNOSIS — I10 PRIMARY HYPERTENSION: ICD-10-CM

## 2024-05-20 DIAGNOSIS — K90.9 DIARRHEA DUE TO MALABSORPTION: ICD-10-CM

## 2024-05-20 PROCEDURE — 3078F DIAST BP <80 MM HG: CPT | Mod: GC

## 2024-05-20 PROCEDURE — 3074F SYST BP LT 130 MM HG: CPT | Mod: GC

## 2024-05-20 PROCEDURE — 99214 OFFICE O/P EST MOD 30 MIN: CPT | Mod: GC

## 2024-05-20 ASSESSMENT — ENCOUNTER SYMPTOMS
MYALGIAS: 0
HEADACHES: 0
ABDOMINAL PAIN: 1
SHORTNESS OF BREATH: 0
CHILLS: 0
COUGH: 0
PALPITATIONS: 0
SORE THROAT: 0
DIARRHEA: 1
VOMITING: 0
BACK PAIN: 0
FEVER: 0
NAUSEA: 1
DIZZINESS: 0

## 2024-05-20 NOTE — PROGRESS NOTES
Chief Complaint: Diarrhea    Last Seen: 4/12/2024    History of Present Illness:   Dejan Mooney is a 73 y.o. male with PMH relevant for type 2 diabetes, hypertension, BPPV, insomnia, dyslipidemia who presents with diarrhea onset 6 months ago.  Patient states that has been having intermittent diarrhea with associated abdominal pain, nausea, and foul-smelling stools.  He has not weighed himself at home, but he does note that he has a low appetite and has not been eating his evening meals.  His wife states that he would normally get diarrhea after eating chicken and eggs.  He denies any fevers, chills, bloody stool, vomiting, chest pain, or shortness of breath.  He states that his brother and niece were both diagnosed with celiac disease.  His brother was diagnosed in his 70s.        Review of Systems:  Review of Systems   Constitutional:  Negative for chills and fever.   HENT:  Negative for congestion and sore throat.    Respiratory:  Negative for cough and shortness of breath.    Cardiovascular:  Negative for chest pain and palpitations.   Gastrointestinal:  Positive for abdominal pain, diarrhea and nausea. Negative for vomiting.   Genitourinary:  Negative for dysuria and hematuria.   Musculoskeletal:  Negative for back pain and myalgias.   Skin:  Negative for itching and rash.   Neurological:  Negative for dizziness and headaches.        Medications:     Current Outpatient Medications:     carvedilol, 25 mg, Oral, BID WITH MEALS, Taking    SSD, APPLY THIN LAYER TO WOUND EVERY DAY, Taking    zolpidem, 10 mg, Oral, HS PRN, PRN    atorvastatin, 80 mg, Oral, Nightly, Taking    ezetimibe, 10 mg, Oral, DAILY, Taking    hydroCHLOROthiazide, 25 mg, Oral, DAILY, Taking    irbesartan, 300 mg, Oral, DAILY, Taking    gabapentin, 800 mg, Oral, TID, Taking    Ozempic (1 MG/DOSE), 1 mg, Subcutaneous, Q7 DAYS, Taking    metFORMIN, TAKE 1 TABLET BY MOUTH IN THE MORNING AND 2 TABLETS IN THE EVENING, Taking     "FreeStyle Shabbir 14 Day Sensor, FREESTYLE SHABBIR 14 DAY SENSOR, Taking    BD Pen Needle Micro U/F, , Taking    insulin detemir, 15 Units, Subcutaneous, QAM AC, Taking    aspirin, 81 mg, Oral, DAILY, Taking     Objective:  Vitals:   /72 (BP Location: Left arm, Patient Position: Sitting, BP Cuff Size: Adult)   Pulse 70   Temp 36.1 °C (97 °F) (Temporal)   Ht 1.88 m (6' 2\")   Wt 103 kg (228 lb)   SpO2 98%  Body mass index is 29.27 kg/m².    Physical Exam  Constitutional:       Appearance: Normal appearance.   HENT:      Head: Normocephalic and atraumatic.   Eyes:      Extraocular Movements: Extraocular movements intact.      Pupils: Pupils are equal, round, and reactive to light.   Cardiovascular:      Rate and Rhythm: Normal rate and regular rhythm.   Pulmonary:      Effort: No respiratory distress.      Breath sounds: Normal breath sounds.   Abdominal:      Palpations: Abdomen is soft.      Tenderness: There is abdominal tenderness.   Neurological:      General: No focal deficit present.      Mental Status: He is alert and oriented to person, place, and time.          Results:    Lab Results   Component Value Date/Time    CHOLSTRLTOT 74 (L) 05/16/2024 07:35 AM    CHOLSTRLTOT 144 06/17/2013 07:12 AM    LDL 71 06/17/2013 07:12 AM    HDL 33 (L) 05/16/2024 07:35 AM    HDL 31 (A) 06/17/2013 07:12 AM    TRIGLYCERIDE 66 05/16/2024 07:35 AM    TRIGLYCERIDE 211 (H) 06/17/2013 07:12 AM       Lab Results   Component Value Date/Time    SODIUM 142 05/16/2024 07:35 AM    SODIUM 135 07/07/2020 05:40 PM    POTASSIUM 4.7 05/16/2024 07:35 AM    POTASSIUM 4.0 07/07/2020 05:40 PM    CHLORIDE 102 05/16/2024 07:35 AM    CHLORIDE 97 07/07/2020 05:40 PM    CO2 26 05/16/2024 07:35 AM    CO2 21 07/07/2020 05:40 PM    GLUCOSE 115 (H) 05/16/2024 07:35 AM    GLUCOSE 181 (H) 07/07/2020 05:40 PM    BUN 33 (H) 05/16/2024 07:35 AM    BUN 29 (H) 07/07/2020 05:40 PM    CREATININE 0.77 05/16/2024 07:35 AM    CREATININE 1.21 07/07/2020 05:40 PM "    BUNCREATRAT 43 (H) 05/16/2024 07:35 AM     Lab Results   Component Value Date/Time    ALKPHOSPHAT 113 05/16/2024 07:35 AM    ALKPHOSPHAT 76 07/07/2020 05:40 PM    ASTSGOT 16 05/16/2024 07:35 AM    ASTSGOT 15 07/07/2020 05:40 PM    ALTSGPT 13 05/16/2024 07:35 AM    ALTSGPT 17 07/07/2020 05:40 PM    TBILIRUBIN 0.3 05/16/2024 07:35 AM    TBILIRUBIN 1.0 07/07/2020 05:40 PM        Lab Results   Component Value Date/Time    WBC 5.0 09/22/2021 09:01 AM    WBC 13.1 (H) 07/07/2020 05:40 PM    RBC 4.85 09/22/2021 09:01 AM    RBC 4.89 07/07/2020 05:40 PM    HEMOGLOBIN 14.6 09/22/2021 09:01 AM    HEMOGLOBIN 14.6 07/07/2020 05:40 PM    HEMATOCRIT 44.1 09/22/2021 09:01 AM    HEMATOCRIT 43.8 07/07/2020 05:40 PM    MCV 91 09/22/2021 09:01 AM    MCV 89.6 07/07/2020 05:40 PM    MCH 30.1 09/22/2021 09:01 AM    MCH 29.9 07/07/2020 05:40 PM    MCHC 33.1 09/22/2021 09:01 AM    MCHC 33.3 (L) 07/07/2020 05:40 PM    MPV 9.2 07/07/2020 05:40 PM    NEUTSPOLYS 88.40 (H) 07/07/2020 05:40 PM    LYMPHOCYTES 4.10 (L) 07/07/2020 05:40 PM    MONOCYTES 6.40 07/07/2020 05:40 PM    EOSINOPHILS 0.00 07/07/2020 05:40 PM    BASOPHILS 0.30 07/07/2020 05:40 PM    HYPOCHROMIA 1+ 06/17/2013 07:12 AM        Assessment and Plan:    #Diarrhea  #Abdominal pain  Patient reporting intermittent diarrhea for the past 6 months with associated abdominal pain, nausea, foul-smelling stools, and loss of appetite.  Patient has had about a 7 pound weight loss in the past month.  Reports family history of celiac's with his brother being diagnosed in his 70s.  Differentials include celiac's, IBS, IBD, malignancy, or Ozempic side effect.  Last colonoscopy in 2021 showed 1 diminutive sessile polyp in the rectosigmoid colon with recommendation for repeat in 5 to 10 years.  If workup is ultimately negative and patient continues to have continued symptoms and significant weight loss, will pursue further evaluation with possible EGD.  -Calprotectin, fecal lactoferrin, stool  cultures  - H. pylori breath test  - TTG antibody  - Gastric emptying study    #hypoglycemia  #T2DM  Following with endocrinology (Dr. Durant).  A1c 7% down from 8.6% 2 months ago.  Patient reporting hypoglycemic episodes with sugars in the 50s multiple times.  He attempted to decrease his Levemir to 15 units once in the morning and continued to have hypoglycemic episodes.  He states that he has not been eating much in the evenings recently.  - Recommended to hold off on Levemir and observe his blood sugars.  If his sugars are increasing, advised to slowly reintroduce Levemir at 5 units daily with incremental increases of 2 units at a time.  -Continue metformin 500 mg 3 times daily and Ozempic 1 mg weekly  - Patient reports his endocrinologist is in the process of moving offices and will follow-up as soon as he can  -Follow-up in 2 weeks for monitoring of his sugars    Chronic conditions not fully addressed at this visit  #Hypertension  Following with cardiology (Dr. Trujillo).  Blood pressures have been well-controlled.  BP today in clinic is 118/72.    -Continue current regimen of carvedilol 25 mg twice daily, irbesartan 300 mg daily, and hydrochlorothiazide 25 mg daily     #CAD  #Dyslipidemia  Following with cardiology. Per cardiology, goal LDL less than 55  - Continue aspirin 81 mg daily  - Continue atorvastatin 80 mg daily and Zetia 10 mg daily     #Neuropathy  Following with pain management and controlling gabapentin 100 mg 3 times daily     #Insomnia  Following pain management on Ambien    Follow Up:  Return in about 2 weeks (around 6/3/2024).  For diabetes management

## 2024-05-22 ENCOUNTER — APPOINTMENT (RX ONLY)
Dept: URBAN - METROPOLITAN AREA CLINIC 35 | Facility: CLINIC | Age: 74
Setting detail: DERMATOLOGY
End: 2024-05-22

## 2024-05-22 DIAGNOSIS — I80 PHLEBITIS AND THROMBOPHLEBITIS: ICD-10-CM | Status: IMPROVED

## 2024-05-22 DIAGNOSIS — I87.2 VENOUS INSUFFICIENCY (CHRONIC) (PERIPHERAL): ICD-10-CM | Status: IMPROVED

## 2024-05-22 PROBLEM — L30.9 DERMATITIS, UNSPECIFIED: Status: ACTIVE | Noted: 2024-05-22

## 2024-05-22 PROCEDURE — ? TREATMENT REGIMEN

## 2024-05-22 PROCEDURE — ? ADDITIONAL NOTES

## 2024-05-22 PROCEDURE — 99213 OFFICE O/P EST LOW 20 MIN: CPT

## 2024-05-22 PROCEDURE — ? COUNSELING

## 2024-05-22 ASSESSMENT — LOCATION DETAILED DESCRIPTION DERM
LOCATION DETAILED: RIGHT PROXIMAL PRETIBIAL REGION
LOCATION DETAILED: LEFT PROXIMAL PRETIBIAL REGION

## 2024-05-22 ASSESSMENT — LOCATION SIMPLE DESCRIPTION DERM
LOCATION SIMPLE: LEFT PRETIBIAL REGION
LOCATION SIMPLE: RIGHT PRETIBIAL REGION

## 2024-05-22 ASSESSMENT — LOCATION ZONE DERM: LOCATION ZONE: LEG

## 2024-06-12 ENCOUNTER — OFFICE VISIT (OUTPATIENT)
Dept: INTERNAL MEDICINE | Facility: OTHER | Age: 74
End: 2024-06-12
Payer: MEDICARE

## 2024-06-12 VITALS
TEMPERATURE: 97.7 F | SYSTOLIC BLOOD PRESSURE: 130 MMHG | WEIGHT: 227.6 LBS | HEIGHT: 74 IN | DIASTOLIC BLOOD PRESSURE: 76 MMHG | BODY MASS INDEX: 29.21 KG/M2 | OXYGEN SATURATION: 96 % | HEART RATE: 87 BPM

## 2024-06-12 DIAGNOSIS — R19.7 DIARRHEA DUE TO MALABSORPTION: ICD-10-CM

## 2024-06-12 DIAGNOSIS — K90.9 DIARRHEA DUE TO MALABSORPTION: ICD-10-CM

## 2024-06-12 DIAGNOSIS — Z79.4 TYPE 2 DIABETES MELLITUS WITH DIABETIC MONONEUROPATHY, WITH LONG-TERM CURRENT USE OF INSULIN (HCC): ICD-10-CM

## 2024-06-12 DIAGNOSIS — E11.41 TYPE 2 DIABETES MELLITUS WITH DIABETIC MONONEUROPATHY, WITH LONG-TERM CURRENT USE OF INSULIN (HCC): ICD-10-CM

## 2024-06-12 PROCEDURE — 99214 OFFICE O/P EST MOD 30 MIN: CPT | Mod: GC | Performed by: STUDENT IN AN ORGANIZED HEALTH CARE EDUCATION/TRAINING PROGRAM

## 2024-06-12 PROCEDURE — 3078F DIAST BP <80 MM HG: CPT | Mod: GC | Performed by: STUDENT IN AN ORGANIZED HEALTH CARE EDUCATION/TRAINING PROGRAM

## 2024-06-12 PROCEDURE — 3075F SYST BP GE 130 - 139MM HG: CPT | Mod: GC | Performed by: STUDENT IN AN ORGANIZED HEALTH CARE EDUCATION/TRAINING PROGRAM

## 2024-06-12 RX ORDER — SEMAGLUTIDE 1.34 MG/ML
1 INJECTION, SOLUTION SUBCUTANEOUS
Qty: 1.5 ML | Refills: 2 | Status: SHIPPED | OUTPATIENT
Start: 2024-06-12

## 2024-06-12 NOTE — PROGRESS NOTES
Established Patient    Patient Care Team:  Ariel Santamaria D.O. as PCP - General (Internal Medicine)  Jamie Pittman M.D. (Endocrinology)  Walter Trujillo M.D. as Cardiologist (Cardiovascular Disease (Cardiology))    Dejan Mooney is a 73 y.o. male who presents today with the following Chief Complaint(s): Follow up for Diagnoses of Type 2 diabetes mellitus with diabetic mononeuropathy, with long-term current use of insulin (HCC) and Diarrhea due to malabsorption were pertinent to this visit.    HPI:  1. Type 2 diabetes mellitus with diabetic mononeuropathy, with long-term current use of insulin (HCC)  Previous patient of Dr. Durant, will need new endocrinologist will follow with us for refills in the meantime, previous visit on 5/20 with concern for hypoglycemia, patient feels that, was waking up in the night and hypoglycemic has backed off nocturnal Tresiba now taking 15 to 20 units nightly still on Ozempic and metformin, tolerating therapy well, on gabapentin which seems to help with his neuropathy.  Endorses good compliance.  14 point review of systems negative for as above below.  Follows with Dr. Meza for chronic toe wounds secondary to hammertoes and neuropathy.i    2. Diarrhea due to malabsorption  Occasional twice a week diarrhea, [place] stool 5-6, urgency, no stool caliber changes no melena, or hematochezia.  Discussed with Dr. Santmaaria at previous visit, has not had labs ordered yet, did not receive ran out, wishes, diarrhea has been going on for approximately 3 years, predates Ozempic.      ROS:     General: No fevers, chills, night sweats, weight loss or gain  HEENT: No hearing changes, vision changes, eye pain, ear pain, nasal discharge, sore throat  Neck: No swelling in neck  Pulmonary: No shortness of breath, cough, sputum, or hemoptysis  Cardiovascular: No chest pain, palpitations, or LE swelling  GI: No nausea, vomiting, diarrhea, constipation, abdominal pain, hematochezia or  melena  : No dysuria or frequency  Neuro: No focal weakness, no general weakness, no headaches, no lightheadedness, no dizziness  Psych: No anxiety or depression    Past Medical History:   Diagnosis Date    BBB (bundle branch block)     Diabetes     insulin and oral medication    Heart burn     Hyperlipidemia     Hypertension     Indigestion     Pain     knee, shoulder    Pneumonia 1974    Psychiatric problem     depression    Renal calculus 7/2013    kidney stones    Skin rash 3/16/2022    Snoring     sleep apnea questionairre completed     Social History     Tobacco Use    Smoking status: Never    Smokeless tobacco: Never   Vaping Use    Vaping status: Never Used   Substance Use Topics    Alcohol use: Yes     Alcohol/week: 1.2 oz     Types: 2 Standard drinks or equivalent per week     Comment: 1x/week    Drug use: No     Current Outpatient Medications   Medication Sig Dispense Refill    Semaglutide, 1 MG/DOSE, (OZEMPIC, 1 MG/DOSE,) 2 MG/1.5ML Solution Pen-injector Inject 1 mg under the skin every 7 days. 1.5 mL 2    metFORMIN (GLUCOPHAGE) 500 MG Tab TAKE 1 TABLET BY MOUTH IN THE MORNING AND 2 TABLETS IN THE EVENING 270 Tablet 0    carvedilol (COREG) 25 MG Tab Take 1 Tablet by mouth 2 times a day with meals. 60 Tablet 2    SSD 1 % Cream APPLY THIN LAYER TO WOUND EVERY DAY      zolpidem (AMBIEN) 10 MG Tab Take 10 mg by mouth at bedtime as needed for Sleep.      atorvastatin (LIPITOR) 80 MG tablet Take 1 Tablet by mouth every evening. 100 Tablet 3    ezetimibe (ZETIA) 10 MG Tab Take 1 Tablet by mouth every day. 100 Tablet 3    hydroCHLOROthiazide 25 MG Tab Take 1 Tablet by mouth every day. 100 Tablet 3    irbesartan (AVAPRO) 300 MG Tab Take 1 Tablet by mouth every day. 100 Tablet 3    gabapentin (NEURONTIN) 800 MG tablet Take 1 Tablet by mouth 3 times a day. 90 Tablet 2    Continuous Blood Gluc Sensor (FREESTYLE HANH 14 DAY SENSOR) Misc FREESTYLE HANH 14 DAY SENSOR      BD PEN NEEDLE MICRO U/F 32G X 6 MM Misc     "   insulin detemir (LEVEMIR) 100 UNIT/ML Solution Inject 15 Units under the skin every morning before breakfast.      ASPIRIN 81 MG PO TABS Take 81 mg by mouth every day.       No current facility-administered medications for this visit.       Physical Exam:  /76 (BP Location: Left arm, Patient Position: Sitting, BP Cuff Size: Adult)   Pulse 87   Temp 36.5 °C (97.7 °F) (Temporal)   Ht 1.88 m (6' 2\")   Wt 103 kg (227 lb 9.6 oz)   SpO2 96%   BMI 29.22 kg/m²   General: Well developed, well nourished male, in no distress.  HEENT: NC/AT, PERRL, EOMI, no scleral icterus or conjunctival pallor, fair dentition/denture in, no nasal discharge or oral erythema or exudates.   Neck: Supple, No cervical or supraclavicular LAD  CV:RRR, no murmurs gallops or rubs, no JVD  Pulm: LCAB, no crackles, rales, rhonchi, or wheezing  GI: Normal bowel sounds, abdomen soft, nontender, nondistended to deep or light palpation in all 4 quadrants, no HSM.  MSK: Radial and dorsalis pedis pulses 2+ and equal bilaterally, respectively.  Strength 5 out of 5 in upper and lower extremities.  No lower extremity edema, bilateral big toe ulcers on plantar aspect of large toe, well-healing.  Neuro: Patient is alert and oriented x3, no focal deficits  Psych: Appropriate mood and affect       Assessment and Plan:   1. Type 2 diabetes mellitus with diabetic mononeuropathy, with long-term current use of insulin (HCC)  Longstanding history of type 2 diabetes mellitus with insulin use complicated by diabetic neuropathy and microalbuminuria, previous patient of Dr. Durant, had recent low-dose which have since abated since stopped on nightly Tresiba, patient takes between 15 and 20 units of Tresiba daily depending on meal range blood glucose on freestyle jocelyne have been between 80 and 190 with most times been in the range.  No further lows.  - Well controlled  - Current regimen: Tresiba 15 units nightly, metformin 1500 mg daily, semaglutide 1 mg " weekly  - Denies polyuria/ polydipsia, weight loss, changes in vision, numbness/tingling, ulcerations or poor healing wounds.  - Inspects feet daily Y monofilament per PCP at next visit   - Last hemoglobin A1c was 7.0 in 5/24. Next due now (every 90 days)  - Last lipid profile 2031(TC: 211, LDL 71). Next due now on statin  - Urine micro-albumin creatinine ratio 601 in 2011.  Request records from Dr. Durant's office on last microalbumin to creatinine ratio.  On ACE/ARB Y  - Following up with ophthalmology regular  - Follows with dentist regularly  - Has received Prevnar 20/23/13  -Diabetic education referral offered/given   -Podiatry referral offered/given    - Semaglutide, 1 MG/DOSE, (OZEMPIC, 1 MG/DOSE,) 2 MG/1.5ML Solution Pen-injector; Inject 1 mg under the skin every 7 days.  Dispense: 1.5 mL; Refill: 2  - metFORMIN (GLUCOPHAGE) 500 MG Tab; TAKE 1 TABLET BY MOUTH IN THE MORNING AND 2 TABLETS IN THE EVENING  Dispense: 270 Tablet; Refill: 0  - Referral to Endocrinology  - HEMOGLOBIN A1C; Future  -Keep blood pressure log every day follows with Denis Trujillo who is leaving soon, will follow-up with cardiology.  -Bilateral toe wounds follows with wound care and podiatry.    2. Diarrhea due to malabsorption  Family history of celiac disease at later age, some sensitivity to age, likely secondary to malabsorption secondary to delayed sugar absorption  - Encourage FODMAP diet  - Will screen for celiac with celiac panel as below, also rule out iron deficiency anemia, overall relatively low concern, discussed with patient has not  - CELIAC DISEASE AB SCREEN W/RFX  - IRON/TOTAL IRON BIND; Future  - CBC WITHOUT DIFFERENTIAL; Future  - FERRITIN; Future      Return in about 4 weeks (around 7/10/2024).    Patient Instructions   Thank you for visiting today!  Please follow-up with Dr. Santamaria   Please try some psyllium husk, one scoop and drink a bottle of water to ensure proper fiber intake.   Try the FODMap diet   We will send a  new referral to Endocrinology, but our office will take over medication until we get you in to see a new endocrinologist or Dr. Durant can get you back in.   If you use [facility] for imaging, the order should be sent and you can call [facility] [phone] for scheduling.  Please get lab work done at least 5 days before next visit.  Please try and eat healthy, get at least 30 minutes of cardiovascular exercise a day to help keep your health as best as it can be.  If you have any questions or concerns please feel free to contact us at [phone].  If you feel like you are experiencing a medical emergency please seek immediate medical attention at an urgent care or in the emergency department.       Candelaria Pickett M.D. PGY 3  [facility]    This note was created using voice recognition software.  While every attempt is made to ensure accuracy of transcription, occasionally errors occur.

## 2024-06-12 NOTE — PATIENT INSTRUCTIONS
Thank you for visiting today!  Please follow-up with Dr. Santamaria   Please try some psyllium husk, one scoop and drink a bottle of water to ensure proper fiber intake.   Try the FODMap diet   We will send a new referral to Endocrinology, but our office will take over medication until we get you in to see a new endocrinologist or Dr. Durant can get you back in.   If you use [facility] for imaging, the order should be sent and you can call [facility] [phone] for scheduling.  Please get lab work done at least 5 days before next visit.  Please try and eat healthy, get at least 30 minutes of cardiovascular exercise a day to help keep your health as best as it can be.  If you have any questions or concerns please feel free to contact us at [phone].  If you feel like you are experiencing a medical emergency please seek immediate medical attention at an urgent care or in the emergency department.

## 2024-07-29 DIAGNOSIS — I10 PRIMARY HYPERTENSION: ICD-10-CM

## 2024-07-29 RX ORDER — CARVEDILOL 25 MG/1
25 TABLET ORAL 2 TIMES DAILY WITH MEALS
Qty: 60 TABLET | Refills: 2 | Status: SHIPPED | OUTPATIENT
Start: 2024-07-29

## 2024-08-14 ENCOUNTER — OFFICE VISIT (OUTPATIENT)
Dept: INTERNAL MEDICINE | Facility: OTHER | Age: 74
End: 2024-08-14
Payer: MEDICARE

## 2024-08-14 VITALS
HEART RATE: 80 BPM | SYSTOLIC BLOOD PRESSURE: 117 MMHG | DIASTOLIC BLOOD PRESSURE: 70 MMHG | OXYGEN SATURATION: 96 % | BODY MASS INDEX: 29.18 KG/M2 | TEMPERATURE: 97.4 F | HEIGHT: 74 IN | WEIGHT: 227.4 LBS

## 2024-08-14 DIAGNOSIS — E78.5 DYSLIPIDEMIA: ICD-10-CM

## 2024-08-14 DIAGNOSIS — Z02.4 ENCOUNTER FOR EXAMINATION FOR DRIVING LICENSE: ICD-10-CM

## 2024-08-14 DIAGNOSIS — Z79.4 TYPE 2 DIABETES MELLITUS WITH DIABETIC MONONEUROPATHY, WITH LONG-TERM CURRENT USE OF INSULIN (HCC): ICD-10-CM

## 2024-08-14 DIAGNOSIS — R93.1 ELEVATED CORONARY ARTERY CALCIUM SCORE: ICD-10-CM

## 2024-08-14 DIAGNOSIS — E11.41 TYPE 2 DIABETES MELLITUS WITH DIABETIC MONONEUROPATHY, WITH LONG-TERM CURRENT USE OF INSULIN (HCC): ICD-10-CM

## 2024-08-14 DIAGNOSIS — I10 PRIMARY HYPERTENSION: ICD-10-CM

## 2024-08-14 PROCEDURE — 3078F DIAST BP <80 MM HG: CPT

## 2024-08-14 PROCEDURE — 3074F SYST BP LT 130 MM HG: CPT

## 2024-08-14 PROCEDURE — 99213 OFFICE O/P EST LOW 20 MIN: CPT | Mod: GE

## 2024-08-14 RX ORDER — HYDROCHLOROTHIAZIDE 25 MG/1
25 TABLET ORAL DAILY
Qty: 100 TABLET | Refills: 3 | Status: SHIPPED | OUTPATIENT
Start: 2024-08-14

## 2024-08-14 RX ORDER — ATORVASTATIN CALCIUM 80 MG/1
80 TABLET, FILM COATED ORAL NIGHTLY
Qty: 100 TABLET | Refills: 3 | Status: SHIPPED | OUTPATIENT
Start: 2024-08-14

## 2024-08-14 RX ORDER — INSULIN DEGLUDEC 100 U/ML
INJECTION, SOLUTION SUBCUTANEOUS
COMMUNITY

## 2024-08-14 RX ORDER — IRBESARTAN 300 MG/1
300 TABLET ORAL DAILY
Qty: 100 TABLET | Refills: 3 | Status: SHIPPED | OUTPATIENT
Start: 2024-08-14

## 2024-08-14 RX ORDER — EZETIMIBE 10 MG/1
10 TABLET ORAL DAILY
Qty: 100 TABLET | Refills: 3 | Status: SHIPPED | OUTPATIENT
Start: 2024-08-14

## 2024-08-14 ASSESSMENT — ENCOUNTER SYMPTOMS
SORE THROAT: 0
COUGH: 0
ABDOMINAL PAIN: 0
DIZZINESS: 0
BACK PAIN: 0
FEVER: 0
SHORTNESS OF BREATH: 0
MYALGIAS: 0
HEADACHES: 0
NAUSEA: 0
CHILLS: 0
VOMITING: 0
PALPITATIONS: 0

## 2024-08-14 NOTE — PROGRESS NOTES
Chief Complaint: Medication refills, DMV paperwork    Last Seen: 6/12/2024    History of Present Illness:   Dejan Mooney is a 73 y.o. male with PMH relevant for  type 2 diabetes, hypertension, BPPV, insomnia, dyslipidemia who presents with follow-up for medication refills and DMV paperwork.  Patient is requesting refills on irbesartan, hydrochlorothiazide, Zetia, and atorvastatin.  Patient is also requesting DMV paperwork to be filled out to drive.  He denies any spontaneous loss of consciousness or blacking out.  He has seen his optometrist already who will fill out his vision paperwork.      Past Medical History:   Diagnosis Date    BBB (bundle branch block)     Diabetes     insulin and oral medication    Heart burn     Hyperlipidemia     Hypertension     Indigestion     Pain     knee, shoulder    Pneumonia 1974    Psychiatric problem     depression    Renal calculus 7/2013    kidney stones    Skin rash 3/16/2022    Snoring     sleep apnea questionairre completed       Past Surgical History:   Procedure Laterality Date    RECOVERY  5/13/2014    Performed by [facility]-Recovery Surgery at SURGERY SAME DAY [facility]    OTHER  2/2014    right knee    KNEE ARTHROSCOPY  8/22/2013    Performed by Maurisio Alfaro M.D. at SURGERY [facility]S    MENISCECTOMY, KNEE, MEDIAL  8/22/2013    Performed by Maurisio Alfaro M.D. at SURGERY [facility] ORS    SHOULDER ARTHROTOMY  2003    right    KNEE ARTHROSCOPY  1990    left    KNEE ARTHROSCOPY  1985    right    ORIF, ANKLE  1984    left    ORIF, KNEE  1970    left    TONSILLECTOMY  as child       Family History   Problem Relation Age of Onset    Heart Disease Mother     Hypertension Mother     Heart Disease Father     Hypertension Father     Atrial fibrillation Brother        Social History     Socioeconomic History    Marital status:      Spouse name: Not on file    Number of children: Not on file    Years of education: Not on file     Highest education level: Not on file   Occupational History    Not on file   Tobacco Use    Smoking status: Never    Smokeless tobacco: Never   Vaping Use    Vaping status: Never Used   Substance and Sexual Activity    Alcohol use: Yes     Alcohol/week: 1.2 oz     Types: 2 Standard drinks or equivalent per week     Comment: 1x/week    Drug use: No    Sexual activity: Not on file   Other Topics Concern    Not on file   Social History Narrative    Not on file     Social Determinants of Health     Financial Resource Strain: Not on file   Food Insecurity: Not on file   Transportation Needs: Not on file   Physical Activity: Not on file   Stress: Not on file   Social Connections: Not on file   Intimate Partner Violence: Not on file   Housing Stability: Not on file       Current Outpatient Medications   Medication Sig Dispense Refill    Insulin Degludec (TRESIBA FLEXTOUCH) 100 UNIT/ML Solution Pen-injector       atorvastatin (LIPITOR) 80 MG tablet Take 1 Tablet by mouth every evening. 100 Tablet 3    ezetimibe (ZETIA) 10 MG Tab Take 1 Tablet by mouth every day. 100 Tablet 3    hydroCHLOROthiazide 25 MG Tab Take 1 Tablet by mouth every day. 100 Tablet 3    irbesartan (AVAPRO) 300 MG Tab Take 1 Tablet by mouth every day. 100 Tablet 3    carvedilol (COREG) 25 MG Tab Take 1 Tablet by mouth 2 times a day with meals. 60 Tablet 2    Semaglutide, 1 MG/DOSE, (OZEMPIC, 1 MG/DOSE,) 2 MG/1.5ML Solution Pen-injector Inject 1 mg under the skin every 7 days. 1.5 mL 2    metFORMIN (GLUCOPHAGE) 500 MG Tab TAKE 1 TABLET BY MOUTH IN THE MORNING AND 2 TABLETS IN THE EVENING 270 Tablet 0    SSD 1 % Cream APPLY THIN LAYER TO WOUND EVERY DAY      zolpidem (AMBIEN) 10 MG Tab Take 10 mg by mouth at bedtime as needed for Sleep.      gabapentin (NEURONTIN) 800 MG tablet Take 1 Tablet by mouth 3 times a day. 90 Tablet 2    Continuous Blood Gluc Sensor (FREESTYLE HANH 14 DAY SENSOR) Misc FREESTYLE HANH 14 DAY SENSOR      BD PEN NEEDLE MICRO U/F 32G  "X 6 MM Misc       ASPIRIN 81 MG PO TABS Take 81 mg by mouth every day.       No current facility-administered medications for this visit.       Allergies   Allergen Reactions    Penicillins Anaphylaxis, Hives, Swelling and Shortness of Breath    Norvasc [Amlodipine]      Leg swelling       Review of Systems:  Review of Systems   Constitutional:  Negative for chills and fever.   HENT:  Negative for congestion and sore throat.    Respiratory:  Negative for cough and shortness of breath.    Cardiovascular:  Negative for chest pain and palpitations.   Gastrointestinal:  Negative for abdominal pain, nausea and vomiting.   Genitourinary:  Negative for dysuria and hematuria.   Musculoskeletal:  Negative for back pain and myalgias.   Skin:  Negative for itching and rash.   Neurological:  Negative for dizziness and headaches.        Medications:     Current Outpatient Medications:     Tremukul Shuklauch, , Taking    atorvastatin, 80 mg, Oral, Nightly    ezetimibe, 10 mg, Oral, DAILY    hydroCHLOROthiazide, 25 mg, Oral, DAILY    irbesartan, 300 mg, Oral, DAILY    carvedilol, 25 mg, Oral, BID WITH MEALS, Taking    Ozempic (1 MG/DOSE), 1 mg, Subcutaneous, Q7 DAYS, Taking    metFORMIN, TAKE 1 TABLET BY MOUTH IN THE MORNING AND 2 TABLETS IN THE EVENING, Taking    SSD, APPLY THIN LAYER TO WOUND EVERY DAY, Taking    zolpidem, 10 mg, Oral, HS PRN, Taking    gabapentin, 800 mg, Oral, TID, Taking    FreeStyle Shabbir 14 Day Sensor, FREESTYLE SHABBIR 14 DAY SENSOR, Taking    BD Pen Needle Micro U/F, , Taking    aspirin, 81 mg, Oral, DAILY, Taking     Objective:  Vitals:   /70 (BP Location: Left arm, Patient Position: Sitting, BP Cuff Size: Adult)   Pulse 80   Temp 36.3 °C (97.4 °F) (Temporal)   Ht 1.88 m (6' 2\")   Wt 103 kg (227 lb 6.4 oz)   SpO2 96%  Body mass index is 29.2 kg/m².    Physical Exam  Constitutional:       General: He is not in acute distress.     Appearance: Normal appearance.   HENT:      Head: Normocephalic and " atraumatic.   Eyes:      Extraocular Movements: Extraocular movements intact.      Pupils: Pupils are equal, round, and reactive to light.   Cardiovascular:      Rate and Rhythm: Normal rate and regular rhythm.      Heart sounds: Normal heart sounds.   Pulmonary:      Effort: No respiratory distress.      Breath sounds: Normal breath sounds.   Abdominal:      General: There is no distension.      Palpations: Abdomen is soft.   Neurological:      General: No focal deficit present.      Mental Status: He is oriented to person, place, and time.          Results:    Lab Results   Component Value Date/Time    CHOLSTRLTOT 74 (L) 05/16/2024 07:35 AM    CHOLSTRLTOT 144 06/17/2013 07:12 AM    LDL 71 06/17/2013 07:12 AM    HDL 33 (L) 05/16/2024 07:35 AM    HDL 31 (A) 06/17/2013 07:12 AM    TRIGLYCERIDE 66 05/16/2024 07:35 AM    TRIGLYCERIDE 211 (H) 06/17/2013 07:12 AM       Lab Results   Component Value Date/Time    SODIUM 142 05/16/2024 07:35 AM    SODIUM 135 07/07/2020 05:40 PM    POTASSIUM 4.7 05/16/2024 07:35 AM    POTASSIUM 4.0 07/07/2020 05:40 PM    CHLORIDE 102 05/16/2024 07:35 AM    CHLORIDE 97 07/07/2020 05:40 PM    CO2 26 05/16/2024 07:35 AM    CO2 21 07/07/2020 05:40 PM    GLUCOSE 115 (H) 05/16/2024 07:35 AM    GLUCOSE 181 (H) 07/07/2020 05:40 PM    BUN 33 (H) 05/16/2024 07:35 AM    BUN 29 (H) 07/07/2020 05:40 PM    CREATININE 0.77 05/16/2024 07:35 AM    CREATININE 1.21 07/07/2020 05:40 PM    BUNCREATRAT 43 (H) 05/16/2024 07:35 AM     Lab Results   Component Value Date/Time    ALKPHOSPHAT 113 05/16/2024 07:35 AM    ALKPHOSPHAT 76 07/07/2020 05:40 PM    ASTSGOT 16 05/16/2024 07:35 AM    ASTSGOT 15 07/07/2020 05:40 PM    ALTSGPT 13 05/16/2024 07:35 AM    ALTSGPT 17 07/07/2020 05:40 PM    TBILIRUBIN 0.3 05/16/2024 07:35 AM    TBILIRUBIN 1.0 07/07/2020 05:40 PM        Lab Results   Component Value Date/Time    WBC 6.0 06/17/2024 08:05 AM    WBC 13.1 (H) 07/07/2020 05:40 PM    RBC 4.16 06/17/2024 08:05 AM    RBC 4.89  07/07/2020 05:40 PM    HEMOGLOBIN 12.6 (L) 06/17/2024 08:05 AM    HEMOGLOBIN 14.6 07/07/2020 05:40 PM    HEMATOCRIT 38.2 06/17/2024 08:05 AM    HEMATOCRIT 43.8 07/07/2020 05:40 PM    MCV 92 06/17/2024 08:05 AM    MCV 89.6 07/07/2020 05:40 PM    MCH 30.3 06/17/2024 08:05 AM    MCH 29.9 07/07/2020 05:40 PM    MCHC 33.0 06/17/2024 08:05 AM    MCHC 33.3 (L) 07/07/2020 05:40 PM    MPV 9.2 07/07/2020 05:40 PM    NEUTSPOLYS 88.40 (H) 07/07/2020 05:40 PM    LYMPHOCYTES 4.10 (L) 07/07/2020 05:40 PM    MONOCYTES 6.40 07/07/2020 05:40 PM    EOSINOPHILS 0.00 07/07/2020 05:40 PM    BASOPHILS 0.30 07/07/2020 05:40 PM    HYPOCHROMIA 1+ 06/17/2013 07:12 AM        Assessment and Plan:    #Encounter for driving examination  Patient does not have any medical conditions that precludes him from driving.  He does not have any sudden onset of blanking out or loss of consciousness.  - DMV medical paperwork filled out to allow patient to continue driving  - Vision paperwork to be filled out by patient's optometrist    #CAD  #Dyslipidemia  Following with cardiology. Per cardiology, goal LDL less than 55  Lipid panel in May shows LDL 26  - Continue aspirin 81 mg daily  - Refill atorvastatin 80 mg and Zetia 10 mg.  Patient has upcoming cardiology appointment this month.  Advised patient to let cardiology know LDL is at goal     #Hypertension  Was following with Dr. Trujillo, but since Dr. Trujillo is moving to [place], patient will now see Dr. Jiménez.  Blood pressures have been well-controlled.  BP today in clinic is 117/70  Currently on carvedilol 25 mg twice daily, irbesartan 300 mg daily, and hydrochlorothiazide 25 mg daily  - Refilled irbesartan and hydrochlorothiazide     #T2DM  Was following with endocrinology (Dr. Durant), but patient will need new endocrinologist as Dr. Durant is leaving.  - Referral to endocrinology sent     Chronic conditions not fully addressed at this visit  #Neuropathy  Following with pain management on  gabapentin    #Insomnia  Following pain management on Ambien     #Diarrhea  Likely secondary to malabsorption.  Has family history of celiac to his brother being diagnosed in his 70s  Last colonoscopy in 2021 showed 1 diminutive sessile polyp in the rectosigmoid colon with recommendation for repeat in 5 to 10 years  -Patient has upcoming gastric emptying study    Follow Up:  Return in about 6 weeks (around 9/25/2024) for annual wellness visit.

## 2024-08-26 ENCOUNTER — TELEPHONE (OUTPATIENT)
Dept: INTERNAL MEDICINE | Facility: OTHER | Age: 74
End: 2024-08-26
Payer: MEDICARE

## 2024-08-26 NOTE — TELEPHONE ENCOUNTER
VOICEMAIL  1. Caller Name: Dejan Mooney                        Call Back Number: [phone]    2. Message: Asking for a call back at number above    3. Patient approves office to leave a detailed voicemail/MyChart message: N\A

## 2024-08-26 NOTE — TELEPHONE ENCOUNTER
Caller Name: Dejan Mooney    Call Back Number: [phone]    How would the patient prefer to be contacted with a response: Phone call OK to leave a detailed message    Called on behalf of friend to get him scheduled.

## 2024-08-26 NOTE — TELEPHONE ENCOUNTER
Caller Name: Dejan Mooney    Call Back Number:[phone]    How would the patient prefer to be contacted with a response: Phone call OK to leave a detailed message    Called and scheduled friend

## 2024-09-12 ENCOUNTER — APPOINTMENT (OUTPATIENT)
Dept: RADIOLOGY | Facility: MEDICAL CENTER | Age: 74
End: 2024-09-12
Payer: MEDICARE

## 2024-09-16 ENCOUNTER — HOSPITAL ENCOUNTER (OUTPATIENT)
Dept: RADIOLOGY | Facility: MEDICAL CENTER | Age: 74
End: 2024-09-16
Payer: MEDICARE

## 2024-09-16 DIAGNOSIS — R19.7 DIARRHEA DUE TO MALABSORPTION: ICD-10-CM

## 2024-09-16 DIAGNOSIS — K90.9 DIARRHEA DUE TO MALABSORPTION: ICD-10-CM

## 2024-09-16 DIAGNOSIS — R10.9 ABDOMINAL PAIN, UNSPECIFIED ABDOMINAL LOCATION: ICD-10-CM

## 2024-09-16 PROCEDURE — A9541 TC99M SULFUR COLLOID: HCPCS

## 2024-09-25 ENCOUNTER — APPOINTMENT (OUTPATIENT)
Dept: INTERNAL MEDICINE | Facility: OTHER | Age: 74
End: 2024-09-25
Payer: MEDICARE

## 2024-11-04 ENCOUNTER — APPOINTMENT (OUTPATIENT)
Dept: INTERNAL MEDICINE | Facility: OTHER | Age: 74
End: 2024-11-04
Payer: MEDICARE

## 2024-11-04 VITALS
SYSTOLIC BLOOD PRESSURE: 120 MMHG | WEIGHT: 226 LBS | OXYGEN SATURATION: 93 % | HEART RATE: 81 BPM | HEIGHT: 74 IN | BODY MASS INDEX: 29 KG/M2 | DIASTOLIC BLOOD PRESSURE: 75 MMHG | TEMPERATURE: 97.8 F

## 2024-11-04 DIAGNOSIS — R93.1 ELEVATED CORONARY ARTERY CALCIUM SCORE: ICD-10-CM

## 2024-11-04 DIAGNOSIS — Z00.00 ANNUAL WELLNESS VISIT: ICD-10-CM

## 2024-11-04 DIAGNOSIS — Z79.4 TYPE 2 DIABETES MELLITUS WITH DIABETIC MONONEUROPATHY, WITH LONG-TERM CURRENT USE OF INSULIN (HCC): ICD-10-CM

## 2024-11-04 DIAGNOSIS — E78.5 DYSLIPIDEMIA: ICD-10-CM

## 2024-11-04 DIAGNOSIS — E11.41 TYPE 2 DIABETES MELLITUS WITH DIABETIC MONONEUROPATHY, WITH LONG-TERM CURRENT USE OF INSULIN (HCC): ICD-10-CM

## 2024-11-04 DIAGNOSIS — Z23 NEED FOR VACCINATION: ICD-10-CM

## 2024-11-04 DIAGNOSIS — E11.41 DIABETIC MONONEUROPATHY ASSOCIATED WITH TYPE 2 DIABETES MELLITUS (HCC): ICD-10-CM

## 2024-11-04 LAB
HBA1C MFR BLD: 6.3 % (ref ?–5.8)
POCT INT CON NEG: NEGATIVE
POCT INT CON POS: POSITIVE

## 2024-11-04 PROCEDURE — 90662 IIV NO PRSV INCREASED AG IM: CPT

## 2024-11-04 PROCEDURE — 3078F DIAST BP <80 MM HG: CPT

## 2024-11-04 PROCEDURE — 3074F SYST BP LT 130 MM HG: CPT

## 2024-11-04 PROCEDURE — 83036 HEMOGLOBIN GLYCOSYLATED A1C: CPT | Mod: GC

## 2024-11-04 PROCEDURE — G0438 PPPS, INITIAL VISIT: HCPCS | Mod: 25,GE

## 2024-11-04 PROCEDURE — G0008 ADMIN INFLUENZA VIRUS VAC: HCPCS

## 2024-11-04 ASSESSMENT — PATIENT HEALTH QUESTIONNAIRE - PHQ9: CLINICAL INTERPRETATION OF PHQ2 SCORE: 0

## 2024-11-04 ASSESSMENT — ENCOUNTER SYMPTOMS: GENERAL WELL-BEING: GOOD

## 2024-11-04 ASSESSMENT — ACTIVITIES OF DAILY LIVING (ADL): BATHING_REQUIRES_ASSISTANCE: 0

## 2024-11-04 NOTE — LETTER
[facility]  Ariel Santamaria D.O.  [address] [place] [address]  Fax: [phone]   Authorization for Release/Disclosure of   Protected Health Information   Name: TRAVON MOONEY : 1950 SSN: [id number]   Address: [address] [place] [address] [address] [place] [address] Phone:    There are no phone numbers on file.   I authorize the entity listed below to release/disclose the PHI below to:   Count includes the [facility]/Ariel Santamaria D.O. and Ariel Santamaria D.O.   Provider or Entity Name:  [facility]    Address   City, State, [facility]   Phone:      Fax:     Reason for request: continuity of care   Information to be released:    [  ] LAST COLONOSCOPY,  including any PATH REPORT and follow-up  [  ] LAST FIT/COLOGUARD RESULT [  ] LAST DEXA  [  ] LAST MAMMOGRAM  [  ] LAST PAP  [  ] LAST LABS [  ] RETINA EXAM REPORT  [  ] IMMUNIZATION RECORDS  [ x ] Release all info      [  ] Check here and initial the line next to each item to release ALL health information INCLUDING  _____ Care and treatment for drug and / or alcohol abuse  _____ HIV testing, infection status, or AIDS  _____ Genetic Testing    DATES OF SERVICE OR TIME PERIOD TO BE DISCLOSED: _____________  I understand and acknowledge that:  * This Authorization may be revoked at any time by you in writing, except if your health information has already been used or disclosed.  * Your health information that will be used or disclosed as a result of you signing this authorization could be re-disclosed by the recipient. If this occurs, your re-disclosed health information may no longer be protected by State or Federal laws.  * You may refuse to sign this Authorization. Your refusal will not affect your ability to obtain treatment.  * This Authorization becomes effective upon signing and will  on (date) __________.      If no date is indicated, this Authorization will  one (1) year from the signature date.    Name: Travon Mooney  Signature: Date:    11/4/2024     PLEASE FAX REQUESTED RECORDS BACK TO: [phone]

## 2024-11-04 NOTE — PROGRESS NOTES
Chief Complaint   Patient presents with    Follow-Up       HPI:  Dejan Mooney is a 74 y.o. here for Medicare Annual Wellness Visit     Patient Active Problem List    Diagnosis Date Noted    Diabetic neuropathy (HCC) 08/11/2022    Preventative health care 02/08/2022    Dyslipidemia 08/11/2021    Obstructive sleep apnea, adult 07/26/2021    Gastroesophageal reflux disease 03/11/2021    Primary insomnia 04/15/2020    Type 2 diabetes mellitus with diabetic mononeuropathy (HCC) 09/12/2017    Elevated coronary artery calcium score 06/13/2017    Primary hypertension 06/13/2017       Current Outpatient Medications   Medication Sig Dispense Refill    Insulin Degludec (TRESIBA FLEXTOUCH) 100 UNIT/ML Solution Pen-injector       atorvastatin (LIPITOR) 80 MG tablet Take 1 Tablet by mouth every evening. 100 Tablet 3    ezetimibe (ZETIA) 10 MG Tab Take 1 Tablet by mouth every day. 100 Tablet 3    hydroCHLOROthiazide 25 MG Tab Take 1 Tablet by mouth every day. 100 Tablet 3    irbesartan (AVAPRO) 300 MG Tab Take 1 Tablet by mouth every day. 100 Tablet 3    carvedilol (COREG) 25 MG Tab Take 1 Tablet by mouth 2 times a day with meals. 60 Tablet 2    Semaglutide, 1 MG/DOSE, (OZEMPIC, 1 MG/DOSE,) 2 MG/1.5ML Solution Pen-injector Inject 1 mg under the skin every 7 days. 1.5 mL 2    metFORMIN (GLUCOPHAGE) 500 MG Tab TAKE 1 TABLET BY MOUTH IN THE MORNING AND 2 TABLETS IN THE EVENING 270 Tablet 0    SSD 1 % Cream APPLY THIN LAYER TO WOUND EVERY DAY      zolpidem (AMBIEN) 10 MG Tab Take 10 mg by mouth at bedtime as needed for Sleep.      gabapentin (NEURONTIN) 800 MG tablet Take 1 Tablet by mouth 3 times a day. 90 Tablet 2    Continuous Blood Gluc Sensor (FREESTYLE HANH 14 DAY SENSOR) Misc FREESTYLE HANH 14 DAY SENSOR      BD PEN NEEDLE MICRO U/F 32G X 6 MM Misc       ASPIRIN 81 MG PO TABS Take 81 mg by mouth every day.       No current facility-administered medications for this visit.          Current supplements as per  medication list.     Allergies: Penicillins and Norvasc [amlodipine]    Current social contact/activities:      He  reports that he has never smoked. He has never used smokeless tobacco. He reports current alcohol use of about 1.2 oz of alcohol per week. He reports that he does not use drugs.  Counseling given: Not Answered      ROS:    Gait: Uses no assistive device  Ostomy: No  Other tubes: No  Amputations: No  Chronic oxygen use: No  Last eye exam: within last 90 days  Wears hearing aids: No   : Denies any urinary leakage during the last 6 months    Screening:  Diabetic retinopathy screening done by [facility] within the past 90 days. Records requested. Pt reports normal exam    Colonoscopy done 2021 with recommendation for repeat in 5-10 years     Depression Screening  Little interest or pleasure in doing things?  0 - not at all  Feeling down, depressed , or hopeless? 0 - not at all  Patient Health Questionnaire Score: 0     If depressive symptoms identified deferred to follow up visit unless specifically addressed in assessment and plan.    Interpretation of PHQ-9 Total Score   Score Severity   1-4 No Depression   5-9 Mild Depression   10-14 Moderate Depression   15-19 Moderately Severe Depression   20-27 Severe Depression    Screening for Cognitive Impairment  Do you or any of your friends or family members have any concern about your memory? No  Three Minute Recall (Leader, Season, Table) 3/3    Alberto clock face with all 12 numbers and set the hands to show 10 minutes after 11.  Yes    Cognitive concerns identified deferred for follow up unless specifically addressed in assessment and plan.    Fall Risk Assessment  Has the patient had two or more falls in the last year or any fall with injury in the last year?  No    Safety Assessment  Do you always wear your seatbelt?  Yes  Any changes to home needed to function safely? No  Difficulty hearing.  No  Patient counseled about all safety risks that  were identified.    Functional Assessment ADLs  Are there any barriers preventing you from cooking for yourself or meeting nutritional needs?  No.    Are there any barriers preventing you from driving safely or obtaining transportation?  No.    Are there any barriers preventing you from using a telephone or calling for help?  No    Are there any barriers preventing you from shopping?  No.    Are there any barriers preventing you from taking care of your own finances?  No    Are there any barriers preventing you from managing your medications?  No    Are there any barriers preventing you from showering, bathing or dressing yourself? No    Are there any barriers preventing you from doing housework or laundry? No  Are there any barriers preventing you from using the toilet?No  Are you currently engaging in any exercise or physical activity?  Yes.      Self-Assessment of Health  What is your perception of your health? Good    Do you sleep more than six hours a night? Yes    In the past 7 days, how much did pain keep you from doing your normal work? None    Do you spend quality time with family or friends (virtually or in person)? Yes    Do you usually eat a heart healthy diet that constists of a variety of fruits, vegetables, whole grains and fiber? No    Do you eat foods high in fat and/or Fast Food more than three times per week? No    How concerned are you that your medical conditions are not being well managed? Not at all    Are you worried that in the next 2 months, you may not have stable housing that you own, rent, or stay in as part of a household? No      Advance Care Planning  Do you have an Advance Directive, Living Will, Durable Power of , or POLST? Yes  Advance Directive       is not on file - instructed patient to bring in a copy to scan into their chart      Health Maintenance Summary            Overdue - Diabetes: Retinopathy Screening (Yearly) Never done      No completion history exists for  this topic.              Overdue - Zoster (Shingles) Vaccines (1 of 2) Never done      No completion history exists for this topic.              A1c Screening (Every 6 Months) Next due on 5/4/2025 11/04/2024  POCT  A1C    06/17/2024  HEMOGLOBIN A1C    02/29/2024  HEMOGLOBIN A1C    02/29/2024  HEMOGLOBIN A1C    09/09/2021  Outside Procedure: HEMOGLOBIN A1C    Only the first 5 history entries have been loaded, but more history exists.              Fasting Lipid Profile (Yearly) Next due on 5/16/2025 05/16/2024  LIPID PANEL    05/16/2024  Lipid Profile    06/29/2023  LIPID PANEL    06/29/2023  LIPID PROFILE    06/17/2013  LIPID PROFILE    Only the first 5 history entries have been loaded, but more history exists.              Ordered - Diabetes: Urine Protein Screening (Yearly) Ordered on 11/4/2024 05/16/2024  Done    09/22/2021  MICROALB/CREAT RATIO RAND. UR    12/14/2017  MICROALBUMIN CREAT RATIO URINE    03/11/2013  MICROALBUMIN CREAT RATIO URINE    10/29/2012  MICROALBUMIN CREAT RATIO URINE    Only the first 5 history entries have been loaded, but more history exists.              SERUM CREATININE (Yearly) Next due on 5/16/2025 05/16/2024  COMP METABOLIC PANEL    05/16/2024  Comp Metabolic Panel    06/29/2023  COMP METABOLIC PANEL    06/29/2023  COMP METABOLIC PANEL    09/22/2021  BASIC METABOLIC PANEL (8)    Only the first 5 history entries have been loaded, but more history exists.              Annual Wellness Visit (Yearly) Next due on 11/4/2025 11/04/2024  Done              Diabetes: Monofilament / LE Exam (Yearly) Tentatively due on 11/4/2025 11/04/2024  Diabetic Monofilament LE Exam    08/11/2022  [employer]: WORKFLOW - DIABETES - DIABETIC FOOT EXAM PERFORMED              Colorectal Cancer Screening (Colonoscopy - Every 10 Years) Tentatively due on 3/15/2031      03/15/2021  AMB EXTERNAL COLONOSCOPY RESULTS              IMM DTaP/Tdap/Td Vaccine (2 - Td or Tdap) Next due on  8/11/2032 08/11/2022  Imm Admin: Tdap Vaccine              Pneumococcal Vaccine: 65+ Years (Series Information) Completed      10/01/2020  Imm Admin: Pneumococcal polysaccharide vaccine (PPSV-23)    03/09/2020  Imm Admin: Pneumococcal Conjugate Vaccine (Prevnar/PCV-13)              Hepatitis C Screening  Completed      05/16/2024  Hepatitis C Antibody component of HEP C VIRUS ANTIBODY              COVID-19 Vaccine (Series Information) Completed      09/13/2024  Imm Admin: Covid-19 Mrna (Spikevax) Moderna 12+ Years    11/21/2022  Imm Admin: MODERNA BIVALENT BOOSTER SARS-COV-2 VACCINE (6+)    05/17/2022  Imm Admin: PFIZER PAGAN CAP SARS-COV-2 VACCINATION (12+)    03/11/2021  Imm Admin: MODERNA SARS-COV-2 VACCINE (12+)    02/11/2021  Imm Admin: MODERNA SARS-COV-2 VACCINE (12+)    Only the first 5 history entries have been loaded, but more history exists.              Influenza Vaccine (Series Information) Completed      11/04/2024  Imm Admin: Influenza high-dose trivalent (PF)    12/13/2023  Imm Admin: Influenza Vaccine Adult HD    10/10/2022  Imm Admin: Influenza Vaccine Adult HD    11/01/2021  Imm Admin: Influenza, unspecified formulation    02/27/2020  Imm Admin: Influenza, unspecified formulation    Only the first 5 history entries have been loaded, but more history exists.              Hepatitis A Vaccine (Hep A) (Series Information) Aged Out      No completion history exists for this topic.              Hepatitis B Vaccine (Hep B) (Series Information) Aged Out      No completion history exists for this topic.              HPV Vaccines (Series Information) Aged Out      No completion history exists for this topic.              Polio Vaccine (Inactivated Polio) (Series Information) Aged Out      No completion history exists for this topic.              Meningococcal Immunization (Series Information) Aged Out      No completion history exists for this topic.                    Patient Care Team:  Ariel Santamaria,  "D.O. as PCP - General (Internal Medicine)  Jamie Pittman M.D. (Endocrinology)  Walter Trujillo M.D. as Cardiologist (Cardiovascular Disease (Cardiology))        Social History     Tobacco Use    Smoking status: Never    Smokeless tobacco: Never   Vaping Use    Vaping status: Never Used   Substance Use Topics    Alcohol use: Yes     Alcohol/week: 1.2 oz     Types: 2 Standard drinks or equivalent per week     Comment: 1x/week    Drug use: No     Family History   Problem Relation Age of Onset    Heart Disease Mother     Hypertension Mother     Heart Disease Father     Hypertension Father     Atrial fibrillation Brother      He  has a past medical history of BBB (bundle branch block), Diabetes, Heart burn, Hyperlipidemia, Hypertension, Indigestion, Pain, Pneumonia (1974), Psychiatric problem, Renal calculus (7/2013), Skin rash (3/16/2022), and Snoring.    He has no past medical history of COPD, Liver disease, Psychiatric disorder, or Seizure disorder ([facility]).   Past Surgical History:   Procedure Laterality Date    RECOVERY  5/13/2014    Performed by Cath-Recovery Surgery at [facility]    OTHER  2/2014    right knee    KNEE ARTHROSCOPY  8/22/2013    Performed by Maurisio Alfaro M.D. at SURGERY [facility]    MENISCECTOMY, KNEE, MEDIAL  8/22/2013    Performed by [facility] M[facility]D. at [facility]    SHOULDER ARTHROTOMY  2003    right    KNEE ARTHROSCOPY  1990    left    KNEE ARTHROSCOPY  1985    right    ORIF, ANKLE  1984    left    ORIF, KNEE  1970    left    TONSILLECTOMY  as child       Exam:   /75 (BP Location: Left arm, Patient Position: Sitting, BP Cuff Size: Adult)   Pulse 81   Temp 36.6 °C (97.8 °F) (Temporal)   Ht 1.88 m (6' 2\")   Wt 103 kg (226 lb)   SpO2 93%  Body mass index is 29.02 kg/m².    Hearing good.    Dentition good  Alert, oriented in no acute distress.  Eye contact is good, speech goal directed, affect calm  Cardiovascular: rrr, no " murmurs  Respiratory: ctab, no wheezes or rales  GI: soft, non-distended  Neuro: a/ox4, no focal deficits    Assessment and Plan.    #Annual Medicare wellness visit    #need for vaccination  -Influenza vaccination given  -Recommended to obtain shingles vaccine at pharmacy    #CAD  #Dyslipidemia  Following with cardiology.  Goal LDL less than 55   LDL in May 26  On aspirin, atorvastatin, Zetia     #Hypertension  Following with cardiology   Blood pressures well controlled on carvedilol 25 mg bid, irbesartan 300 mg daily, and HCTZ 25 mg daily  BP in clinic 120/75     #T2DM  Following with endocrinology (Dr. Durant) who is now working with [facility]  Last A1c (6/2024): 6.7%  Recent A1c (11/2024): 6.3%  Monofilament test (11/2024): No sensation felt in all points  No open wounds seen at this time  Retinopathy test performed by [facility], records requested. Pt reports normal exam   - Instructed pt to regularly check feet for wounds  -Urine microalbumin creatinine ratio  - Check vitamin B12 while on metformin    #Neuropathy  Following with pain management on gabapentin     #Insomnia  Following pain management on Ambien             #Diarrhea improved  Likely secondary to malabsorption  Has family history of celiac to his brother being diagnosed in his 70s  Last colonoscopy in 2021 showed 1 diminutive sessile polyp in the rectosigmoid colon with recommendation for repeat in 5 to 10 years  Gastric emptying study normal  Has celiac panel, H. pylori, and stool tests ordered but patient has not completed    Services suggested: No services needed at this time  Health Care Screening: Age-appropriate preventive services recommended by USPTF and ACIP covered by Medicare were discussed today. Services ordered if indicated and agreed upon by the patient.  Referrals offered: Community-based lifestyle interventions to reduce health risks and promote self-management and wellness, fall prevention, nutrition, physical activity,  tobacco-use cessation, weight loss, and mental health services as per orders if indicated.    Discussion today about general wellness and lifestyle habits:    Prevent falls and reduce trip hazards; Cautioned about securing or removing rugs.  Have a working fire alarm and carbon monoxide detector;   Engage in regular physical activity and social activities     Follow-up: Return in about 6 months (around 5/4/2025).

## 2024-11-21 ENCOUNTER — OFFICE VISIT (OUTPATIENT)
Dept: CARDIOLOGY | Facility: MEDICAL CENTER | Age: 74
End: 2024-11-21
Attending: INTERNAL MEDICINE
Payer: MEDICARE

## 2024-11-21 VITALS
RESPIRATION RATE: 16 BRPM | WEIGHT: 218 LBS | BODY MASS INDEX: 27.98 KG/M2 | OXYGEN SATURATION: 94 % | SYSTOLIC BLOOD PRESSURE: 112 MMHG | HEIGHT: 74 IN | HEART RATE: 84 BPM | DIASTOLIC BLOOD PRESSURE: 78 MMHG

## 2024-11-21 DIAGNOSIS — Z79.4 TYPE 2 DIABETES MELLITUS WITH DIABETIC MONONEUROPATHY, WITH LONG-TERM CURRENT USE OF INSULIN (HCC): ICD-10-CM

## 2024-11-21 DIAGNOSIS — R93.1 ELEVATED CORONARY ARTERY CALCIUM SCORE: ICD-10-CM

## 2024-11-21 DIAGNOSIS — I25.84 CORONARY ARTERY DISEASE DUE TO CALCIFIED CORONARY LESION: ICD-10-CM

## 2024-11-21 DIAGNOSIS — I25.10 CORONARY ARTERY DISEASE DUE TO CALCIFIED CORONARY LESION: ICD-10-CM

## 2024-11-21 DIAGNOSIS — I10 PRIMARY HYPERTENSION: ICD-10-CM

## 2024-11-21 DIAGNOSIS — E11.41 TYPE 2 DIABETES MELLITUS WITH DIABETIC MONONEUROPATHY, WITH LONG-TERM CURRENT USE OF INSULIN (HCC): ICD-10-CM

## 2024-11-21 LAB — EKG IMPRESSION: NORMAL

## 2024-11-21 PROCEDURE — 99213 OFFICE O/P EST LOW 20 MIN: CPT | Performed by: INTERNAL MEDICINE

## 2024-11-21 PROCEDURE — 93010 ELECTROCARDIOGRAM REPORT: CPT | Performed by: INTERNAL MEDICINE

## 2024-11-21 PROCEDURE — 93005 ELECTROCARDIOGRAM TRACING: CPT | Performed by: INTERNAL MEDICINE

## 2024-11-21 PROCEDURE — 3078F DIAST BP <80 MM HG: CPT | Performed by: INTERNAL MEDICINE

## 2024-11-21 PROCEDURE — 3074F SYST BP LT 130 MM HG: CPT | Performed by: INTERNAL MEDICINE

## 2024-11-21 PROCEDURE — 99214 OFFICE O/P EST MOD 30 MIN: CPT | Performed by: INTERNAL MEDICINE

## 2024-11-21 RX ORDER — PEN NEEDLE, DIABETIC 32GX 5/32"
200 NEEDLE, DISPOSABLE MISCELLANEOUS DAILY
Qty: 100 EACH | Refills: 2 | Status: SHIPPED | OUTPATIENT
Start: 2024-11-21

## 2024-11-21 NOTE — PROGRESS NOTES
[facility]    PatientName:Dejan MooneyDate: 2024  :1950    74 y.o.PCP:Ariel Santamaria D.O.  MRN:[id number]        Problems and Plans    Primary hypertension  Blood pressures currently well-controlled with his current regiment no changes are made at this time    Elevated coronary artery calcium score  Recommend continuation of ongoing Lipitor therapy as well as ongoing Zetia therapy as well as integration of a healthy lifestyle consisting of largely Mediterranean diet as a recommended diet and integration of ongoing exercise.    Coronary artery disease due to calcified coronary lesion  Regarding his coronary artery disease was noted to be nonobstructive back in  and at this time this may have represented a source of his overall malaise and atypical presentation of chest discomfort.  I have recommended that he undergo an echocardiogram as well as a nuclear medicine treadmill stress test as part of his workup.  I have also recommended that he undergo a Zio patch monitor to assess for possible chronotropic incompetence with day-to-day activity and we will make formal recommendations given that he will perform a treadmill nuclear medicine stress test regarding possible chronotropic incompetence.  His physical examination did demonstrate significant ectopy.    Return in about 2 months (around 2025).      Encounter    Reason for Visit / Chief Complaint: Nonobstructive coronary artery disease    HPI    74-year-old male with known history of hypertension, insulin-dependent diabetes, nonobstructive coronary artery disease, chronic right bundle branch block benign positional peripheral vertigo presents to clinic for ongoing management of his nonobstructive coronary artery disease and hypertension.    Currently, he notes he is feeling well and notes that his diabetes is getting better controlled.  Blood pressure is significantly improved.  He has noticed  improvement in his overall ulceration on his right big toe after undergoing surgical intervention for congenital hammertoes.  He has had some overall malaise with some substernal chest discomfort and also has concerns that he may be having electrical abnormalities.  He is accompanied by his wife who reiterates similar history.  He has not had a recent cardiovascular testing.    Echocardiogram was performed on 2/25/2022 demonstrating preserved LV systolic function with estimate ejection fraction of 70% with grade 1 diastolic dysfunction aortic valve sclerosis without stenosis and trace aortic valve insufficiency with normal right-sided pressures  Past Medical History  Past Medical History:   Diagnosis Date    BBB (bundle branch block)     Diabetes     insulin and oral medication    Heart burn     Hyperlipidemia     Hypertension     Indigestion     Pain     knee, shoulder    Pneumonia 1974    Psychiatric problem     depression    Renal calculus 7/2013    kidney stones    Skin rash 3/16/2022    Snoring     sleep apnea questionairre completed     Past Surgical History  Past Surgical History:   Procedure Laterality Date    RECOVERY  5/13/2014    Performed by Cath-Recovery Surgery at [facility]    OTHER  2/2014    right knee    KNEE ARTHROSCOPY  8/22/2013    Performed by Maurisio Alfaro M.D. at SURGERY [facility]    MENISCECTOMY, KNEE, MEDIAL  8/22/2013    Performed by Maurisio Alfaro M.D. at SURGERY [facility]    SHOULDER ARTHROTOMY  2003    right    KNEE ARTHROSCOPY  1990    left    KNEE ARTHROSCOPY  1985    right    ORIF, ANKLE  1984    left    ORIF, KNEE  1970    left    TONSILLECTOMY  as child     Social History  Social History     Socioeconomic History    Marital status:      Spouse name: Not on file    Number of children: Not on file    Years of education: Not on file    Highest education level: Not on file   Occupational History    Not on file   Tobacco Use     Smoking status: Never    Smokeless tobacco: Never   Vaping Use    Vaping status: Never Used   Substance and Sexual Activity    Alcohol use: Yes     Alcohol/week: 1.2 oz     Types: 2 Standard drinks or equivalent per week     Comment: 1x/week    Drug use: No    Sexual activity: Not on file   Other Topics Concern    Not on file   Social History Narrative    Not on file     Social Drivers of Health     Financial Resource Strain: Not on file   Food Insecurity: Not on file   Transportation Needs: Not on file   Physical Activity: Not on file   Stress: Not on file   Social Connections: Not on file   Intimate Partner Violence: Not on file   Housing Stability: Not on file     Past Family History  Family History   Problem Relation Age of Onset    Heart Disease Mother     Hypertension Mother     Heart Disease Father     Hypertension Father     Atrial fibrillation Brother      Medication(s)    Current Outpatient Medications:     BD Pen Needle Grace U/F, 200 Units, Does not apply, DAILY AT 1800, Taking    Tresiba FlexTouch, , Taking    atorvastatin, 80 mg, Oral, Nightly, Taking    ezetimibe, 10 mg, Oral, DAILY, Taking    hydroCHLOROthiazide, 25 mg, Oral, DAILY, Taking    irbesartan, 300 mg, Oral, DAILY, Taking    carvedilol, 25 mg, Oral, BID WITH MEALS, Taking    Ozempic (1 MG/DOSE), 1 mg, Subcutaneous, Q7 DAYS, Taking    metFORMIN, TAKE 1 TABLET BY MOUTH IN THE MORNING AND 2 TABLETS IN THE EVENING, Taking    SSD, APPLY THIN LAYER TO WOUND EVERY DAY, Taking    zolpidem, 10 mg, Oral, HS PRN, PRN    gabapentin, 800 mg, Oral, TID, Taking    FreeStyle Shabbir 14 Day Sensor, FREESTYLE SHABBIR 14 DAY SENSOR, Taking    BD Pen Needle Micro U/F, , Taking    aspirin, 81 mg, Oral, DAILY, Taking  Allergies  Penicillins and Norvasc [amlodipine]    Review of Systems    A comprehensive 10 system review was conducted and is negative except as noted above in the HPI or here.      Vital Signs  /78 (BP Location: Left arm, Patient Position:  "Sitting, BP Cuff Size: Adult)   Pulse 84   Resp 16   Ht 1.88 m (6' 2\")   Wt 98.9 kg (218 lb)   SpO2 94%   BMI 27.99 kg/m²     Physical Exam  Constitutional:       Appearance: Normal appearance. He is obese.   HENT:      Head: Normocephalic and atraumatic.      Mouth/Throat:      Mouth: Mucous membranes are moist.      Pharynx: Oropharynx is clear.   Eyes:      Extraocular Movements: Extraocular movements intact.      Conjunctiva/sclera: Conjunctivae normal.   Cardiovascular:      Rate and Rhythm: Normal rate and regular rhythm.      Pulses: Normal pulses.      Heart sounds: Normal heart sounds. No murmur heard.     No friction rub. No gallop.      Comments: Loss of male pattern hair in the lower extremity bilateral.  Bilateral varicose veins  Pulmonary:      Effort: Pulmonary effort is normal.      Breath sounds: Normal breath sounds.   Abdominal:      General: Bowel sounds are normal.      Palpations: Abdomen is soft.   Musculoskeletal:         General: Normal range of motion.      Cervical back: Normal range of motion and neck supple.   Skin:     General: Skin is warm and dry.   Neurological:      General: No focal deficit present.      Mental Status: He is alert and oriented to person, place, and time. Mental status is at baseline.   Psychiatric:         Mood and Affect: Mood normal.         Behavior: Behavior normal.         Thought Content: Thought content normal.         Judgment: Judgment normal.         Lab Results   Component Value Date/Time    TSHULTRASEN 1.190 03/11/2013 0821        Lab Results   Component Value Date/Time    FREET4 1.05 03/11/2013 0821        Lab Results   Component Value Date/Time    HBA1C 6.3 (A) 11/04/2024 12:44 AM       Lab Results   Component Value Date/Time    CHOLSTRLTOT 74 (L) 05/16/2024 07:35 AM    CHOLSTRLTOT 144 06/17/2013 07:12 AM    LDL 71 06/17/2013 07:12 AM    HDL 33 (L) 05/16/2024 07:35 AM    HDL 31 (A) 06/17/2013 07:12 AM    TRIGLYCERIDE 66 05/16/2024 07:35 AM    " TRIGLYCERIDE 211 (H) 06/17/2013 07:12 AM         Lab Results   Component Value Date/Time    SODIUM 142 05/16/2024 07:35 AM    SODIUM 135 07/07/2020 05:40 PM    POTASSIUM 4.7 05/16/2024 07:35 AM    POTASSIUM 4.0 07/07/2020 05:40 PM    CHLORIDE 102 05/16/2024 07:35 AM    CHLORIDE 97 07/07/2020 05:40 PM    CO2 26 05/16/2024 07:35 AM    CO2 21 07/07/2020 05:40 PM    GLUCOSE 115 (H) 05/16/2024 07:35 AM    GLUCOSE 181 (H) 07/07/2020 05:40 PM    BUN 33 (H) 05/16/2024 07:35 AM    BUN 29 (H) 07/07/2020 05:40 PM    CREATININE 0.77 05/16/2024 07:35 AM    CREATININE 1.21 07/07/2020 05:40 PM    BUNCREATRAT 43 (H) 05/16/2024 07:35 AM       Lab Results   Component Value Date/Time    ALKPHOSPHAT 113 05/16/2024 07:35 AM    ALKPHOSPHAT 76 07/07/2020 05:40 PM    ASTSGOT 16 05/16/2024 07:35 AM    ASTSGOT 15 07/07/2020 05:40 PM    ALTSGPT 13 05/16/2024 07:35 AM    ALTSGPT 17 07/07/2020 05:40 PM    TBILIRUBIN 0.3 05/16/2024 07:35 AM    TBILIRUBIN 1.0 07/07/2020 05:40 PM         Imaging  EKG demonstrates sinus rhythm with a right bundle branch block and noted 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  This is unchanged from his prior EKG.    Echocardiogram  Pending      Total patient time was estimated to be 30 minutes consisting of chart review, direct patient interaction, medication renewal, plan development and overall communication with the cardiovascular team.        Electronically signed by:   Vamshi Delgado DO, MPH  [facility]    Portions of this note were completed using voice recognition software (Dragon Naturally speaking software) . Occasional transcription errors may have escaped proof reading. I have made every reasonable attempt to correct obvious errors, but I expect that there are errors of grammar and possibly content that I did not discover before finalizing the note.

## 2024-11-22 ENCOUNTER — APPOINTMENT (OUTPATIENT)
Dept: CARDIOLOGY | Facility: MEDICAL CENTER | Age: 74
End: 2024-11-22
Attending: INTERNAL MEDICINE
Payer: MEDICARE

## 2024-11-22 NOTE — ASSESSMENT & PLAN NOTE
Recommend continuation of ongoing Lipitor therapy as well as ongoing Zetia therapy as well as integration of a healthy lifestyle consisting of largely Mediterranean diet as a recommended diet and integration of ongoing exercise.

## 2024-11-22 NOTE — ASSESSMENT & PLAN NOTE
Blood pressures currently well-controlled with his current regiment no changes are made at this time

## 2024-11-22 NOTE — PATIENT INSTRUCTIONS
Follow up in 2 months  Check an echocardiogram, treadmill nuclear stress test and Ziopatch  Continue current medications  Call with questions.

## 2024-11-22 NOTE — ASSESSMENT & PLAN NOTE
Regarding his coronary artery disease was noted to be nonobstructive back in 2014 and at this time this may have represented a source of his overall malaise and atypical presentation of chest discomfort.  I have recommended that he undergo an echocardiogram as well as a nuclear medicine treadmill stress test as part of his workup.  I have also recommended that he undergo a Zio patch monitor to assess for possible chronotropic incompetence with day-to-day activity and we will make formal recommendations given that he will perform a treadmill nuclear medicine stress test regarding possible chronotropic incompetence.  His physical examination did demonstrate significant ectopy.

## 2024-11-26 ENCOUNTER — OFFICE VISIT (OUTPATIENT)
Dept: ENDOCRINOLOGY | Facility: MEDICAL CENTER | Age: 74
End: 2024-11-26
Attending: INTERNAL MEDICINE
Payer: MEDICARE

## 2024-11-26 VITALS
BODY MASS INDEX: 27.73 KG/M2 | WEIGHT: 223 LBS | HEIGHT: 75 IN | DIASTOLIC BLOOD PRESSURE: 75 MMHG | HEART RATE: 82 BPM | SYSTOLIC BLOOD PRESSURE: 130 MMHG | OXYGEN SATURATION: 98 %

## 2024-11-26 DIAGNOSIS — Z79.4 TYPE 2 DIABETES MELLITUS WITH DIABETIC MONONEUROPATHY, WITH LONG-TERM CURRENT USE OF INSULIN (HCC): ICD-10-CM

## 2024-11-26 DIAGNOSIS — E11.41 TYPE 2 DIABETES MELLITUS WITH DIABETIC MONONEUROPATHY, WITH LONG-TERM CURRENT USE OF INSULIN (HCC): ICD-10-CM

## 2024-11-26 DIAGNOSIS — I25.10 CORONARY ARTERY DISEASE DUE TO CALCIFIED CORONARY LESION: ICD-10-CM

## 2024-11-26 DIAGNOSIS — E78.5 DYSLIPIDEMIA: ICD-10-CM

## 2024-11-26 DIAGNOSIS — I25.84 CORONARY ARTERY DISEASE DUE TO CALCIFIED CORONARY LESION: ICD-10-CM

## 2024-11-26 PROCEDURE — 99212 OFFICE O/P EST SF 10 MIN: CPT | Performed by: INTERNAL MEDICINE

## 2024-11-26 NOTE — PROGRESS NOTES
New Patient Note for Endocrinology    Referred by: Ariel Santamaria D.O.    Dejan Mooney is a 74 y.o. male who presents today as a new patient with the following Chief Complaint(s): Follow up for Diagnoses of Type 2 diabetes mellitus with diabetic mononeuropathy, with long-term current use of insulin (HCC) [E11.41, Z79.4], Dyslipidemia, and Coronary artery disease due to calcified coronary lesion were pertinent to this visit.    HPI:  Patient is a 74-year-old male with a history of type 2 diabetes, diabetic neuropathy, dyslipidemia, and coronary artery disease here to establish.    Patient currently uses jocelyne 2 and is 79% in range (11/2024)  Patient denies significant lows  11/2024 A1c 6.3%  Patient follows with ophthalmology for retinal pucker which is stable  Patient follows with cardiology for CAD  Patient is on atorvastatin 80 and Zetia for dyslipidemia    Current regimen  Jardiance 25  Degludec  Semaglutide 2 mg  Metformin 500 mg 3 times daily      ROS:  Per HPI, otherwise: Negative  General: Denies fever/chills, weight loss, fatigue, recent illness, weakness  Skin:  Denies rashes, lesions  HEENT: Denies sore throat  Resp:  Denies SOB, dypsnea on exertion  CV:  Denies chest pain, palpitations, diaphoresis  GI:  Denies nausea/vomiting, melena, PUD  :  Denies dysuria, urgency, frequency, hematuria  MS:  Denies other joint pain, myalgias  Neuro:  Denies dizziness, balance problems, numbness/tingling  Rheum: Denies polyarthralgias, history of arthritis or gout  Heme:  Denies anemia, easy bruising  Endo:  Denies osteoporosis    Past Medical History:  Patient Active Problem List    Diagnosis Date Noted    Coronary artery disease due to calcified coronary lesion 11/21/2024    Diabetic neuropathy (HCC) 08/11/2022    Preventative health care 02/08/2022    Dyslipidemia 08/11/2021    Obstructive sleep apnea, adult 07/26/2021    Gastroesophageal reflux disease 03/11/2021    Primary insomnia 04/15/2020     Type 2 diabetes mellitus with diabetic mononeuropathy (HCC) 09/12/2017    Elevated coronary artery calcium score 06/13/2017    Primary hypertension 06/13/2017       Past Surgical History:  Past Surgical History:   Procedure Laterality Date    RECOVERY  5/13/2014    Performed by Cath-Recovery Surgery at [facility]    OTHER  2/2014    right knee    KNEE ARTHROSCOPY  8/22/2013    Performed by Maurisio Alfaro M.D. at [facility]    MENISCECTOMY, KNEE, MEDIAL  8/22/2013    Performed by Maurisio Alfaro M.D. at [facility]    SHOULDER ARTHROTOMY  2003    right    KNEE ARTHROSCOPY  1990    left    KNEE ARTHROSCOPY  1985    right    ORIF, ANKLE  1984    left    ORIF, KNEE  1970    left    TONSILLECTOMY  as child       Allergies:  Penicillins and Norvasc [amlodipine]    Social History:  Social History     Socioeconomic History    Marital status:      Spouse name: Not on file    Number of children: Not on file    Years of education: Not on file    Highest education level: Not on file   Occupational History    Not on file   Tobacco Use    Smoking status: Never    Smokeless tobacco: Never   Vaping Use    Vaping status: Never Used   Substance and Sexual Activity    Alcohol use: Yes     Alcohol/week: 1.2 oz     Types: 2 Standard drinks or equivalent per week     Comment: 1x/week    Drug use: No    Sexual activity: Not on file   Other Topics Concern    Not on file   Social History Narrative    Not on file     Social Drivers of Health     Financial Resource Strain: Not on file   Food Insecurity: Not on file   Transportation Needs: Not on file   Physical Activity: Not on file   Stress: Not on file   Social Connections: Not on file   Intimate Partner Violence: Not on file   Housing Stability: Not on file       Family History:  Family History   Problem Relation Age of Onset    Heart Disease Mother     Hypertension Mother     Heart Disease Father     Hypertension Father      "Atrial fibrillation Brother        Medications:    Current Outpatient Medications:     Insulin Pen Needle 32 G x 4 mm (BD PEN NEEDLE ANNETTE U/F), 200 Units every day at 6 PM., Disp: 100 Each, Rfl: 2    Insulin Degludec (TRESIBA FLEXTOUCH) 100 UNIT/ML Solution Pen-injector, , Disp: , Rfl:     atorvastatin (LIPITOR) 80 MG tablet, Take 1 Tablet by mouth every evening., Disp: 100 Tablet, Rfl: 3    ezetimibe (ZETIA) 10 MG Tab, Take 1 Tablet by mouth every day., Disp: 100 Tablet, Rfl: 3    hydroCHLOROthiazide 25 MG Tab, Take 1 Tablet by mouth every day., Disp: 100 Tablet, Rfl: 3    irbesartan (AVAPRO) 300 MG Tab, Take 1 Tablet by mouth every day., Disp: 100 Tablet, Rfl: 3    carvedilol (COREG) 25 MG Tab, Take 1 Tablet by mouth 2 times a day with meals., Disp: 60 Tablet, Rfl: 2    Semaglutide, 1 MG/DOSE, (OZEMPIC, 1 MG/DOSE,) 2 MG/1.5ML Solution Pen-injector, Inject 1 mg under the skin every 7 days. (Patient taking differently: Inject 2 mg under the skin every 7 days.), Disp: 1.5 mL, Rfl: 2    metFORMIN (GLUCOPHAGE) 500 MG Tab, TAKE 1 TABLET BY MOUTH IN THE MORNING AND 2 TABLETS IN THE EVENING, Disp: 270 Tablet, Rfl: 0    SSD 1 % Cream, APPLY THIN LAYER TO WOUND EVERY DAY, Disp: , Rfl:     zolpidem (AMBIEN) 10 MG Tab, Take 10 mg by mouth at bedtime as needed for Sleep., Disp: , Rfl:     gabapentin (NEURONTIN) 800 MG tablet, Take 1 Tablet by mouth 3 times a day., Disp: 90 Tablet, Rfl: 2    Continuous Blood Gluc Sensor (FREESTYLE HANH 14 DAY SENSOR) Misc, FREESTYLE HANH 14 DAY SENSOR, Disp: , Rfl:     BD PEN NEEDLE MICRO U/F 32G X 6 MM Misc, , Disp: , Rfl:     ASPIRIN 81 MG PO TABS, Take 81 mg by mouth every day., Disp: , Rfl:     Physical Examination:   Vital signs: /75   Pulse 82   Ht 1.892 m (6' 2.5\")   Wt 101 kg (223 lb)   SpO2 98%   BMI 28.25 kg/m²   General: No distress, cooperative, well dressed and well nourished.   Eyes: No scleral icterus or discharge  ENMT: Normal on external inspection of nose, lips, " No nasal drainage   Neck: No abnormal masses on inspection  Resp: Normal effort  Extremities: No edema bilateral extremities  Neuro: Alert and oriented  Psych: Normal mood and affect      Assessment and Plan:    1. Type 2 diabetes mellitus with diabetic mononeuropathy, with long-term current use of insulin (HCC) [E11.41, Z79.4]  Patient doing well on current regimen  No changes  Continue jocelyne 2-we may consider transition to jocelyne 3 in the future  Follow-up in 4 to 5 months with labs    - Comp Metabolic Panel; Future  - MICROALBUMIN CREAT RATIO URINE; Future  - TSH WITH REFLEX TO FT4; Future  - Lipid Profile; Future  - HEMOGLOBIN A1C; Future    2. Dyslipidemia  Continue atorvastatin 80 and Zetia  No changes  Recheck lipid panel prior to follow-up    - Comp Metabolic Panel; Future  - MICROALBUMIN CREAT RATIO URINE; Future  - TSH WITH REFLEX TO FT4; Future  - Lipid Profile; Future  - HEMOGLOBIN A1C; Future    3. Coronary artery disease due to calcified coronary lesion  Follows with cardiology  No changes        Ariel Durant M.D.

## 2024-11-27 NOTE — PROGRESS NOTES
Home enrollment completed in the 14 day Zio Holter monitor per Vamshi Delgado MD. Monitor will be shipped to patient via [employer], pending EOS.

## 2024-12-03 ENCOUNTER — NON-PROVIDER VISIT (OUTPATIENT)
Dept: CARDIOLOGY | Facility: MEDICAL CENTER | Age: 74
End: 2024-12-03
Attending: INTERNAL MEDICINE
Payer: MEDICARE

## 2024-12-18 ENCOUNTER — HOSPITAL ENCOUNTER (OUTPATIENT)
Dept: RADIOLOGY | Facility: MEDICAL CENTER | Age: 74
End: 2024-12-18
Attending: INTERNAL MEDICINE
Payer: MEDICARE

## 2024-12-18 DIAGNOSIS — I25.84 CORONARY ARTERY DISEASE DUE TO CALCIFIED CORONARY LESION: ICD-10-CM

## 2024-12-18 DIAGNOSIS — I25.10 CORONARY ARTERY DISEASE DUE TO CALCIFIED CORONARY LESION: ICD-10-CM

## 2024-12-18 PROCEDURE — A9502 TC99M TETROFOSMIN: HCPCS

## 2024-12-19 PROCEDURE — 93018 CV STRESS TEST I&R ONLY: CPT | Performed by: INTERNAL MEDICINE

## 2024-12-19 PROCEDURE — 78452 HT MUSCLE IMAGE SPECT MULT: CPT | Mod: 26 | Performed by: INTERNAL MEDICINE

## 2025-01-02 ENCOUNTER — TELEPHONE (OUTPATIENT)
Dept: CARDIOLOGY | Facility: MEDICAL CENTER | Age: 75
End: 2025-01-02
Payer: MEDICARE

## 2025-01-02 NOTE — TELEPHONE ENCOUNTER
Lei EOS to BD's nurse, Simran, on 1/2/2025    Preliminary findings:    1 episode of -126 with an avg rate of 119 bpm    9 episodes of SVT  with an avg rate of 112 bpm    Sinus Rhythm  with an avg rate of 84 bpm  BBB/IVCD present    Supraventricular and ventricular ectopics were rare    Ventricular trigeminy was present    No patient events

## 2025-01-08 ENCOUNTER — OFFICE VISIT (OUTPATIENT)
Dept: INTERNAL MEDICINE | Facility: OTHER | Age: 75
End: 2025-01-08
Payer: MEDICARE

## 2025-01-08 VITALS
TEMPERATURE: 97.5 F | DIASTOLIC BLOOD PRESSURE: 64 MMHG | OXYGEN SATURATION: 95 % | BODY MASS INDEX: 29.03 KG/M2 | WEIGHT: 226.19 LBS | SYSTOLIC BLOOD PRESSURE: 117 MMHG | HEIGHT: 74 IN | HEART RATE: 78 BPM

## 2025-01-08 DIAGNOSIS — R26.89 BALANCE PROBLEMS: ICD-10-CM

## 2025-01-08 DIAGNOSIS — H81.8X9 UNILATERAL VESTIBULAR WEAKNESS: ICD-10-CM

## 2025-01-08 PROCEDURE — 3078F DIAST BP <80 MM HG: CPT | Mod: GC

## 2025-01-08 PROCEDURE — 99213 OFFICE O/P EST LOW 20 MIN: CPT | Mod: GE

## 2025-01-08 PROCEDURE — 3074F SYST BP LT 130 MM HG: CPT | Mod: GC

## 2025-01-08 ASSESSMENT — PATIENT HEALTH QUESTIONNAIRE - PHQ9: CLINICAL INTERPRETATION OF PHQ2 SCORE: 0

## 2025-01-08 NOTE — PROGRESS NOTES
Established Patient    Patient Care Team:  Ariel Santamaria D.O. as PCP - General (Internal Medicine)  Jamie Pittman M.D. (Endocrinology)  Walter Trujillo M.D. as Cardiologist (Cardiovascular Disease (Cardiology))    Dejan Mooney is a 74 y.o. male who presents today with the following Chief Complaint(s): Follow up for Diagnoses of Unilateral vestibular weakness and Balance problems were pertinent to this visit.    HPI:  1. Unilateral vestibular weakness    Mr. Mooney is a 75 yo male who came to clinic with a chief complaint of balance issues.  The patient is currently established with Dr. Ariel Santamaria.     Patient states that approximately December 2024 he started having balance issues, he has noticed specifically after he stands up from the sitting position, patient denies feeling that the room is spinning around him, denies any ground-level fall since this episode started, denies adding or taking out any medication, denies drinking alcohol just social as a usual, denies any tremors, ataxia, bradykinesia, blurry vision, loss of olfactory sense.  Patient states that he has a glucose monitor which has not show any hypoglycemic episodes and he usually checks his blood pressure and are within normal limits with no hypotension episodes, patient not presenting episode of sweating or loss of consciousness.    The patient states that he has a past family history of Parkinson disease from her mother side, states that he his grandfather and uncle had Parkinson in the past.    The patient states that he went to the ENT doctor due to the symptoms and the specialist did rule out prior to either peripheral or central and did make the diagnosis of vestibular weakness recommending physical therapy for this condition.    Patient have a MRI back in 2019 showing some cerebral atrophy and some ischemic changes but were chronic not acute.      ROS:     General: No fevers, chills, night sweats, weight loss or  gain  HEENT: No hearing changes, vision changes, eye pain, ear pain, nasal discharge, sore throat  Neck: No swelling in neck  Pulmonary: No shortness of breath, cough, sputum, or hemoptysis  Cardiovascular: No chest pain, palpitations, or LE swelling  GI: No nausea, vomiting, diarrhea, constipation, abdominal pain, hematochezia or melena  : No dysuria or frequency  Neuro: No focal weakness, no general weakness, no headaches, no lightheadedness, no dizziness  Psych: No anxiety or depression    Past Medical History:   Diagnosis Date    BBB (bundle branch block)     Diabetes     insulin and oral medication    Heart burn     Hyperlipidemia     Hypertension     Indigestion     Pain     knee, shoulder    Pneumonia 1974    Psychiatric problem     depression    Renal calculus 7/2013    kidney stones    Skin rash 3/16/2022    Snoring     sleep apnea questionairre completed     Social History     Tobacco Use    Smoking status: Never    Smokeless tobacco: Never   Vaping Use    Vaping status: Never Used   Substance Use Topics    Alcohol use: Yes     Alcohol/week: 1.2 oz     Types: 2 Standard drinks or equivalent per week     Comment: 1x/week    Drug use: No     Current Outpatient Medications   Medication Sig Dispense Refill    Insulin Pen Needle 32 G x 4 mm (BD PEN NEEDLE ANNETTE U/F) 200 Units every day at 6 PM. 100 Each 2    Insulin Degludec (TRESIBA FLEXTOUCH) 100 UNIT/ML Solution Pen-injector       atorvastatin (LIPITOR) 80 MG tablet Take 1 Tablet by mouth every evening. 100 Tablet 3    ezetimibe (ZETIA) 10 MG Tab Take 1 Tablet by mouth every day. 100 Tablet 3    hydroCHLOROthiazide 25 MG Tab Take 1 Tablet by mouth every day. 100 Tablet 3    irbesartan (AVAPRO) 300 MG Tab Take 1 Tablet by mouth every day. 100 Tablet 3    carvedilol (COREG) 25 MG Tab Take 1 Tablet by mouth 2 times a day with meals. 60 Tablet 2    Semaglutide, 1 MG/DOSE, (OZEMPIC, 1 MG/DOSE,) 2 MG/1.5ML Solution Pen-injector Inject 1 mg under the skin every 7  "days. (Patient taking differently: Inject 2 mg under the skin every 7 days.) 1.5 mL 2    metFORMIN (GLUCOPHAGE) 500 MG Tab TAKE 1 TABLET BY MOUTH IN THE MORNING AND 2 TABLETS IN THE EVENING 270 Tablet 0    SSD 1 % Cream APPLY THIN LAYER TO WOUND EVERY DAY      zolpidem (AMBIEN) 10 MG Tab Take 10 mg by mouth at bedtime as needed for Sleep.      gabapentin (NEURONTIN) 800 MG tablet Take 1 Tablet by mouth 3 times a day. 90 Tablet 2    Continuous Blood Gluc Sensor (FREESTYLE HNAH 14 DAY SENSOR) Misc FREESTYLE HANH 14 DAY SENSOR      BD PEN NEEDLE MICRO U/F 32G X 6 MM Misc       ASPIRIN 81 MG PO TABS Take 81 mg by mouth every day.       No current facility-administered medications for this visit.       Physical Exam:  /64 (BP Location: Left arm, Patient Position: Sitting, BP Cuff Size: Adult)   Pulse 78   Temp 36.4 °C (97.5 °F) (Temporal)   Ht 1.88 m (6' 2\")   Wt 103 kg (226 lb 3.1 oz)   SpO2 95%   BMI 29.04 kg/m²   General: Well developed, well nourished male, in no distress.  HEENT: NC/AT, PERRL, EOMI, no scleral icterus or conjunctival pallor, fair dentition/denture in, no nasal discharge or oral erythema or exudates.   Neck: Supple, No cervical or supraclavicular LAD  CV:RRR, no murmurs gallops or Rubs, no JVD  Pulm: LCAB, no crackles, rales, rhonchi, or wheezing  GI: Normal bowel sounds, abdomen soft, nontender, nondistended to deep or light palpation in all 4 quadrants, no HSM.  MSK: Radial and dorsalis pedis pulses 2+ and equal bilaterally, respectively.  Strength 5 out of 5 in upper left and lower extremities. Right arm streght 3/5 but chronic due to tear repair, No lower extremity edema  Neuro: Patient is alert and oriented x3, no focal deficits, Romberg sign negative  Psych: Appropriate mood and affect       Assessment and Plan:   1. Unilateral vestibular weakness  ENT made a diagnosis and recommend physical therapy, patient came with the form for physical therapy and requesting a signature from " PCP which was given during this visit.  -ENT follow-up  -Continue with physical therapy    2. Balance problems  Patient presenting balance issues since last December 2024, physical examination was benign with no neurologic abnormalities, no tremors at baseline, Romberg negative.   Differentials could be Parkinson disease because patient has past family history of Parkinson from his mother side but not presenting bradykinesia, tremors, only balance issues acute in setting, MRI from 2019 showing cerebral atrophy, consider MRI if physical therapy does not improve symptoms for vestibular weakness.  Vitamin B12 deficiency, neurosyphilis part of the differentials and sending RPR and B12 levels to rule out.  Patient also saw ENT Dr. Mccarthy with vestibular weakness which could be also another option for balance issues.  - RPR (SYPHILIS); Future  -Patient pending CMP, TSH/ T4, B12 levels  -Patient to follow-up with physical therapy as ENT recommended if no improvement consider sending MRI for further evaluation        Follow-up with , lab results and if physical therapy has improved symptoms if not consider MRI last 1 done in 2019.    Lon Miller M.D. PGY II  [facility]    This note was created using voice recognition software.  While every attempt is made to ensure accuracy of transcription, occasionally errors occur.

## 2025-01-27 ENCOUNTER — APPOINTMENT (OUTPATIENT)
Dept: INTERNAL MEDICINE | Facility: OTHER | Age: 75
End: 2025-01-27
Payer: MEDICARE

## 2025-01-29 ENCOUNTER — OFFICE VISIT (OUTPATIENT)
Dept: INTERNAL MEDICINE | Facility: OTHER | Age: 75
End: 2025-01-29
Payer: MEDICARE

## 2025-01-29 ENCOUNTER — HOSPITAL ENCOUNTER (OUTPATIENT)
Dept: CARDIOLOGY | Facility: MEDICAL CENTER | Age: 75
End: 2025-01-29
Attending: INTERNAL MEDICINE
Payer: MEDICARE

## 2025-01-29 VITALS
WEIGHT: 224 LBS | HEIGHT: 74 IN | DIASTOLIC BLOOD PRESSURE: 53 MMHG | OXYGEN SATURATION: 98 % | BODY MASS INDEX: 28.75 KG/M2 | HEART RATE: 96 BPM | SYSTOLIC BLOOD PRESSURE: 106 MMHG | TEMPERATURE: 97.6 F

## 2025-01-29 DIAGNOSIS — Z79.4 TYPE 2 DIABETES MELLITUS WITH DIABETIC MONONEUROPATHY, WITH LONG-TERM CURRENT USE OF INSULIN (HCC): ICD-10-CM

## 2025-01-29 DIAGNOSIS — E78.5 DYSLIPIDEMIA: ICD-10-CM

## 2025-01-29 DIAGNOSIS — I25.10 CORONARY ARTERY DISEASE DUE TO CALCIFIED CORONARY LESION: ICD-10-CM

## 2025-01-29 DIAGNOSIS — R93.1 ELEVATED CORONARY ARTERY CALCIUM SCORE: ICD-10-CM

## 2025-01-29 DIAGNOSIS — I10 PRIMARY HYPERTENSION: ICD-10-CM

## 2025-01-29 DIAGNOSIS — R26.89 BALANCE PROBLEMS: ICD-10-CM

## 2025-01-29 DIAGNOSIS — H81.8X9 UNILATERAL VESTIBULAR WEAKNESS: ICD-10-CM

## 2025-01-29 DIAGNOSIS — B35.9 TINEA: ICD-10-CM

## 2025-01-29 DIAGNOSIS — I25.84 CORONARY ARTERY DISEASE DUE TO CALCIFIED CORONARY LESION: ICD-10-CM

## 2025-01-29 DIAGNOSIS — E11.41 TYPE 2 DIABETES MELLITUS WITH DIABETIC MONONEUROPATHY, WITH LONG-TERM CURRENT USE OF INSULIN (HCC): ICD-10-CM

## 2025-01-29 LAB
LV EJECT FRACT MOD 2C 99903: 45.33
LV EJECT FRACT MOD 4C 99902: 66.32
LV EJECT FRACT MOD BP 99901: 57.34

## 2025-01-29 PROCEDURE — 93306 TTE W/DOPPLER COMPLETE: CPT

## 2025-01-29 RX ORDER — ATORVASTATIN CALCIUM 40 MG/1
1 TABLET, FILM COATED ORAL
COMMUNITY

## 2025-01-29 RX ORDER — SEMAGLUTIDE 2.68 MG/ML
INJECTION, SOLUTION SUBCUTANEOUS
COMMUNITY
Start: 2024-12-03

## 2025-01-29 RX ORDER — FLUCONAZOLE 150 MG/1
150 TABLET ORAL
Qty: 4 TABLET | Refills: 0 | Status: SHIPPED | OUTPATIENT
Start: 2025-01-29

## 2025-01-29 RX ORDER — AMLODIPINE BESYLATE 2.5 MG/1
1 TABLET ORAL
COMMUNITY
End: 2025-01-29

## 2025-01-29 RX ORDER — METFORMIN HYDROCHLORIDE 500 MG/1
500 TABLET, EXTENDED RELEASE ORAL DAILY
COMMUNITY
End: 2025-01-29

## 2025-01-29 RX ORDER — DOXYCYCLINE 100 MG/1
100 CAPSULE ORAL 2 TIMES DAILY
COMMUNITY
Start: 2025-01-14 | End: 2025-01-29

## 2025-01-29 ASSESSMENT — ENCOUNTER SYMPTOMS
FEVER: 0
DIZZINESS: 1
SHORTNESS OF BREATH: 0
WEAKNESS: 0
PALPITATIONS: 0
NAUSEA: 0
HEADACHES: 1

## 2025-02-20 ENCOUNTER — OFFICE VISIT (OUTPATIENT)
Dept: CARDIOLOGY | Facility: MEDICAL CENTER | Age: 75
End: 2025-02-20
Attending: INTERNAL MEDICINE
Payer: MEDICARE

## 2025-02-20 VITALS
OXYGEN SATURATION: 97 % | BODY MASS INDEX: 28.35 KG/M2 | WEIGHT: 228 LBS | SYSTOLIC BLOOD PRESSURE: 110 MMHG | HEIGHT: 75 IN | RESPIRATION RATE: 16 BRPM | DIASTOLIC BLOOD PRESSURE: 60 MMHG | HEART RATE: 88 BPM

## 2025-02-20 DIAGNOSIS — G44.89 OTHER HEADACHE SYNDROME: ICD-10-CM

## 2025-02-20 DIAGNOSIS — I10 PRIMARY HYPERTENSION: ICD-10-CM

## 2025-02-20 DIAGNOSIS — R06.83 SNORING: ICD-10-CM

## 2025-02-20 DIAGNOSIS — I25.10 CORONARY ARTERY DISEASE DUE TO CALCIFIED CORONARY LESION: ICD-10-CM

## 2025-02-20 DIAGNOSIS — E78.5 DYSLIPIDEMIA: ICD-10-CM

## 2025-02-20 DIAGNOSIS — I25.84 CORONARY ARTERY DISEASE DUE TO CALCIFIED CORONARY LESION: ICD-10-CM

## 2025-02-20 PROCEDURE — 3078F DIAST BP <80 MM HG: CPT | Performed by: INTERNAL MEDICINE

## 2025-02-20 PROCEDURE — 99213 OFFICE O/P EST LOW 20 MIN: CPT | Performed by: INTERNAL MEDICINE

## 2025-02-20 PROCEDURE — 3074F SYST BP LT 130 MM HG: CPT | Performed by: INTERNAL MEDICINE

## 2025-02-20 PROCEDURE — 99214 OFFICE O/P EST MOD 30 MIN: CPT | Performed by: INTERNAL MEDICINE

## 2025-02-20 RX ORDER — CARVEDILOL 25 MG/1
12.5 TABLET ORAL 2 TIMES DAILY WITH MEALS
Qty: 60 TABLET | Refills: 2 | Status: SHIPPED | OUTPATIENT
Start: 2025-02-20

## 2025-02-20 NOTE — PROGRESS NOTES
[facility]    PatientName:Dejan SagemoreDate: 2025  :1950    74 y.o.PCP:Ariel Santamaria D.O.  MRN:[id number]        Problems and Plans    Coronary artery disease due to calcified coronary lesion  Regarding his nuclear medicine stress test demonstrating a small fixed perfusion defect in the inferior distribution at this time likely represents an old myocardial infarction.  He remains on appropriate medical therapy for secondary prevention and I would not recommend escalation in his current therapy or any further imaging at this time.  This may have contributed to some of his noted nonsustained asymptomatic ventricular tachycardia during nocturnal hours but I am more concerned about sleep apnea or sleep disordered breathing.  His overall STOP-BANG score is greater than 3 and I recommended a home sleep study as part of his ongoing evaluation    Dyslipidemia  Continued ongoing statin therapy    Primary hypertension  Blood pressures currently well-controlled however I am concerned that he may not be receiving the level of cerebral perfusion given that he is having a consistent headache and has noted microvascular disease based on his self reporting on his MRI in which the report is not readily available.  At this time I would recommend stopping his hydrochlorothiazide and decreasing his carvedilol to 12.5 mg twice daily.  He is amenable to this current course of action.    Other headache syndrome  Regarding his overall headache this is likely multifactorial and at this time we will try and increase cerebral perfusion as possible means for some gait instability and concern for overall persistent headache.  I suspect that he will be best served by seeing neurology in which he has an impending appointment.  Not unreasonable to check a sleep study as part of his overall cardiovascular health and this has been ordered based on his current STOP-BANG score.  He will  also start to treat for atypical allergies with subsequent nasal topical steroids sprays    Return in about 3 months (around 5/20/2025).      Encounter    Reason for Visit / Chief Complaint: Tension    HPI    74-year-old male with known history of hypertension, insulin-dependent diabetes, nonobstructive coronary artery disease, chronic right bundle branch block benign positional peripheral vertigo presents to clinic for ongoing management of his nonobstructive coronary artery disease and hypertension and review of recent cardiovascular testing.    Currently, he notes he is feeling well but has been noticing a persistent headache that has been ongoing with subsequent gait instability.  He questions if this may be medication related.  He has had formal imaging and was noted to have microvascular disease.    Most recent cardiovascular testing consists of echocardiogram performed on January 29, 2025 demonstrating mild concentric left ventricular hypertrophy with preserved ejection fraction of 60 to 65% aortic valve sclerosis without significant stenosis and mild aortic valve insufficiency.    Zio patch monitor demonstrated predominant sinus rhythm with a bundle branch block/intraventricular conduction delay, asymptomatic nonsustained ventricular tachycardia occurring during nocturnal hours presumptive during sleep asymptomatic paroxysmal atrial tachycardia.    Nuclear medicine stress test on December 18, 2024 demonstrated a small fixed perfusion defect in the mid inferior wall suggestive of prior myocardial infarction with a preserved ejection fraction of 64% no EKG evidence of ischemia    Past Medical History  Past Medical History:   Diagnosis Date    BBB (bundle branch block)     Diabetes     insulin and oral medication    Heart burn     Hyperlipidemia     Hypertension     Indigestion     Pain     knee, shoulder    Pneumonia 1974    Psychiatric problem     depression    Renal calculus 7/2013    kidney stones    Skin  rash 3/16/2022    Snoring     sleep apnea questionairre completed     Past Surgical History  Past Surgical History:   Procedure Laterality Date    RECOVERY  5/13/2014    [facility]    OTHER  2/2014    right knee    KNEE ARTHROSCOPY  8/22/2013    Performed by Maurisio Alfaro M.D. at SURGERY [facility]    MENISCECTOMY, KNEE, MEDIAL  8/22/2013    Performed by Maurisio Alfaro M.D. at SURGERY [facility] ORS    SHOULDER ARTHROTOMY  2003    right    KNEE ARTHROSCOPY  1990    left    KNEE ARTHROSCOPY  1985    right    ORIF, ANKLE  1984    left    ORIF, KNEE  1970    left    TONSILLECTOMY  as child     Social History  Social History     Socioeconomic History    Marital status:      Spouse name: Not on file    Number of children: Not on file    Years of education: Not on file    Highest education level: Not on file   Occupational History    Not on file   Tobacco Use    Smoking status: Never    Smokeless tobacco: Never   Vaping Use    Vaping status: Never Used   Substance and Sexual Activity    Alcohol use: Yes     Alcohol/week: 1.2 oz     Types: 2 Standard drinks or equivalent per week     Comment: 1x/week    Drug use: No    Sexual activity: Not on file   Other Topics Concern    Not on file   Social History Narrative    Not on file     Social Drivers of Health     Financial Resource Strain: Not on file   Food Insecurity: Not on file   Transportation Needs: Not on file   Physical Activity: Not on file   Stress: Not on file   Social Connections: Not on file   Intimate Partner Violence: Not on file   Housing Stability: Not on file     Past Family History  Family History   Problem Relation Age of Onset    Heart Disease Mother     Hypertension Mother     Heart Disease Father     Hypertension Father     Atrial fibrillation Brother      Medication(s)    Current Outpatient Medications:     carvedilol, 12.5 mg, Oral, BID WITH MEALS, Taking    Ozempic  "(2 MG/DOSE), INJECT 2MG UNDER THE SKIN ONCE A WEEK, Taking    atorvastatin, 1 Tablet, Oral, QDAY, Taking    fluconazole, 150 mg, Oral, Q7 DAYS, Taking    BD Pen Needle Grace U/F, 200 Units, Does not apply, DAILY AT 1800, Taking    Tresiba FlexTouch, , Taking    ezetimibe, 10 mg, Oral, DAILY, Taking    irbesartan, 300 mg, Oral, DAILY, Taking    metFORMIN, TAKE 1 TABLET BY MOUTH IN THE MORNING AND 2 TABLETS IN THE EVENING, Taking    SSD, APPLY THIN LAYER TO WOUND EVERY DAY, Taking    zolpidem, 10 mg, Oral, HS PRN, PRN    gabapentin, 800 mg, Oral, TID, Taking    FreeStyle Shabbir 14 Day Sensor, FREESTYLE SHABBIR 14 DAY SENSOR, Taking    BD Pen Needle Micro U/F, , Taking    aspirin, 81 mg, Oral, DAILY, Taking  Allergies  Penicillins and Norvasc [amlodipine]    Review of Systems    A comprehensive 10 system review was conducted and is negative except as noted above in the HPI or here.      Vital Signs  /60 (BP Location: Left arm, Patient Position: Sitting, BP Cuff Size: Adult)   Pulse 88   Resp 16   Ht 1.892 m (6' 2.5\")   Wt 103 kg (228 lb)   SpO2 97%   BMI 28.88 kg/m²     Physical Exam  Constitutional:       Appearance: Normal appearance. He is obese.   HENT:      Head: Normocephalic and atraumatic.      Mouth/Throat:      Mouth: Mucous membranes are moist.      Pharynx: Oropharynx is clear.   Eyes:      Extraocular Movements: Extraocular movements intact.      Conjunctiva/sclera: Conjunctivae normal.   Cardiovascular:      Rate and Rhythm: Normal rate and regular rhythm.      Pulses: Normal pulses.      Heart sounds: Normal heart sounds. No murmur heard.     No friction rub. No gallop.   Pulmonary:      Effort: Pulmonary effort is normal.      Breath sounds: Normal breath sounds.   Abdominal:      General: Bowel sounds are normal.      Palpations: Abdomen is soft.   Musculoskeletal:         General: Normal range of motion.      Cervical back: Normal range of motion and neck supple.   Skin:     General: Skin is " warm and dry.   Neurological:      General: No focal deficit present.      Mental Status: He is alert and oriented to person, place, and time. Mental status is at baseline.   Psychiatric:         Mood and Affect: Mood normal.         Behavior: Behavior normal.         Thought Content: Thought content normal.         Judgment: Judgment normal.         Lab Results   Component Value Date/Time    TSHULTRASEN 1.190 03/11/2013 0821        Lab Results   Component Value Date/Time    FREET4 1.05 03/11/2013 0821        Lab Results   Component Value Date/Time    HBA1C 6.3 (A) 11/04/2024 12:44 AM       Lab Results   Component Value Date/Time    CHOLSTRLTOT 139 01/08/2025 06:07 AM    CHOLSTRLTOT 144 06/17/2013 07:12 AM    LDL 71 06/17/2013 07:12 AM    HDL 36 (L) 01/08/2025 06:07 AM    HDL 31 (A) 06/17/2013 07:12 AM    TRIGLYCERIDE 132 01/08/2025 06:07 AM    TRIGLYCERIDE 211 (H) 06/17/2013 07:12 AM         Lab Results   Component Value Date/Time    SODIUM 141 01/08/2025 06:07 AM    SODIUM 135 07/07/2020 05:40 PM    POTASSIUM 3.8 01/08/2025 06:07 AM    POTASSIUM 4.0 07/07/2020 05:40 PM    CHLORIDE 101 01/08/2025 06:07 AM    CHLORIDE 97 07/07/2020 05:40 PM    CO2 26 01/08/2025 06:07 AM    CO2 21 07/07/2020 05:40 PM    GLUCOSE 109 (H) 01/08/2025 06:07 AM    GLUCOSE 181 (H) 07/07/2020 05:40 PM    BUN 29 (H) 01/08/2025 06:07 AM    BUN 29 (H) 07/07/2020 05:40 PM    CREATININE 1.00 01/08/2025 06:07 AM    CREATININE 1.21 07/07/2020 05:40 PM    BUNCREATRAT 29 (H) 01/08/2025 06:07 AM       Lab Results   Component Value Date/Time    ALKPHOSPHAT 105 01/08/2025 06:07 AM    ALKPHOSPHAT 76 07/07/2020 05:40 PM    ASTSGOT 21 01/08/2025 06:07 AM    ASTSGOT 15 07/07/2020 05:40 PM    ALTSGPT 18 01/08/2025 06:07 AM    ALTSGPT 17 07/07/2020 05:40 PM    TBILIRUBIN 0.5 01/08/2025 06:07 AM    TBILIRUBIN 1.0 07/07/2020 05:40 PM       Echocardiogram as noted above,    Total patient time was estimated to be 30 minutes consisting of chart review, direct  patient interaction, medication renewal, plan development and overall communication with the cardiovascular team.        Electronically signed by:   Vamshi Delgado DO, MPH  [facility]    Portions of this note were completed using voice recognition software (Dragon Naturally speaking software) . Occasional transcription errors may have escaped proof reading. I have made every reasonable attempt to correct obvious errors, but I expect that there are errors of grammar and possibly content that I did not discover before finalizing the note.

## 2025-02-20 NOTE — ASSESSMENT & PLAN NOTE
Regarding his overall headache this is likely multifactorial and at this time we will try and increase cerebral perfusion as possible means for some gait instability and concern for overall persistent headache.  I suspect that he will be best served by seeing neurology in which he has an impending appointment.  Not unreasonable to check a sleep study as part of his overall cardiovascular health and this has been ordered based on his current STOP-BANG score.  He will also start to treat for atypical allergies with subsequent nasal topical steroids sprays

## 2025-02-20 NOTE — PATIENT INSTRUCTIONS
Follow up in 3 months  Decrease Coreg 12.5mg twice per day  Stop HCTZ  Continue current medications  Call with questions

## 2025-02-20 NOTE — Clinical Note
REFERRAL APPROVAL NOTICE         Sent on February 20, 2025                   Flip Mooney  [address] [place] [address]                   Dear Joao Mooney,    After a careful review of the medical information and benefit coverage, [facility] has processed your referral. See below for additional details.    If applicable, you must be actively enrolled with your insurance for coverage of the authorized service. If you have any questions regarding your coverage, please contact your insurance directly.    REFERRAL INFORMATION   Referral #:  [id number]  Referred-To Department    Referred-By Provider:  Pulmonary and Sleep Medicine    Vamshi Delgado D.O.   [facility]      [address]  [address]  [place] [address]  [phone] [address]  [place] [id number] [phone]    Referral Start Date:  02/20/2025  Referral End Date:   02/20/2026             SCHEDULING  If you do not already have an appointment, please call [phone] to make an appointment.     MORE INFORMATION  If you do not already have a [employer] account, sign up at: [email or web address]  You can access your medical information, make appointments, see lab results, billing information, and more.  If you have questions regarding this referral, please contact  the [facility] Referrals department at:             [phone]. Monday - Friday 8:00AM - 5:00PM.     Sincerely,    [facility]

## 2025-02-20 NOTE — ASSESSMENT & PLAN NOTE
Blood pressures currently well-controlled however I am concerned that he may not be receiving the level of cerebral perfusion given that he is having a consistent headache and has noted microvascular disease based on his self reporting on his MRI in which the report is not readily available.  At this time I would recommend stopping his hydrochlorothiazide and decreasing his carvedilol to 12.5 mg twice daily.  He is amenable to this current course of action.

## 2025-02-20 NOTE — ASSESSMENT & PLAN NOTE
Regarding his nuclear medicine stress test demonstrating a small fixed perfusion defect in the inferior distribution at this time likely represents an old myocardial infarction.  He remains on appropriate medical therapy for secondary prevention and I would not recommend escalation in his current therapy or any further imaging at this time.  This may have contributed to some of his noted nonsustained asymptomatic ventricular tachycardia during nocturnal hours but I am more concerned about sleep apnea or sleep disordered breathing.  His overall STOP-BANG score is greater than 3 and I recommended a home sleep study as part of his ongoing evaluation

## 2025-03-10 ENCOUNTER — TELEPHONE (OUTPATIENT)
Dept: HEALTH INFORMATION MANAGEMENT | Facility: OTHER | Age: 75
End: 2025-03-10
Payer: MEDICARE

## 2025-03-11 ENCOUNTER — APPOINTMENT (OUTPATIENT)
Dept: INTERNAL MEDICINE | Facility: OTHER | Age: 75
End: 2025-03-11
Payer: MEDICARE

## 2025-03-11 RX ORDER — GABAPENTIN 800 MG/1
800 TABLET ORAL 3 TIMES DAILY
Qty: 90 TABLET | Refills: 2 | Status: SHIPPED | OUTPATIENT
Start: 2025-03-11

## 2025-03-11 NOTE — TELEPHONE ENCOUNTER
Received request via: Pharmacy    Was the patient seen in the last year in this department? Yes    Does the patient have an active prescription (recently filled or refills available) for medication(s) requested? No    Pharmacy Name: [id number] - [place], [place] - [address] AT [facility]     Does the patient have intermediate Plus and need 100-day supply? (This applies to ALL medications) Patient does not have SCP

## 2025-03-18 ENCOUNTER — APPOINTMENT (OUTPATIENT)
Dept: INTERNAL MEDICINE | Facility: OTHER | Age: 75
End: 2025-03-18
Payer: MEDICARE

## 2025-03-21 ENCOUNTER — TELEPHONE (OUTPATIENT)
Dept: INTERNAL MEDICINE | Facility: OTHER | Age: 75
End: 2025-03-21

## 2025-03-21 ENCOUNTER — OFFICE VISIT (OUTPATIENT)
Dept: INTERNAL MEDICINE | Facility: OTHER | Age: 75
End: 2025-03-21
Payer: MEDICARE

## 2025-03-21 VITALS
DIASTOLIC BLOOD PRESSURE: 76 MMHG | HEART RATE: 73 BPM | WEIGHT: 223.8 LBS | OXYGEN SATURATION: 93 % | SYSTOLIC BLOOD PRESSURE: 131 MMHG | TEMPERATURE: 97.7 F | BODY MASS INDEX: 28.35 KG/M2

## 2025-03-21 DIAGNOSIS — I25.84 CORONARY ARTERY DISEASE DUE TO CALCIFIED CORONARY LESION: ICD-10-CM

## 2025-03-21 DIAGNOSIS — E11.41 TYPE 2 DIABETES MELLITUS WITH DIABETIC MONONEUROPATHY, WITH LONG-TERM CURRENT USE OF INSULIN (HCC): ICD-10-CM

## 2025-03-21 DIAGNOSIS — Z79.4 TYPE 2 DIABETES MELLITUS WITH DIABETIC MONONEUROPATHY, WITH LONG-TERM CURRENT USE OF INSULIN (HCC): ICD-10-CM

## 2025-03-21 DIAGNOSIS — I25.10 CORONARY ARTERY DISEASE DUE TO CALCIFIED CORONARY LESION: ICD-10-CM

## 2025-03-21 DIAGNOSIS — R26.89 BALANCE PROBLEMS: ICD-10-CM

## 2025-03-21 DIAGNOSIS — E11.42 DIABETIC POLYNEUROPATHY ASSOCIATED WITH TYPE 2 DIABETES MELLITUS (HCC): ICD-10-CM

## 2025-03-21 PROCEDURE — 99214 OFFICE O/P EST MOD 30 MIN: CPT | Mod: GC

## 2025-03-21 PROCEDURE — 3075F SYST BP GE 130 - 139MM HG: CPT | Mod: GC

## 2025-03-21 PROCEDURE — 3078F DIAST BP <80 MM HG: CPT | Mod: GC

## 2025-03-21 RX ORDER — ATORVASTATIN CALCIUM 80 MG/1
80 TABLET, FILM COATED ORAL EVERY EVENING
COMMUNITY
Start: 2025-02-09

## 2025-03-21 ASSESSMENT — ENCOUNTER SYMPTOMS
ABDOMINAL PAIN: 0
HEADACHES: 1
VOMITING: 0
DIZZINESS: 1
SHORTNESS OF BREATH: 0

## 2025-03-21 NOTE — PROGRESS NOTES
History of Present Illness:   Dejan Mooney is a 74 y.o. male who presents to follow-up on balance problems.  He states that he has been seeing physical therapy for his BPPV and they have been doing some exercises which helps balance problems slightly.  However, they are not 100% improved.  He got an MRI done from [facility] and has scheduled follow-up with neurology.      Past Medical History:   Diagnosis Date    BBB (bundle branch block)     Diabetes     insulin and oral medication    Heart burn     Hyperlipidemia     Hypertension     Indigestion     Pain     knee, shoulder    Pneumonia 1974    Psychiatric problem     depression    Renal calculus 7/2013    kidney stones    Skin rash 3/16/2022    Snoring     sleep apnea questionairre completed       Past Surgical History:   Procedure Laterality Date    RECOVERY  5/13/2014    Performed by Cath-Recovery Surgery at SURGERY SAME DAY [facility] ORS    OTHER  2/2014    right knee    KNEE ARTHROSCOPY  8/22/2013    Performed by Maurisio Alfaro M.D. at SURGERY [facility] ORS    MENISCECTOMY, KNEE, MEDIAL  8/22/2013    Performed by Maurisio Alfaro M.D. at SURGERY [facility] ORS    SHOULDER ARTHROTOMY  2003    right    KNEE ARTHROSCOPY  1990    left    KNEE ARTHROSCOPY  1985    right    ORIF, ANKLE  1984    left    ORIF, KNEE  1970    left    TONSILLECTOMY  as child       Family History   Problem Relation Age of Onset    Heart Disease Mother     Hypertension Mother     Heart Disease Father     Hypertension Father     Atrial fibrillation Brother        Social History     Socioeconomic History    Marital status:      Spouse name: Not on file    Number of children: Not on file    Years of education: Not on file    Highest education level: Not on file   Occupational History    Not on file   Tobacco Use    Smoking status: Never    Smokeless tobacco: Never   Vaping Use    Vaping status: Never Used   Substance and Sexual Activity     Alcohol use: Yes     Alcohol/week: 1.2 oz     Types: 2 Standard drinks or equivalent per week     Comment: 1x/week    Drug use: No    Sexual activity: Not on file   Other Topics Concern    Not on file   Social History Narrative    Not on file     Social Drivers of Health     Financial Resource Strain: Not on file   Food Insecurity: Not on file   Transportation Needs: Not on file   Physical Activity: Not on file   Stress: Not on file   Social Connections: Not on file   Intimate Partner Violence: Not on file   Housing Stability: Not on file       Current Outpatient Medications   Medication Sig Dispense Refill    atorvastatin (LIPITOR) 80 MG tablet Take 80 mg by mouth every evening.      gabapentin (NEURONTIN) 800 MG tablet Take 1 Tablet by mouth 3 times a day. 90 Tablet 2    carvedilol (COREG) 25 MG Tab Take 0.5 Tablets by mouth 2 times a day with meals. 60 Tablet 2    OZEMPIC, 2 MG/DOSE, 8 MG/3ML Solution Pen-injector INJECT 2MG UNDER THE SKIN ONCE A WEEK      fluconazole (DIFLUCAN) 150 MG tablet Take 1 Tablet by mouth every 7 days. 4 Tablet 0    Insulin Pen Needle 32 G x 4 mm (BD PEN NEEDLE ANNETTE U/F) 200 Units every day at 6 PM. 100 Each 2    ezetimibe (ZETIA) 10 MG Tab Take 1 Tablet by mouth every day. 100 Tablet 3    irbesartan (AVAPRO) 300 MG Tab Take 1 Tablet by mouth every day. 100 Tablet 3    metFORMIN (GLUCOPHAGE) 500 MG Tab TAKE 1 TABLET BY MOUTH IN THE MORNING AND 2 TABLETS IN THE EVENING 270 Tablet 0    zolpidem (AMBIEN) 10 MG Tab Take 10 mg by mouth at bedtime as needed for Sleep.      Continuous Blood Gluc Sensor (FREESTYLE HANH 14 DAY SENSOR) Misc FREESTYLE HANH 14 DAY SENSOR      BD PEN NEEDLE MICRO U/F 32G X 6 MM Misc       ASPIRIN 81 MG PO TABS Take 81 mg by mouth every day.      Insulin Degludec (TRESIBA FLEXTOUCH) 100 UNIT/ML Solution Pen-injector        No current facility-administered medications for this visit.       Allergies   Allergen Reactions    Penicillins Anaphylaxis, Hives, Swelling  and Shortness of Breath    Norvasc [Amlodipine]      Leg swelling       Review of Systems:  Review of Systems   Respiratory:  Negative for shortness of breath.    Cardiovascular:  Negative for chest pain.   Gastrointestinal:  Negative for abdominal pain and vomiting.   Neurological:  Positive for dizziness and headaches.        Medications:     Current Outpatient Medications:     atorvastatin, 80 mg, Oral, Q EVENING, Taking    gabapentin, 800 mg, Oral, TID, Taking    carvedilol, 12.5 mg, Oral, BID WITH MEALS, Taking    Ozempic (2 MG/DOSE), INJECT 2MG UNDER THE SKIN ONCE A WEEK, Taking    fluconazole, 150 mg, Oral, Q7 DAYS, Taking    BD Pen Needle Grace U/F, 200 Units, Does not apply, DAILY AT 1800, Taking    ezetimibe, 10 mg, Oral, DAILY, Taking    irbesartan, 300 mg, Oral, DAILY, Taking    metFORMIN, TAKE 1 TABLET BY MOUTH IN THE MORNING AND 2 TABLETS IN THE EVENING, Taking    zolpidem, 10 mg, Oral, HS PRN, PRN    FreeStyle Shabbir 14 Day Sensor, FREESTYLE SHABBIR 14 DAY SENSOR, Taking    BD Pen Needle Micro U/F, , Taking    aspirin, 81 mg, Oral, DAILY, Taking    Tresiba FlexTouch,      Objective:  Vitals:   /76 (BP Location: Left arm, Patient Position: Sitting, BP Cuff Size: Adult)   Pulse 73   Temp 36.5 °C (97.7 °F) (Temporal)   Wt 102 kg (223 lb 12.8 oz)   SpO2 93%  Body mass index is 28.35 kg/m².    Physical Exam  Constitutional:       General: He is not in acute distress.  HENT:      Head: Normocephalic and atraumatic.   Eyes:      Conjunctiva/sclera: Conjunctivae normal.   Cardiovascular:      Heart sounds: Normal heart sounds.   Pulmonary:      Breath sounds: Normal breath sounds.   Abdominal:      General: There is no distension.   Neurological:      General: No focal deficit present.      Mental Status: He is oriented to person, place, and time.      Gait: Gait normal.          Results:    Lab Results   Component Value Date/Time    CHOLSTRLTOT 139 01/08/2025 06:07 AM    CHOLSTRLTOT 144 06/17/2013  07:12 AM    LDL 71 06/17/2013 07:12 AM    HDL 36 (L) 01/08/2025 06:07 AM    HDL 31 (A) 06/17/2013 07:12 AM    TRIGLYCERIDE 132 01/08/2025 06:07 AM    TRIGLYCERIDE 211 (H) 06/17/2013 07:12 AM       Lab Results   Component Value Date/Time    SODIUM 141 01/08/2025 06:07 AM    SODIUM 135 07/07/2020 05:40 PM    POTASSIUM 3.8 01/08/2025 06:07 AM    POTASSIUM 4.0 07/07/2020 05:40 PM    CHLORIDE 101 01/08/2025 06:07 AM    CHLORIDE 97 07/07/2020 05:40 PM    CO2 26 01/08/2025 06:07 AM    CO2 21 07/07/2020 05:40 PM    GLUCOSE 109 (H) 01/08/2025 06:07 AM    GLUCOSE 181 (H) 07/07/2020 05:40 PM    BUN 29 (H) 01/08/2025 06:07 AM    BUN 29 (H) 07/07/2020 05:40 PM    CREATININE 1.00 01/08/2025 06:07 AM    CREATININE 1.21 07/07/2020 05:40 PM    BUNCREATRAT 29 (H) 01/08/2025 06:07 AM     Lab Results   Component Value Date/Time    ALKPHOSPHAT 105 01/08/2025 06:07 AM    ALKPHOSPHAT 76 07/07/2020 05:40 PM    ASTSGOT 21 01/08/2025 06:07 AM    ASTSGOT 15 07/07/2020 05:40 PM    ALTSGPT 18 01/08/2025 06:07 AM    ALTSGPT 17 07/07/2020 05:40 PM    TBILIRUBIN 0.5 01/08/2025 06:07 AM    TBILIRUBIN 1.0 07/07/2020 05:40 PM        Lab Results   Component Value Date/Time    WBC 6.0 06/17/2024 08:05 AM    WBC 13.1 (H) 07/07/2020 05:40 PM    RBC 4.16 06/17/2024 08:05 AM    RBC 4.89 07/07/2020 05:40 PM    HEMOGLOBIN 12.6 (L) 06/17/2024 08:05 AM    HEMOGLOBIN 14.6 07/07/2020 05:40 PM    HEMATOCRIT 38.2 06/17/2024 08:05 AM    HEMATOCRIT 43.8 07/07/2020 05:40 PM    MCV 92 06/17/2024 08:05 AM    MCV 89.6 07/07/2020 05:40 PM    MCH 30.3 06/17/2024 08:05 AM    MCH 29.9 07/07/2020 05:40 PM    MCHC 33.0 06/17/2024 08:05 AM    MCHC 33.3 (L) 07/07/2020 05:40 PM    MPV 9.2 07/07/2020 05:40 PM    NEUTSPOLYS 88.40 (H) 07/07/2020 05:40 PM    LYMPHOCYTES 4.10 (L) 07/07/2020 05:40 PM    MONOCYTES 6.40 07/07/2020 05:40 PM    EOSINOPHILS 0.00 07/07/2020 05:40 PM    BASOPHILS 0.30 07/07/2020 05:40 PM    HYPOCHROMIA 1+ 06/17/2013 07:12 AM        Assessment and  Plan:    #Dizziness  Patient with dizziness described as balance problems for the past 6 months that been worsening. When he will walk from the store to his car he feels like he will be tilting left and right will have to hold onto his wife for balance.  He has associated lightheadedness while getting up as well as severe headaches that are occurring more frequently. No syncopal episodes, falls, vision changes, or hearing changes  He does have history of BPPV but notes that this feels different.  It is not a vertigo like sensation, thus less likely inner ear problem, like Ménière's.  He also does not have any hearing change and saw an audiologist who told him that his hearing was perfect  Possibly worsening of his peripheral neuropathy  Vitamin B12 within normal limits.  RPR negative  No gait abnormalities noted when testing in office  Orthostatics negative  His balance issues have been getting slightly better with PT but are not 100% improved.  There was suspicion that it might be due to hypoperfusion and he was lowered on his antihypertensives which he feels have not been helping relieve his symptoms as much  -MRI brain obtained from [facility] and per report from his phone showed no acute intracranial abnormalities and chronic small vessel disease.  Records will be requested  - Has follow-up with neurology upcoming    #T2DM  #Neuropathy  #Microalbuminemia  Following with endocrinology  Recent A1c (11/2024): 6.3%  Monofilament test (11/2024): No sensation felt in all points  No open wounds   Following with [facility] for retinal screening  Microalbumin/creatinine ratio (1/2025): 67, on irbesartan  Vitamin B12 (1/2025): Within normal limits while on metformin  -Repeat A1c ordered to be done prior to his endocrinology     **Chronic conditions:      #CAD  #Dyslipidemia  Following with cardiology.  Goal LDL less than 55   LDL (1/2025): 79  On aspirin, atorvastatin, Zetia     #Hypertension  Following  with cardiology   HCTZ was stopped and carvedilol was reduced to 12.5 mg twice daily due to suspicion for hypoperfusion of his brain  BP still well-controlled after the change     #Insomnia  Following pain management on Ambien             #Diarrhea resolved  Likely secondary to malabsorption  Has family history of celiac to his brother being diagnosed in his 70s  Last colonoscopy in 2021 showed 1 diminutive sessile polyp in the rectosigmoid colon with recommendation for repeat in 5 to 10 years  Gastric emptying study normal  Stool tests negative    Follow Up:  Return in about 3 months (around 6/21/2025).

## 2025-03-21 NOTE — LETTER
[facility]  Ariel Santamaria D.O.  [address] [place] [address]  Fax: [phone]   Authorization for Release/Disclosure of   Protected Health Information   Name: TRAVON MOONEY : 1950 SSN: [id number]   Address: [address] [place] [address] [address] [place] [address] Phone:    There are no phone numbers on file.   I authorize the entity listed below to release/disclose the PHI below to:   [facility]/Ariel Santamaria D.O. and Ariel Santamaria D.O.   Provider or Entity Name:     Address   City, State, [facility]   Phone:      Fax:     Reason for request: continuity of care   Information to be released:    [  ] LAST COLONOSCOPY,  including any PATH REPORT and follow-up  [  ] LAST FIT/COLOGUARD RESULT [  ] LAST DEXA  [  ] LAST MAMMOGRAM  [  ] LAST PAP  [  ] LAST LABS [  ] RETINA EXAM REPORT  [  ] IMMUNIZATION RECORDS  [  ] Release all info      [  ] Check here and initial the line next to each item to release ALL health information INCLUDING  _____ Care and treatment for drug and / or alcohol abuse  _____ HIV testing, infection status, or AIDS  _____ Genetic Testing    DATES OF SERVICE OR TIME PERIOD TO BE DISCLOSED: _____________  I understand and acknowledge that:  * This Authorization may be revoked at any time by you in writing, except if your health information has already been used or disclosed.  * Your health information that will be used or disclosed as a result of you signing this authorization could be re-disclosed by the recipient. If this occurs, your re-disclosed health information may no longer be protected by State or Federal laws.  * You may refuse to sign this Authorization. Your refusal will not affect your ability to obtain treatment.  * This Authorization becomes effective upon signing and will  on (date) __________.      If no date is indicated, this Authorization will  one (1) year from the signature date.    Name: Travon Mooney  Signature: Date:   3/21/2025     PLEASE FAX  REQUESTED RECORDS BACK TO: [phone]

## 2025-03-31 ENCOUNTER — TELEPHONE (OUTPATIENT)
Dept: ENDOCRINOLOGY | Facility: MEDICAL CENTER | Age: 75
End: 2025-03-31
Payer: MEDICARE

## 2025-04-02 LAB
ALBUMIN SERPL-MCNC: 4.3 G/DL (ref 3.8–4.8)
ALP SERPL-CCNC: 98 IU/L (ref 44–121)
ALT SERPL-CCNC: 20 IU/L (ref 0–44)
AST SERPL-CCNC: 21 IU/L (ref 0–40)
BILIRUB SERPL-MCNC: 0.6 MG/DL (ref 0–1.2)
BUN SERPL-MCNC: 31 MG/DL (ref 8–27)
BUN/CREAT SERPL: 29 (ref 10–24)
CALCIUM SERPL-MCNC: 9.6 MG/DL (ref 8.6–10.2)
CHLORIDE SERPL-SCNC: 101 MMOL/L (ref 96–106)
CHOLEST SERPL-MCNC: 96 MG/DL (ref 100–199)
CO2 SERPL-SCNC: 26 MMOL/L (ref 20–29)
CREAT SERPL-MCNC: 1.07 MG/DL (ref 0.76–1.27)
EGFRCR SERPLBLD CKD-EPI 2021: 74 ML/MIN/1.73
GLOBULIN SER CALC-MCNC: 2.3 G/DL (ref 1.5–4.5)
GLUCOSE SERPL-MCNC: 92 MG/DL (ref 70–99)
HBA1C MFR BLD: 6.4 % (ref 4.8–5.6)
HDLC SERPL-MCNC: 31 MG/DL
LDL CALC COMMENT:: ABNORMAL
LDLC SERPL CALC-MCNC: 40 MG/DL (ref 0–99)
POTASSIUM SERPL-SCNC: 4.7 MMOL/L (ref 3.5–5.2)
PROT SERPL-MCNC: 6.6 G/DL (ref 6–8.5)
SODIUM SERPL-SCNC: 143 MMOL/L (ref 134–144)
TRIGL SERPL-MCNC: 146 MG/DL (ref 0–149)
TSH SERPL DL<=0.005 MIU/L-ACNC: 0.79 UIU/ML (ref 0.45–4.5)
VLDLC SERPL CALC-MCNC: 25 MG/DL (ref 5–40)

## 2025-04-08 ENCOUNTER — OFFICE VISIT (OUTPATIENT)
Dept: ENDOCRINOLOGY | Facility: MEDICAL CENTER | Age: 75
End: 2025-04-08
Attending: INTERNAL MEDICINE
Payer: MEDICARE

## 2025-04-08 VITALS
HEART RATE: 84 BPM | BODY MASS INDEX: 27.73 KG/M2 | OXYGEN SATURATION: 97 % | DIASTOLIC BLOOD PRESSURE: 67 MMHG | WEIGHT: 223 LBS | HEIGHT: 75 IN | SYSTOLIC BLOOD PRESSURE: 121 MMHG

## 2025-04-08 DIAGNOSIS — Z79.4 TYPE 2 DIABETES MELLITUS WITH DIABETIC MONONEUROPATHY, WITH LONG-TERM CURRENT USE OF INSULIN (HCC): ICD-10-CM

## 2025-04-08 DIAGNOSIS — I25.10 CORONARY ARTERY DISEASE DUE TO CALCIFIED CORONARY LESION: ICD-10-CM

## 2025-04-08 DIAGNOSIS — E78.5 DYSLIPIDEMIA: ICD-10-CM

## 2025-04-08 DIAGNOSIS — E11.41 TYPE 2 DIABETES MELLITUS WITH DIABETIC MONONEUROPATHY, WITH LONG-TERM CURRENT USE OF INSULIN (HCC): ICD-10-CM

## 2025-04-08 DIAGNOSIS — I25.84 CORONARY ARTERY DISEASE DUE TO CALCIFIED CORONARY LESION: ICD-10-CM

## 2025-04-08 PROCEDURE — 99211 OFF/OP EST MAY X REQ PHY/QHP: CPT | Mod: 27 | Performed by: INTERNAL MEDICINE

## 2025-04-08 PROCEDURE — 3078F DIAST BP <80 MM HG: CPT | Performed by: INTERNAL MEDICINE

## 2025-04-08 PROCEDURE — 95250 CONT GLUC MNTR PHYS/QHP EQP: CPT | Performed by: INTERNAL MEDICINE

## 2025-04-08 PROCEDURE — 99214 OFFICE O/P EST MOD 30 MIN: CPT | Performed by: INTERNAL MEDICINE

## 2025-04-08 PROCEDURE — 3074F SYST BP LT 130 MM HG: CPT | Performed by: INTERNAL MEDICINE

## 2025-04-10 ENCOUNTER — TELEPHONE (OUTPATIENT)
Dept: ENDOCRINOLOGY | Facility: MEDICAL CENTER | Age: 75
End: 2025-04-10
Payer: MEDICARE

## 2025-04-10 RX ORDER — PEN NEEDLE, DIABETIC 32GX 5/32"
200 NEEDLE, DISPOSABLE MISCELLANEOUS DAILY
Qty: 100 EACH | Refills: 2 | Status: SHIPPED | OUTPATIENT
Start: 2025-04-10

## 2025-04-10 RX ORDER — PEN NEEDLE, DIABETIC 32 GX 1/4"
1 NEEDLE, DISPOSABLE MISCELLANEOUS DAILY
Qty: 90 EACH | Refills: 3 | Status: SHIPPED | OUTPATIENT
Start: 2025-04-10 | End: 2026-04-05

## 2025-04-10 RX ORDER — SEMAGLUTIDE 2.68 MG/ML
2 INJECTION, SOLUTION SUBCUTANEOUS
Qty: 9 ML | Refills: 3 | Status: SHIPPED | OUTPATIENT
Start: 2025-04-10 | End: 2026-03-06

## 2025-04-10 RX ORDER — FLASH GLUCOSE SENSOR
1 KIT MISCELLANEOUS
Qty: 6 EACH | Refills: 3 | Status: SHIPPED | OUTPATIENT
Start: 2025-04-10 | End: 2026-03-12

## 2025-04-10 NOTE — PROGRESS NOTES
Established Patient    Patient Care Team:  Ariel Santamaria D.O. as PCP - General (Internal Medicine)  Jamie Pittman M.D. (Endocrinology)  Walter Trujillo M.D. as Cardiologist (Cardiovascular Disease (Cardiology))    Dejan Mooney is a 74 y.o. male who presents today with the following Chief Complaint(s): Follow up for Diagnoses of Type 2 diabetes mellitus with diabetic mononeuropathy, with long-term current use of insulin (HCC) [E11.41, Z79.4], Dyslipidemia, and Coronary artery disease due to calcified coronary lesion were pertinent to this visit.    HPI:  Patient is a 74-year-old male with a history of type 2 diabetes, diabetic neuropathy, dyslipidemia, and coronary artery disease here to establish.    shabbir 2 is 79% in range (11/2024)    Shabbir 2 4/2025  Vl 0%, l 1%, TR 77%, h 21%, vh 1%  No significant lows    4/1/25 A1c 6.4%, ldl 40, tsh 0.792, eGFR 74    Patient denies significant lows  11/2024 A1c 6.3%  Patient follows with ophthalmology for retinal pucker which is stable  Patient follows with cardiology for CAD  Patient is on atorvastatin 80 and Zetia for dyslipidemia    Current regimen  Jardiance 25  Degludec  Semaglutide 2 mg  Metformin 500 mg 3 times daily      ROS:  Per HPI, otherwise: Negative    Past Medical History:   Diagnosis Date    BBB (bundle branch block)     Diabetes     insulin and oral medication    Heart burn     Hyperlipidemia     Hypertension     Indigestion     Pain     knee, shoulder    Pneumonia 1974    Psychiatric problem     depression    Renal calculus 7/2013    kidney stones    Skin rash 3/16/2022    Snoring     sleep apnea questionairre completed     Social History     Tobacco Use    Smoking status: Never    Smokeless tobacco: Never   Vaping Use    Vaping status: Never Used   Substance Use Topics    Alcohol use: Yes     Alcohol/week: 1.2 oz     Types: 2 Standard drinks or equivalent per week     Comment: 1x/week    Drug use: No     Current Outpatient Medications  "  Medication Sig Dispense Refill    atorvastatin (LIPITOR) 80 MG tablet Take 80 mg by mouth every evening.      gabapentin (NEURONTIN) 800 MG tablet Take 1 Tablet by mouth 3 times a day. 90 Tablet 2    carvedilol (COREG) 25 MG Tab Take 0.5 Tablets by mouth 2 times a day with meals. 60 Tablet 2    OZEMPIC, 2 MG/DOSE, 8 MG/3ML Solution Pen-injector INJECT 2MG UNDER THE SKIN ONCE A WEEK      fluconazole (DIFLUCAN) 150 MG tablet Take 1 Tablet by mouth every 7 days. 4 Tablet 0    Insulin Pen Needle 32 G x 4 mm (BD PEN NEEDLE ANNETTE U/F) 200 Units every day at 6 PM. 100 Each 2    Insulin Degludec (TRESIBA FLEXTOUCH) 100 UNIT/ML Solution Pen-injector       ezetimibe (ZETIA) 10 MG Tab Take 1 Tablet by mouth every day. 100 Tablet 3    irbesartan (AVAPRO) 300 MG Tab Take 1 Tablet by mouth every day. 100 Tablet 3    metFORMIN (GLUCOPHAGE) 500 MG Tab TAKE 1 TABLET BY MOUTH IN THE MORNING AND 2 TABLETS IN THE EVENING 270 Tablet 0    zolpidem (AMBIEN) 10 MG Tab Take 10 mg by mouth at bedtime as needed for Sleep.      Continuous Blood Gluc Sensor (FREESTYLE HANH 14 DAY SENSOR) Misc FREESTYLE HANH 14 DAY SENSOR      BD PEN NEEDLE MICRO U/F 32G X 6 MM Misc       ASPIRIN 81 MG PO TABS Take 81 mg by mouth every day.       No current facility-administered medications for this visit.       /67   Pulse 84   Ht 1.892 m (6' 2.5\")   Wt 101 kg (223 lb)   SpO2 97%   BMI 28.25 kg/m²     Physical Exam:   Gen:           Alert and oriented, No apparent distress. Mood and affect appropriate, normal interaction with examiner.   Hearing:     Grossly intact.    Assessment and Plan:     1. Type 2 diabetes mellitus with diabetic mononeuropathy, with long-term current use of insulin (HCC) [E11.41, Z79.4]  Hanh reviewed  Well controlled without lows  Continue current regimen  F/u in 6 months with labs    - Comp Metabolic Panel; Future  - MICROALBUMIN CREAT RATIO URINE; Future  - TSH WITH REFLEX TO FT4; Future  - Lipid Profile; Future  - " HEMOGLOBIN A1C; Future    2. Dyslipidemia  Ldl 40  No changes      - Comp Metabolic Panel; Future  - MICROALBUMIN CREAT RATIO URINE; Future  - TSH WITH REFLEX TO FT4; Future  - Lipid Profile; Future  - HEMOGLOBIN A1C; Future    3. Coronary artery disease due to calcified coronary lesion  Continue to follow with cardiology    - Comp Metabolic Panel; Future  - MICROALBUMIN CREAT RATIO URINE; Future  - TSH WITH REFLEX TO FT4; Future  - Lipid Profile; Future  - HEMOGLOBIN A1C; Future      Orders Placed This Encounter    Comp Metabolic Panel    MICROALBUMIN CREAT RATIO URINE    TSH WITH REFLEX TO FT4    Lipid Profile    HEMOGLOBIN A1C       No follow-ups on file.    Please note that this dictation was created using voice recognition software. I have made every reasonable attempt to correct obvious errors, but I expect that there are errors of grammar and possibly content that I did not discover before finalizing the note.     Ariel Durant M.D.

## 2025-04-14 NOTE — TELEPHONE ENCOUNTER
Called and spoke with patient, he confirmed he takes 40-80 units per day on degludec. It varies by his meal. He does not have a sliding scale.    Thank you,

## 2025-04-29 ENCOUNTER — TELEPHONE (OUTPATIENT)
Dept: INTERNAL MEDICINE | Facility: OTHER | Age: 75
End: 2025-04-29

## 2025-04-29 ENCOUNTER — OFFICE VISIT (OUTPATIENT)
Dept: INTERNAL MEDICINE | Facility: OTHER | Age: 75
End: 2025-04-29
Payer: MEDICARE

## 2025-04-29 VITALS
WEIGHT: 227.2 LBS | TEMPERATURE: 97.9 F | HEART RATE: 87 BPM | DIASTOLIC BLOOD PRESSURE: 70 MMHG | SYSTOLIC BLOOD PRESSURE: 120 MMHG | HEIGHT: 75 IN | BODY MASS INDEX: 28.25 KG/M2 | OXYGEN SATURATION: 97 %

## 2025-04-29 DIAGNOSIS — E78.5 DYSLIPIDEMIA: ICD-10-CM

## 2025-04-29 DIAGNOSIS — Z01.818 PREOPERATIVE CLEARANCE: ICD-10-CM

## 2025-04-29 DIAGNOSIS — E11.41 TYPE 2 DIABETES MELLITUS WITH DIABETIC MONONEUROPATHY, WITH LONG-TERM CURRENT USE OF INSULIN (HCC): ICD-10-CM

## 2025-04-29 DIAGNOSIS — Z79.4 TYPE 2 DIABETES MELLITUS WITH DIABETIC MONONEUROPATHY, WITH LONG-TERM CURRENT USE OF INSULIN (HCC): ICD-10-CM

## 2025-04-29 DIAGNOSIS — I25.10 CORONARY ARTERY DISEASE DUE TO CALCIFIED CORONARY LESION: ICD-10-CM

## 2025-04-29 DIAGNOSIS — I10 PRIMARY HYPERTENSION: ICD-10-CM

## 2025-04-29 DIAGNOSIS — S83.241A ACUTE MEDIAL MENISCUS TEAR OF RIGHT KNEE, INITIAL ENCOUNTER: ICD-10-CM

## 2025-04-29 DIAGNOSIS — I25.84 CORONARY ARTERY DISEASE DUE TO CALCIFIED CORONARY LESION: ICD-10-CM

## 2025-04-29 PROBLEM — M25.561 ARTHRALGIA OF RIGHT KNEE: Status: ACTIVE | Noted: 2025-04-02

## 2025-04-29 ASSESSMENT — ENCOUNTER SYMPTOMS
NAUSEA: 0
COUGH: 0
SHORTNESS OF BREATH: 0
FEVER: 0
PALPITATIONS: 0

## 2025-04-29 NOTE — TELEPHONE ENCOUNTER
Surgical/Dental Clearance paperwork received from [facility] requiring provider signature.      All appropriate fields completed by Medical Assistant: Yes    Paperwork handed to provider to sign then faxed to [phone]  and [phone]

## 2025-04-29 NOTE — PATIENT INSTRUCTIONS
OPAL RAMACHANDRAN  [address] [place] [address] [address]  [facility] [address]  Phone: [phone]

## 2025-04-29 NOTE — PROGRESS NOTES
History of Present Illness:   Dejan Mooney is a 74 y.o. male who presents to obtain preop clearance for his right medial meniscus repair surgery.  Patient states that while he is in [place] a month ago he got up and felt a pop in his right knee.  It turned out that he had torn his meniscus.  He is scheduled up surgery on May 6 with [facility] orthopedics.  He does not have any exercise intolerance.  Denies any chest pain or shortness of breath.      Past Medical History:   Diagnosis Date    BBB (bundle branch block)     Diabetes     insulin and oral medication    Heart burn     Hyperlipidemia     Hypertension     Indigestion     Pain     knee, shoulder    Pneumonia 1974    Psychiatric problem     depression    Renal calculus 7/2013    kidney stones    Skin rash 3/16/2022    Snoring     sleep apnea questionairre completed       Past Surgical History:   Procedure Laterality Date    RECOVERY  5/13/2014    [facility]    OTHER  2/2014    right knee    KNEE ARTHROSCOPY  8/22/2013    Performed by Maurisio Alfaro M.D. at SURGERY [facility] ORS    MENISCECTOMY, KNEE, MEDIAL  8/22/2013    Performed by Maurisio Alfaro M.D. at SURGERY [facility] ORS    SHOULDER ARTHROTOMY  2003    right    KNEE ARTHROSCOPY  1990    left    KNEE ARTHROSCOPY  1985    right    ORIF, ANKLE  1984    left    ORIF, KNEE  1970    left    TONSILLECTOMY  as child       Family History   Problem Relation Age of Onset    Heart Disease Mother     Hypertension Mother     Heart Disease Father     Hypertension Father     Atrial fibrillation Brother        Social History     Socioeconomic History    Marital status:      Spouse name: Not on file    Number of children: Not on file    Years of education: Not on file    Highest education level: Not on file   Occupational History    Not on file   Tobacco Use    Smoking status: Never    Smokeless tobacco: Never   Vaping  Use    Vaping status: Never Used   Substance and Sexual Activity    Alcohol use: Yes     Alcohol/week: 1.2 oz     Types: 2 Standard drinks or equivalent per week     Comment: 1x/week    Drug use: No    Sexual activity: Not on file   Other Topics Concern    Not on file   Social History Narrative    Not on file     Social Drivers of Health     Financial Resource Strain: Not on file   Food Insecurity: Not on file   Transportation Needs: Not on file   Physical Activity: Not on file   Stress: Not on file   Social Connections: Not on file   Intimate Partner Violence: Not on file   Housing Stability: Not on file       Current Outpatient Medications   Medication Sig Dispense Refill    OZEMPIC, 2 MG/DOSE, 8 MG/3ML Solution Pen-injector Inject 2 mg under the skin every 7 days for 48 doses. 9 mL 3    metFORMIN (GLUCOPHAGE) 500 MG Tab TAKE 1 TABLET BY MOUTH IN THE MORNING AND 2 TABLETS IN THE EVENING 270 Tablet 0    Continuous Glucose Sensor (FREESTYLE HANH 14 DAY SENSOR) Misc Inject 1 Each under the skin every 14 days for 336 days. 6 Each 3    Insulin Pen Needle 32 G x 4 mm (BD PEN NEEDLE ANNETTE U/F) 200 Units every day at 6 PM. 100 Each 2    BD PEN NEEDLE MICRO U/F 32G X 6 MM Misc Inject 1 Each under the skin every day for 360 doses. 90 Each 3    atorvastatin (LIPITOR) 80 MG tablet Take 80 mg by mouth every evening.      gabapentin (NEURONTIN) 800 MG tablet Take 1 Tablet by mouth 3 times a day. 90 Tablet 2    carvedilol (COREG) 25 MG Tab Take 0.5 Tablets by mouth 2 times a day with meals. 60 Tablet 2    Insulin Degludec (TRESIBA FLEXTOUCH) 100 UNIT/ML Solution Pen-injector       ezetimibe (ZETIA) 10 MG Tab Take 1 Tablet by mouth every day. 100 Tablet 3    irbesartan (AVAPRO) 300 MG Tab Take 1 Tablet by mouth every day. 100 Tablet 3    zolpidem (AMBIEN) 10 MG Tab Take 10 mg by mouth at bedtime as needed for Sleep.      ASPIRIN 81 MG PO TABS Take 81 mg by mouth every day.       No current facility-administered medications for  "this visit.       Allergies   Allergen Reactions    Penicillins Anaphylaxis, Hives, Swelling and Shortness of Breath    Norvasc [Amlodipine]      Leg swelling       Review of Systems:  Review of Systems   Constitutional:  Negative for fever.   Respiratory:  Negative for cough and shortness of breath.    Cardiovascular:  Negative for chest pain and palpitations.   Gastrointestinal:  Negative for nausea.        Medications:     Current Outpatient Medications:     Ozempic (2 MG/DOSE), 2 mg, Subcutaneous, Q7 DAYS, Taking    metFORMIN, TAKE 1 TABLET BY MOUTH IN THE MORNING AND 2 TABLETS IN THE EVENING, Taking    FreeStyle Shabbir 14 Day Sensor, 1 Each, Subcutaneous, Q14 DAYS, Taking    BD Pen Needle Grace U/F, 200 Units, Does not apply, DAILY AT 1800, Taking    BD Pen Needle Micro U/F, 1 Each, Subcutaneous, DAILY, Taking    atorvastatin, 80 mg, Oral, Q EVENING, Taking    gabapentin, 800 mg, Oral, TID, Taking    carvedilol, 12.5 mg, Oral, BID WITH MEALS, Taking    Tresiba FlexTouch, , Taking    ezetimibe, 10 mg, Oral, DAILY, Taking    irbesartan, 300 mg, Oral, DAILY, Taking    zolpidem, 10 mg, Oral, HS PRN, PRN    aspirin, 81 mg, Oral, DAILY, Taking     Objective:  Vitals:   /70 (BP Location: Left arm, Patient Position: Sitting, BP Cuff Size: Adult)   Pulse 87   Temp 36.6 °C (97.9 °F) (Temporal)   Ht 1.892 m (6' 2.5\")   Wt 103 kg (227 lb 3.2 oz)   SpO2 97%  Body mass index is 28.78 kg/m².    Physical Exam  Constitutional:       General: He is not in acute distress.  HENT:      Head: Normocephalic and atraumatic.   Eyes:      Conjunctiva/sclera: Conjunctivae normal.   Cardiovascular:      Rate and Rhythm: Normal rate and regular rhythm.      Heart sounds: Normal heart sounds.   Pulmonary:      Breath sounds: Normal breath sounds. No wheezing or rales.   Neurological:      General: No focal deficit present.      Mental Status: He is oriented to person, place, and time.          Results:    Lab Results   Component " Value Date/Time    CHOLSTRLTOT 96 (L) 04/01/2025 06:41 AM    CHOLSTRLTOT 144 06/17/2013 07:12 AM    LDL 71 06/17/2013 07:12 AM    HDL 31 (L) 04/01/2025 06:41 AM    HDL 31 (A) 06/17/2013 07:12 AM    TRIGLYCERIDE 146 04/01/2025 06:41 AM    TRIGLYCERIDE 211 (H) 06/17/2013 07:12 AM       Lab Results   Component Value Date/Time    SODIUM 143 04/01/2025 06:41 AM    SODIUM 135 07/07/2020 05:40 PM    POTASSIUM 4.7 04/01/2025 06:41 AM    POTASSIUM 4.0 07/07/2020 05:40 PM    CHLORIDE 101 04/01/2025 06:41 AM    CHLORIDE 97 07/07/2020 05:40 PM    CO2 26 04/01/2025 06:41 AM    CO2 21 07/07/2020 05:40 PM    GLUCOSE 92 04/01/2025 06:41 AM    GLUCOSE 181 (H) 07/07/2020 05:40 PM    BUN 31 (H) 04/01/2025 06:41 AM    BUN 29 (H) 07/07/2020 05:40 PM    CREATININE 1.07 04/01/2025 06:41 AM    CREATININE 1.21 07/07/2020 05:40 PM    BUNCREATRAT 29 (H) 04/01/2025 06:41 AM     Lab Results   Component Value Date/Time    ALKPHOSPHAT 98 04/01/2025 06:41 AM    ALKPHOSPHAT 76 07/07/2020 05:40 PM    ASTSGOT 21 04/01/2025 06:41 AM    ASTSGOT 15 07/07/2020 05:40 PM    ALTSGPT 20 04/01/2025 06:41 AM    ALTSGPT 17 07/07/2020 05:40 PM    TBILIRUBIN 0.6 04/01/2025 06:41 AM    TBILIRUBIN 1.0 07/07/2020 05:40 PM        Lab Results   Component Value Date/Time    WBC 6.0 06/17/2024 08:05 AM    WBC 13.1 (H) 07/07/2020 05:40 PM    RBC 4.16 06/17/2024 08:05 AM    RBC 4.89 07/07/2020 05:40 PM    HEMOGLOBIN 12.6 (L) 06/17/2024 08:05 AM    HEMOGLOBIN 14.6 07/07/2020 05:40 PM    HEMATOCRIT 38.2 06/17/2024 08:05 AM    HEMATOCRIT 43.8 07/07/2020 05:40 PM    MCV 92 06/17/2024 08:05 AM    MCV 89.6 07/07/2020 05:40 PM    MCH 30.3 06/17/2024 08:05 AM    MCH 29.9 07/07/2020 05:40 PM    MCHC 33.0 06/17/2024 08:05 AM    MCHC 33.3 (L) 07/07/2020 05:40 PM    MPV 9.2 07/07/2020 05:40 PM    NEUTSPOLYS 88.40 (H) 07/07/2020 05:40 PM    LYMPHOCYTES 4.10 (L) 07/07/2020 05:40 PM    MONOCYTES 6.40 07/07/2020 05:40 PM    EOSINOPHILS 0.00 07/07/2020 05:40 PM    BASOPHILS 0.30  07/07/2020 05:40 PM    HYPOCHROMIA 1+ 06/17/2013 07:12 AM        Assessment and Plan:    #Right medial meniscus tear  #Preop clearance  Scheduled to have surgery on 5/6  RCRI score of 0, 3.9% risk of major cardiac event  METs greater than 4  ASA ~3 (well-controlled diabetes, hypertension, coronary artery disease without stents)  Given patient's medical history and functional status, he is low risk for meniscus repair surgery  -Recommended him hold semaglutide 1 week prior to surgery  -Will fill out preop paperwork once it is faxed over    #T2DM well-controlled  #Neuropathy  #Microalbuminemia  Following with endocrinology  Recent A1c (4/2025): 6.4%  Monofilament test (11/2024): No sensation felt in all points  No open wounds   [facility] screening  Microalbumin/creatinine ratio (1/2025): 67, on irbesartan  Vitamin B12 (1/2025): Within normal limits while on metformin  Current regimen: Jardiance, degludec, semaglutide, metformin    #CAD  #Dyslipidemia  Following with cardiology, LDL goal < 55  LDL (4/2025): 40 at goal  On aspirin, atorvastatin, Zetia    #Hypertension  Following with cardiology   Well-controlled on irbesartan and carvedilol    #Dizziness  Patient with dizziness described as balance problems started around September 2024 that been worsening. When he will walk from the store to his car he feels like he will be tilting left and right will have to hold onto his wife for balance.  He has associated lightheadedness while getting up as well as severe headaches that are occurring more frequently. No syncopal episodes, falls, vision changes, or hearing changes  He does have history of BPPV but notes that this feels different.  It is not a vertigo like sensation, thus less likely inner ear problem, like Ménière's.  He also does not have any hearing change and saw an audiologist who told him that his hearing was perfect  Possibly worsening of his peripheral  neuropathy  Vitamin B12 within normal limits.  RPR negative  No gait abnormalities noted when testing in office  Orthostatics negative  His balance issues have been getting slightly better with PT but are not 100% improved.  There was suspicion that it might be due to hypoperfusion and he was lowered on his antihypertensives which he feels have not been helping relieve his symptoms as much  MRI without acute intracranial abnormalities, showed chronic small vessel disease  Symptoms have remained unchanged since last visit  -Has follow-up with ENT and neurology     #Insomnia  Following pain management on Ambien       Follow Up:  Return in about 3 months (around 7/29/2025).

## 2025-05-12 ENCOUNTER — APPOINTMENT (OUTPATIENT)
Dept: URBAN - METROPOLITAN AREA CLINIC 35 | Facility: CLINIC | Age: 75
Setting detail: DERMATOLOGY
End: 2025-05-12

## 2025-05-12 DIAGNOSIS — L72.11 PILAR CYST: ICD-10-CM

## 2025-05-12 DIAGNOSIS — L82.1 OTHER SEBORRHEIC KERATOSIS: ICD-10-CM

## 2025-05-12 DIAGNOSIS — L57.0 ACTINIC KERATOSIS: ICD-10-CM

## 2025-05-12 DIAGNOSIS — Z71.89 OTHER SPECIFIED COUNSELING: ICD-10-CM

## 2025-05-12 DIAGNOSIS — L81.4 OTHER MELANIN HYPERPIGMENTATION: ICD-10-CM

## 2025-05-12 DIAGNOSIS — L82.0 INFLAMED SEBORRHEIC KERATOSIS: ICD-10-CM

## 2025-05-12 DIAGNOSIS — D22 MELANOCYTIC NEVI: ICD-10-CM

## 2025-05-12 PROBLEM — D22.61 MELANOCYTIC NEVI OF RIGHT UPPER LIMB, INCLUDING SHOULDER: Status: ACTIVE | Noted: 2025-05-12

## 2025-05-12 PROCEDURE — 99213 OFFICE O/P EST LOW 20 MIN: CPT | Mod: 25

## 2025-05-12 PROCEDURE — 17110 DESTRUCTION B9 LES UP TO 14: CPT

## 2025-05-12 PROCEDURE — ? LIQUID NITROGEN

## 2025-05-12 PROCEDURE — ? OBSERVATION

## 2025-05-12 PROCEDURE — 17000 DESTRUCT PREMALG LESION: CPT | Mod: 59

## 2025-05-12 PROCEDURE — ? COUNSELING

## 2025-05-12 PROCEDURE — 17003 DESTRUCT PREMALG LES 2-14: CPT | Mod: 59

## 2025-05-12 ASSESSMENT — LOCATION SIMPLE DESCRIPTION DERM
LOCATION SIMPLE: RIGHT CHEEK
LOCATION SIMPLE: RIGHT UPPER BACK
LOCATION SIMPLE: SCALP
LOCATION SIMPLE: LOWER BACK
LOCATION SIMPLE: POSTERIOR NECK
LOCATION SIMPLE: RIGHT SHOULDER
LOCATION SIMPLE: RIGHT ZYGOMA
LOCATION SIMPLE: RIGHT CALF
LOCATION SIMPLE: LEFT HAND
LOCATION SIMPLE: LEFT FOREHEAD
LOCATION SIMPLE: RIGHT HAND

## 2025-05-12 ASSESSMENT — LOCATION DETAILED DESCRIPTION DERM
LOCATION DETAILED: RIGHT SUPERIOR PARIETAL SCALP
LOCATION DETAILED: SUPERIOR LUMBAR SPINE
LOCATION DETAILED: LEFT SUPERIOR LATERAL FOREHEAD
LOCATION DETAILED: LEFT RADIAL DORSAL HAND
LOCATION DETAILED: RIGHT SUPERIOR MEDIAL MALAR CHEEK
LOCATION DETAILED: RIGHT POSTERIOR SHOULDER
LOCATION DETAILED: RIGHT RADIAL DORSAL HAND
LOCATION DETAILED: RIGHT LATERAL TRAPEZIAL NECK
LOCATION DETAILED: RIGHT SUPERIOR UPPER BACK
LOCATION DETAILED: RIGHT MEDIAL ZYGOMA
LOCATION DETAILED: RIGHT PROXIMAL CALF
LOCATION DETAILED: LEFT FOREHEAD

## 2025-05-12 ASSESSMENT — LOCATION ZONE DERM
LOCATION ZONE: NECK
LOCATION ZONE: ARM
LOCATION ZONE: FACE
LOCATION ZONE: HAND
LOCATION ZONE: LEG
LOCATION ZONE: SCALP
LOCATION ZONE: TRUNK

## 2025-05-13 ENCOUNTER — TELEPHONE (OUTPATIENT)
Dept: ENDOCRINOLOGY | Facility: MEDICAL CENTER | Age: 75
End: 2025-05-13
Payer: MEDICARE

## 2025-05-13 DIAGNOSIS — Z79.4 TYPE 2 DIABETES MELLITUS WITH DIABETIC MONONEUROPATHY, WITH LONG-TERM CURRENT USE OF INSULIN (HCC): ICD-10-CM

## 2025-05-13 DIAGNOSIS — Z79.4 TYPE 2 DIABETES MELLITUS WITH DIABETIC MONONEUROPATHY, WITH LONG-TERM CURRENT USE OF INSULIN (HCC): Primary | ICD-10-CM

## 2025-05-13 DIAGNOSIS — E11.41 TYPE 2 DIABETES MELLITUS WITH DIABETIC MONONEUROPATHY, WITH LONG-TERM CURRENT USE OF INSULIN (HCC): ICD-10-CM

## 2025-05-13 DIAGNOSIS — E11.41 TYPE 2 DIABETES MELLITUS WITH DIABETIC MONONEUROPATHY, WITH LONG-TERM CURRENT USE OF INSULIN (HCC): Primary | ICD-10-CM

## 2025-05-13 RX ORDER — HYDROCHLOROTHIAZIDE 12.5 MG/1
1 CAPSULE ORAL
Qty: 6 EACH | Refills: 3 | Status: SHIPPED | OUTPATIENT
Start: 2025-05-13 | End: 2025-05-15

## 2025-05-13 NOTE — TELEPHONE ENCOUNTER
Received request via: Pharmacy    Was the patient seen in the last year in this department? Yes    Does the patient have an active prescription (recently filled or refills available) for medication(s) requested? No    Pharmacy Name: [id number] - [place], [place] - [address] AT [facility]     Does the patient have [facility] and need 100-day supply? (This applies to ALL medications) Patient does not have SCP

## 2025-05-13 NOTE — TELEPHONE ENCOUNTER
Medication Requested: Shabbir 3 plus       Insulin pen Or vial? : n/a        Days Supply: 30 day         Pharmacy:   [id number] - [place] - [address] [address]           Number of Refills: 3

## 2025-05-15 RX ORDER — HYDROCHLOROTHIAZIDE 12.5 MG/1
1 CAPSULE ORAL
Qty: 12 EACH | Refills: 2 | Status: SHIPPED | OUTPATIENT
Start: 2025-05-15 | End: 2026-09-18

## 2025-06-05 ENCOUNTER — OFFICE VISIT (OUTPATIENT)
Dept: CARDIOLOGY | Facility: MEDICAL CENTER | Age: 75
End: 2025-06-05
Attending: INTERNAL MEDICINE
Payer: MEDICARE

## 2025-06-05 VITALS
RESPIRATION RATE: 18 BRPM | DIASTOLIC BLOOD PRESSURE: 62 MMHG | BODY MASS INDEX: 28.78 KG/M2 | SYSTOLIC BLOOD PRESSURE: 110 MMHG | HEIGHT: 75 IN | HEART RATE: 91 BPM | OXYGEN SATURATION: 95 %

## 2025-06-05 DIAGNOSIS — R93.1 ELEVATED CORONARY ARTERY CALCIUM SCORE: ICD-10-CM

## 2025-06-05 DIAGNOSIS — E11.41 TYPE 2 DIABETES MELLITUS WITH DIABETIC MONONEUROPATHY, WITH LONG-TERM CURRENT USE OF INSULIN (HCC): ICD-10-CM

## 2025-06-05 DIAGNOSIS — E78.5 DYSLIPIDEMIA: ICD-10-CM

## 2025-06-05 DIAGNOSIS — Z79.4 TYPE 2 DIABETES MELLITUS WITH DIABETIC MONONEUROPATHY, WITH LONG-TERM CURRENT USE OF INSULIN (HCC): ICD-10-CM

## 2025-06-05 DIAGNOSIS — I10 PRIMARY HYPERTENSION: ICD-10-CM

## 2025-06-05 PROCEDURE — 99214 OFFICE O/P EST MOD 30 MIN: CPT | Performed by: INTERNAL MEDICINE

## 2025-06-05 PROCEDURE — 3074F SYST BP LT 130 MM HG: CPT | Performed by: INTERNAL MEDICINE

## 2025-06-05 PROCEDURE — 3078F DIAST BP <80 MM HG: CPT | Performed by: INTERNAL MEDICINE

## 2025-06-05 PROCEDURE — 99213 OFFICE O/P EST LOW 20 MIN: CPT | Performed by: INTERNAL MEDICINE

## 2025-06-05 PROCEDURE — 99212 OFFICE O/P EST SF 10 MIN: CPT | Performed by: INTERNAL MEDICINE

## 2025-06-05 RX ORDER — CARVEDILOL 25 MG/1
12.5 TABLET ORAL 2 TIMES DAILY WITH MEALS
Qty: 180 TABLET | Refills: 3 | Status: SHIPPED | OUTPATIENT
Start: 2025-06-05

## 2025-06-05 RX ORDER — EZETIMIBE 10 MG/1
10 TABLET ORAL DAILY
Qty: 100 TABLET | Refills: 3 | Status: SHIPPED | OUTPATIENT
Start: 2025-06-05

## 2025-06-05 RX ORDER — ATORVASTATIN CALCIUM 80 MG/1
80 TABLET, FILM COATED ORAL EVERY EVENING
Qty: 100 TABLET | Refills: 3 | Status: SHIPPED | OUTPATIENT
Start: 2025-06-05

## 2025-06-05 RX ORDER — IRBESARTAN 300 MG/1
300 TABLET ORAL DAILY
Qty: 100 TABLET | Refills: 3 | Status: SHIPPED | OUTPATIENT
Start: 2025-06-05

## 2025-06-05 NOTE — ASSESSMENT & PLAN NOTE
Reviewing most recent lipid panel demonstrates well-controlled LDL and overall total cholesterol.  No changes made to his medical therapy.  Refills and sent to the pharmacy

## 2025-06-05 NOTE — PROGRESS NOTES
[facility]    PatientName:Dejan MooneyDate: 2025  :1950    74 y.o.PCP:Ariel Santamaria D.O.  MRN:[id number]        Problems and Plans    Dyslipidemia  Reviewing most recent lipid panel demonstrates well-controlled LDL and overall total cholesterol.  No changes made to his medical therapy.  Refills and sent to the pharmacy    Elevated coronary artery calcium score  Recommend continuation of ongoing Lipitor therapy as well as ongoing Zetia therapy as well as integration of a healthy lifestyle consisting of largely Mediterranean diet as a recommended diet and integration of ongoing exercise.    Primary hypertension  Blood pressures currently well-controlled on his current regimen no changes were made to his medical therapy.    Return in about 1 year (around 2026).      Encounter    Reason for Visit / Chief Complaint: Obstructive coronary artery disease    HPI    74-year-old male with known history of hypertension, insulin-dependent diabetes, nonobstructive coronary artery disease, chronic right bundle branch block benign positional peripheral vertigo presents to clinic for ongoing management of his nonobstructive coronary artery disease and hypertension     Currently, notes he is feeling well not having significant cardiovascular complaints.  Will do conservative activities however he is still having some gait instability larger that he attributes to his bilateral knee discomfort.  He did have a traumatic injury to the right knee that required arthroscopic surgical intervention for meniscal tear.  This was performed back in May 2025  Past Medical History    Past Medical History[1]  Past Surgical History  Past Surgical History[2]  Social History  Social History     Socioeconomic History    Marital status:      Spouse name: Not on file    Number of children: Not on file    Years of education: Not on file    Highest education level: Not on file    Occupational History    Not on file   Tobacco Use    Smoking status: Never    Smokeless tobacco: Never   Vaping Use    Vaping status: Never Used   Substance and Sexual Activity    Alcohol use: Yes     Alcohol/week: 1.2 oz     Types: 2 Standard drinks or equivalent per week     Comment: 1x/week    Drug use: No    Sexual activity: Not on file   Other Topics Concern    Not on file   Social History Narrative    Not on file     Social Drivers of Health     Financial Resource Strain: Not on file   Food Insecurity: Not on file   Transportation Needs: Not on file   Physical Activity: Not on file   Stress: Not on file   Social Connections: Not on file   Intimate Partner Violence: Not on file   Housing Stability: Not on file     Past Family History  Family History   Problem Relation Age of Onset    Heart Disease Mother     Hypertension Mother     Heart Disease Father     Hypertension Father     Atrial fibrillation Brother      Medication(s)    Current Outpatient Medications:     irbesartan, 300 mg, Oral, DAILY, Taking    ezetimibe, 10 mg, Oral, DAILY, Taking    carvedilol, 12.5 mg, Oral, BID WITH MEALS, Taking    atorvastatin, 80 mg, Oral, Q EVENING, Taking    FreeStyle Shabbir 3 Plus Sensor, 1 Each, Does not apply, Q14 DAYS, Taking    metFORMIN, TAKE 1 TABLET BY MOUTH IN THE MORNING AND 2 TABLETS IN THE EVENING, Taking    Ozempic (2 MG/DOSE), 2 mg, Subcutaneous, Q7 DAYS, Taking    BD Pen Needle Grace U/F, 200 Units, Does not apply, DAILY AT 1800, Taking    BD Pen Needle Micro U/F, 1 Each, Subcutaneous, DAILY, Taking    gabapentin, 800 mg, Oral, TID, Taking    Tresiba FlexTouch, , Taking    zolpidem, 10 mg, Oral, HS PRN, Taking As Needed    aspirin, 81 mg, Oral, DAILY, Taking  Allergies  Penicillins and Norvasc [amlodipine]    Review of Systems    A comprehensive 10 system review was conducted and is negative except as noted above in the HPI or here.      Vital Signs  /62 (BP Location: Left arm, Patient Position:  "Sitting, BP Cuff Size: Adult)   Pulse 91   Resp 18   Ht 1.892 m (6' 2.5\")   SpO2 95%   BMI 28.78 kg/m²     Physical Exam  Constitutional:       Appearance: Normal appearance. He is obese.   HENT:      Head: Normocephalic and atraumatic.      Mouth/Throat:      Mouth: Mucous membranes are moist.      Pharynx: Oropharynx is clear.   Eyes:      Extraocular Movements: Extraocular movements intact.      Conjunctiva/sclera: Conjunctivae normal.   Cardiovascular:      Rate and Rhythm: Normal rate and regular rhythm.      Pulses: Normal pulses.      Heart sounds: Normal heart sounds. No murmur heard.     No friction rub. No gallop.   Pulmonary:      Effort: Pulmonary effort is normal.      Breath sounds: Normal breath sounds.   Abdominal:      General: Bowel sounds are normal.      Palpations: Abdomen is soft.   Musculoskeletal:         General: Normal range of motion.      Cervical back: Normal range of motion and neck supple.      Comments: Right knee with well-healed arthroscopic incisional scars   Skin:     General: Skin is warm and dry.   Neurological:      General: No focal deficit present.      Mental Status: He is alert and oriented to person, place, and time. Mental status is at baseline.   Psychiatric:         Mood and Affect: Mood normal.         Behavior: Behavior normal.         Thought Content: Thought content normal.         Judgment: Judgment normal.         Lab Results   Component Value Date/Time    TSHULTRASEN 1.190 03/11/2013 0821        Lab Results   Component Value Date/Time    FREET4 1.05 03/11/2013 0821        Lab Results   Component Value Date/Time    HBA1C 6.4 (H) 04/01/2025 06:41 AM    HBA1C 6.3 (A) 11/04/2024 12:44 AM       Lab Results   Component Value Date/Time    CHOLSTRLTOT 96 (L) 04/01/2025 06:41 AM    CHOLSTRLTOT 144 06/17/2013 07:12 AM    LDL 71 06/17/2013 07:12 AM    HDL 31 (L) 04/01/2025 06:41 AM    HDL 31 (A) 06/17/2013 07:12 AM    TRIGLYCERIDE 146 04/01/2025 06:41 AM    " TRIGLYCERIDE 211 (H) 06/17/2013 07:12 AM         Lab Results   Component Value Date/Time    SODIUM 143 04/01/2025 06:41 AM    SODIUM 135 07/07/2020 05:40 PM    POTASSIUM 4.7 04/01/2025 06:41 AM    POTASSIUM 4.0 07/07/2020 05:40 PM    CHLORIDE 101 04/01/2025 06:41 AM    CHLORIDE 97 07/07/2020 05:40 PM    CO2 26 04/01/2025 06:41 AM    CO2 21 07/07/2020 05:40 PM    GLUCOSE 92 04/01/2025 06:41 AM    GLUCOSE 181 (H) 07/07/2020 05:40 PM    BUN 31 (H) 04/01/2025 06:41 AM    BUN 29 (H) 07/07/2020 05:40 PM    CREATININE 1.07 04/01/2025 06:41 AM    CREATININE 1.21 07/07/2020 05:40 PM    BUNCREATRAT 29 (H) 04/01/2025 06:41 AM       Lab Results   Component Value Date/Time    ALKPHOSPHAT 98 04/01/2025 06:41 AM    ALKPHOSPHAT 76 07/07/2020 05:40 PM    ASTSGOT 21 04/01/2025 06:41 AM    ASTSGOT 15 07/07/2020 05:40 PM    ALTSGPT 20 04/01/2025 06:41 AM    ALTSGPT 17 07/07/2020 05:40 PM    TBILIRUBIN 0.6 04/01/2025 06:41 AM    TBILIRUBIN 1.0 07/07/2020 05:40 PM         Total patient time was estimated to be 20 minutes consisting of chart review, direct patient interaction, medication renewal, plan development and overall communication with the cardiovascular team.        Electronically signed by:   Vamshi Delgado DO, MPH  [facility]    Portions of this note were completed using voice recognition software (Dragon Naturally speaking software) . Occasional transcription errors may have escaped proof reading. I have made every reasonable attempt to correct obvious errors, but I expect that there are errors of grammar and possibly content that I did not discover before finalizing the note.           [1]   Past Medical History:  Diagnosis Date    BBB (bundle branch block)     Diabetes     insulin and oral medication    Heart burn     Hyperlipidemia     Hypertension     Indigestion     Pain     knee, shoulder    Pneumonia 1974    Psychiatric problem     depression    Renal calculus 7/2013    kidney stones     Skin rash 3/16/2022    Snoring     sleep apnea questionairre completed   [2]   Past Surgical History:  Procedure Laterality Date    RECOVERY  5/13/2014    Performed by [facility] at [facility]GERY [facility]    OTHER  2/2014    right knee    KNEE ARTHROSCOPY  8/22/2013    Performed by Maurisio Alfaro M.D. at SURGERY [facility]    MENISCECTOMY, KNEE, MEDIAL  8/22/2013    Performed by Maurisio Alfaro M.D. at SURGERY [facility]    SHOULDER ARTHROTOMY  2003    right    KNEE ARTHROSCOPY  1990    left    KNEE ARTHROSCOPY  1985    right    ORIF, ANKLE  1984    left    ORIF, KNEE  1970    left    TONSILLECTOMY  as child

## 2025-06-05 NOTE — ASSESSMENT & PLAN NOTE
Blood pressures currently well-controlled on his current regimen no changes were made to his medical therapy.

## 2025-06-10 ENCOUNTER — TELEPHONE (OUTPATIENT)
Dept: INTERNAL MEDICINE | Facility: OTHER | Age: 75
End: 2025-06-10

## 2025-06-10 ENCOUNTER — OFFICE VISIT (OUTPATIENT)
Dept: INTERNAL MEDICINE | Facility: OTHER | Age: 75
End: 2025-06-10
Payer: MEDICARE

## 2025-06-10 VITALS
DIASTOLIC BLOOD PRESSURE: 62 MMHG | BODY MASS INDEX: 29.13 KG/M2 | WEIGHT: 227 LBS | HEART RATE: 94 BPM | HEIGHT: 74 IN | SYSTOLIC BLOOD PRESSURE: 118 MMHG | TEMPERATURE: 97.4 F | OXYGEN SATURATION: 95 %

## 2025-06-10 DIAGNOSIS — R26.89 BALANCE PROBLEMS: ICD-10-CM

## 2025-06-10 DIAGNOSIS — Z79.4 TYPE 2 DIABETES MELLITUS WITH DIABETIC MONONEUROPATHY, WITH LONG-TERM CURRENT USE OF INSULIN (HCC): Primary | ICD-10-CM

## 2025-06-10 DIAGNOSIS — R31.9 HEMATURIA, UNSPECIFIED TYPE: ICD-10-CM

## 2025-06-10 DIAGNOSIS — I10 PRIMARY HYPERTENSION: ICD-10-CM

## 2025-06-10 DIAGNOSIS — R93.1 ELEVATED CORONARY ARTERY CALCIUM SCORE: ICD-10-CM

## 2025-06-10 DIAGNOSIS — E11.41 TYPE 2 DIABETES MELLITUS WITH DIABETIC MONONEUROPATHY, WITH LONG-TERM CURRENT USE OF INSULIN (HCC): Primary | ICD-10-CM

## 2025-06-10 DIAGNOSIS — D64.9 ANEMIA, UNSPECIFIED TYPE: ICD-10-CM

## 2025-06-10 PROCEDURE — 3074F SYST BP LT 130 MM HG: CPT

## 2025-06-10 PROCEDURE — 3078F DIAST BP <80 MM HG: CPT

## 2025-06-10 PROCEDURE — 99213 OFFICE O/P EST LOW 20 MIN: CPT | Mod: GE

## 2025-06-10 ASSESSMENT — ENCOUNTER SYMPTOMS
FEVER: 0
DIZZINESS: 1
SHORTNESS OF BREATH: 0
NAUSEA: 0
VOMITING: 0

## 2025-06-10 NOTE — TELEPHONE ENCOUNTER
Contacted Neurology today and informed them that patient requested the referral be sent. I left a voicemail and faxed the referral to [phone].

## 2025-06-10 NOTE — PROGRESS NOTES
History of Present Illness:   Dejan Mooney is a 74 y.o. male who presents with routine follow-up.  Patient states that overall he is doing well.  He denies any acute complaints.  He is in the process of obtaining neurology appointment and needs referral faxed over again.      Past Medical History[1]    Past Surgical History[2]    Family History   Problem Relation Age of Onset    Heart Disease Mother     Hypertension Mother     Heart Disease Father     Hypertension Father     Atrial fibrillation Brother        Social History     Socioeconomic History    Marital status:      Spouse name: Not on file    Number of children: Not on file    Years of education: Not on file    Highest education level: Not on file   Occupational History    Not on file   Tobacco Use    Smoking status: Never    Smokeless tobacco: Never   Vaping Use    Vaping status: Never Used   Substance and Sexual Activity    Alcohol use: Yes     Alcohol/week: 1.2 oz     Types: 2 Standard drinks or equivalent per week     Comment: 1x/week    Drug use: No    Sexual activity: Not on file   Other Topics Concern    Not on file   Social History Narrative    Not on file     Social Drivers of Health     Financial Resource Strain: Not on file   Food Insecurity: Not on file   Transportation Needs: Not on file   Physical Activity: Not on file   Stress: Not on file   Social Connections: Not on file   Intimate Partner Violence: Not on file   Housing Stability: Not on file       Current Medications[3]    Allergies[4]    Review of Systems:  Review of Systems   Constitutional:  Negative for fever.   Respiratory:  Negative for shortness of breath.    Cardiovascular:  Negative for chest pain.   Gastrointestinal:  Negative for nausea and vomiting.   Neurological:  Positive for dizziness.        Medications:     Current Outpatient Medications:     irbesartan, 300 mg, Oral, DAILY, Taking    ezetimibe, 10 mg, Oral, DAILY, Taking    carvedilol, 12.5  "mg, Oral, BID WITH MEALS, Taking    atorvastatin, 80 mg, Oral, Q EVENING, Taking    FreeStyle Shabbir 3 Plus Sensor, 1 Each, Does not apply, Q14 DAYS, Taking    metFORMIN, TAKE 1 TABLET BY MOUTH IN THE MORNING AND 2 TABLETS IN THE EVENING, Taking    Ozempic (2 MG/DOSE), 2 mg, Subcutaneous, Q7 DAYS, Taking    BD Pen Needle Grace U/F, 200 Units, Does not apply, DAILY AT 1800, Taking    BD Pen Needle Micro U/F, 1 Each, Subcutaneous, DAILY, Taking    gabapentin, 800 mg, Oral, TID, Taking    Tresiba FlexTouch, , Taking    zolpidem, 10 mg, Oral, HS PRN, Taking As Needed    aspirin, 81 mg, Oral, DAILY, Taking     Objective:  Vitals:   /62 (BP Location: Left arm, Patient Position: Sitting, BP Cuff Size: Adult)   Pulse 94   Temp 36.3 °C (97.4 °F) (Temporal)   Ht 1.88 m (6' 2\")   Wt 103 kg (227 lb)   SpO2 95%  Body mass index is 29.15 kg/m².    Physical Exam  Constitutional:       General: He is not in acute distress.  HENT:      Head: Normocephalic and atraumatic.   Eyes:      Conjunctiva/sclera: Conjunctivae normal.   Cardiovascular:      Rate and Rhythm: Normal rate and regular rhythm.   Pulmonary:      Effort: No respiratory distress.      Breath sounds: Normal breath sounds.   Abdominal:      General: There is no distension.   Neurological:      General: No focal deficit present.      Mental Status: He is oriented to person, place, and time.          Results:    Lab Results   Component Value Date/Time    CHOLSTRLTOT 96 (L) 04/01/2025 06:41 AM    CHOLSTRLTOT 144 06/17/2013 07:12 AM    LDL 71 06/17/2013 07:12 AM    HDL 31 (L) 04/01/2025 06:41 AM    HDL 31 (A) 06/17/2013 07:12 AM    TRIGLYCERIDE 146 04/01/2025 06:41 AM    TRIGLYCERIDE 211 (H) 06/17/2013 07:12 AM       Lab Results   Component Value Date/Time    SODIUM 143 04/01/2025 06:41 AM    SODIUM 135 07/07/2020 05:40 PM    POTASSIUM 4.7 04/01/2025 06:41 AM    POTASSIUM 4.0 07/07/2020 05:40 PM    CHLORIDE 101 04/01/2025 06:41 AM    CHLORIDE 97 07/07/2020 05:40 PM "    CO2 26 04/01/2025 06:41 AM    CO2 21 07/07/2020 05:40 PM    GLUCOSE 92 04/01/2025 06:41 AM    GLUCOSE 181 (H) 07/07/2020 05:40 PM    BUN 31 (H) 04/01/2025 06:41 AM    BUN 29 (H) 07/07/2020 05:40 PM    CREATININE 1.07 04/01/2025 06:41 AM    CREATININE 1.21 07/07/2020 05:40 PM    BUNCREATRAT 29 (H) 04/01/2025 06:41 AM     Lab Results   Component Value Date/Time    ALKPHOSPHAT 98 04/01/2025 06:41 AM    ALKPHOSPHAT 76 07/07/2020 05:40 PM    ASTSGOT 21 04/01/2025 06:41 AM    ASTSGOT 15 07/07/2020 05:40 PM    ALTSGPT 20 04/01/2025 06:41 AM    ALTSGPT 17 07/07/2020 05:40 PM    TBILIRUBIN 0.6 04/01/2025 06:41 AM    TBILIRUBIN 1.0 07/07/2020 05:40 PM        Lab Results   Component Value Date/Time    WBC 6.0 06/17/2024 08:05 AM    WBC 13.1 (H) 07/07/2020 05:40 PM    RBC 4.16 06/17/2024 08:05 AM    RBC 4.89 07/07/2020 05:40 PM    HEMOGLOBIN 12.6 (L) 06/17/2024 08:05 AM    HEMOGLOBIN 14.6 07/07/2020 05:40 PM    HEMATOCRIT 38.2 06/17/2024 08:05 AM    HEMATOCRIT 43.8 07/07/2020 05:40 PM    MCV 92 06/17/2024 08:05 AM    MCV 89.6 07/07/2020 05:40 PM    MCH 30.3 06/17/2024 08:05 AM    MCH 29.9 07/07/2020 05:40 PM    MCHC 33.0 06/17/2024 08:05 AM    MCHC 33.3 (L) 07/07/2020 05:40 PM    MPV 9.2 07/07/2020 05:40 PM    NEUTSPOLYS 88.40 (H) 07/07/2020 05:40 PM    LYMPHOCYTES 4.10 (L) 07/07/2020 05:40 PM    MONOCYTES 6.40 07/07/2020 05:40 PM    EOSINOPHILS 0.00 07/07/2020 05:40 PM    BASOPHILS 0.30 07/07/2020 05:40 PM    HYPOCHROMIA 1+ 06/17/2013 07:12 AM        Assessment and Plan:    #normocytic anemia  Hgb (6/2024): 12.6   Iron studies were obtained at that time and does not appear he is iron deficient; % sat 28, ferritin 317  Does not have CKD or known chronic inflammatory diseases   No active bleeding signs/sx  -Repeat CBC, if still low will pursue further workup    #microscopic hematuria  U/A in 2020 with RBCs   Pt does report kidney stone hx  -Repeat U/A to monitor/re-eval    #Right medial meniscus tear s/p repair  Underwent  surgery on 5/6  Currently undergoing PT     #T2DM well-controlled  #Neuropathy  #Microalbuminemia  Following with endocrinology  Recent A1c (4/2025): 6.4%  Monofilament test (11/2024): No sensation felt in all points  No open wounds   Following with [facility] for retinal screening  Microalbumin/creatinine ratio (1/2025): 67, on irbesartan and jardiance  Vitamin B12 (1/2025): Within normal limits while on metformin  Current regimen: Jardiance, degludec, semaglutide, metformin     #CAD  #Dyslipidemia  Following with cardiology, LDL goal < 55  LDL (4/2025): 40 at goal  On aspirin, atorvastatin, Zetia     #Hypertension  Following with cardiology   Well-controlled on irbesartan and carvedilol     #Dizziness  Patient with dizziness described as balance problems started around September 2024. When he will walk from the store to his car he feels like he will be tilting left and right will have to hold onto his wife for balance.  He has associated lightheadedness while getting up as well as severe headaches that are occurring more frequently. No syncopal episodes, falls, vision changes, or hearing changes  He does have history of BPPV but notes that this feels different.  It is not a vertigo like sensation, thus less likely inner ear problem, like Ménière's.  He also does not have any hearing change and saw an audiologist who told him that his hearing was perfect  Possibly worsening of his peripheral neuropathy  Vitamin B12 within normal limits.  RPR negative  No gait abnormalities noted when testing in office  Orthostatics negative  His balance issues have been getting slightly better with PT but are not 100% improved.  There was suspicion that it might be due to hypoperfusion and he was lowered on his antihypertensives which he feels have not been helping relieve his symptoms as much.  MRI on 2/2025 without acute intracranial abnormalities, showed chronic small vessel disease  Symptoms stable since onset, not  worsening  -In the process of scheduling with neurology     #Insomnia  Following pain management on Ambien       Follow Up:  Return in about 6 months (around 12/10/2025).            [1]   Past Medical History:  Diagnosis Date    BBB (bundle branch block)     Diabetes     insulin and oral medication    Heart burn     Hyperlipidemia     Hypertension     Indigestion     Pain     knee, shoulder    Pneumonia 1974    Psychiatric problem     depression    Renal calculus 7/2013    kidney stones    Skin rash 3/16/2022    Snoring     sleep apnea questionairre completed   [2]   Past Surgical History:  Procedure Laterality Date    RECOVERY  5/13/2014    Performed by Cath-Recovery Surgery at [facility]    OTHER  2/2014    right knee    KNEE ARTHROSCOPY  8/22/2013    Performed by Maurisio Alfaro M.D. at SURGERY [facility]    MENISCECTOMY, KNEE, MEDIAL  8/22/2013    Performed by Maurisio Alfaro M.D. at SURGERY [facility]    SHOULDER ARTHROTOMY  2003    right    KNEE ARTHROSCOPY  1990    left    KNEE ARTHROSCOPY  1985    right    ORIF, ANKLE  1984    left    ORIF, KNEE  1970    left    TONSILLECTOMY  as child   [3]   Current Outpatient Medications   Medication Sig Dispense Refill    irbesartan (AVAPRO) 300 MG Tab Take 1 Tablet by mouth every day. 100 Tablet 3    ezetimibe (ZETIA) 10 MG Tab Take 1 Tablet by mouth every day. 100 Tablet 3    carvedilol (COREG) 25 MG Tab Take 0.5 Tablets by mouth 2 times a day with meals. 180 Tablet 3    atorvastatin (LIPITOR) 80 MG tablet Take 1 Tablet by mouth every evening. 100 Tablet 3    Continuous Glucose Sensor (FREESTYLE HANH 3 PLUS SENSOR) Misc 1 Each every 14 days for 36 doses. 12 Each 2    metFORMIN (GLUCOPHAGE) 500 MG Tab TAKE 1 TABLET BY MOUTH IN THE MORNING AND 2 TABLETS IN THE EVENING 270 Tablet 0    OZEMPIC, 2 MG/DOSE, 8 MG/3ML Solution Pen-injector Inject 2 mg under the skin every 7 days for 48 doses. 9 mL 3    Insulin Pen Needle 32 G x 4 mm  (BD PEN NEEDLE ANNETTE U/F) 200 Units every day at 6 PM. 100 Each 2    BD PEN NEEDLE MICRO U/F 32G X 6 MM Misc Inject 1 Each under the skin every day for 360 doses. 90 Each 3    gabapentin (NEURONTIN) 800 MG tablet Take 1 Tablet by mouth 3 times a day. 90 Tablet 2    Insulin Degludec (TRESIBA FLEXTOUCH) 100 UNIT/ML Solution Pen-injector       zolpidem (AMBIEN) 10 MG Tab Take 10 mg by mouth at bedtime as needed for Sleep.      ASPIRIN 81 MG PO TABS Take 81 mg by mouth every day.       No current facility-administered medications for this visit.   [4]   Allergies  Allergen Reactions    Penicillins Anaphylaxis, Hives, Swelling and Shortness of Breath    Norvasc [Amlodipine]      Leg swelling

## 2025-06-11 NOTE — TELEPHONE ENCOUNTER
Received a a voicemail from Brandi from [facility] stating she hasn't received a referral from [facility] yet.   Fax number: [phone]  Call back number: [phone]   Yes

## 2025-06-20 ENCOUNTER — HOSPITAL ENCOUNTER (OUTPATIENT)
Dept: RADIOLOGY | Facility: MEDICAL CENTER | Age: 75
End: 2025-06-20
Attending: ORTHOPAEDIC SURGERY
Payer: MEDICARE

## 2025-06-20 DIAGNOSIS — M17.12 ARTHRITIS OF LEFT KNEE: ICD-10-CM

## 2025-06-20 PROCEDURE — 73700 CT LOWER EXTREMITY W/O DYE: CPT | Mod: LT

## 2025-07-14 ENCOUNTER — APPOINTMENT (OUTPATIENT)
Dept: MEDICAL GROUP | Facility: OTHER | Age: 75
End: 2025-07-14
Payer: MEDICARE

## 2025-07-14 ENCOUNTER — HOSPITAL ENCOUNTER (OUTPATIENT)
Dept: RADIOLOGY | Facility: MEDICAL CENTER | Age: 75
End: 2025-07-14
Attending: ORTHOPAEDIC SURGERY
Payer: MEDICARE

## 2025-07-14 VITALS
DIASTOLIC BLOOD PRESSURE: 62 MMHG | RESPIRATION RATE: 16 BRPM | OXYGEN SATURATION: 96 % | HEART RATE: 86 BPM | WEIGHT: 227.4 LBS | SYSTOLIC BLOOD PRESSURE: 122 MMHG | BODY MASS INDEX: 28.27 KG/M2 | HEIGHT: 75 IN | TEMPERATURE: 97.5 F

## 2025-07-14 DIAGNOSIS — M71.21 BAKER'S CYST OF KNEE, RIGHT: Primary | ICD-10-CM

## 2025-07-14 DIAGNOSIS — M17.12 PRIMARY OSTEOARTHRITIS OF LEFT KNEE: ICD-10-CM

## 2025-07-14 PROCEDURE — 3078F DIAST BP <80 MM HG: CPT | Performed by: STUDENT IN AN ORGANIZED HEALTH CARE EDUCATION/TRAINING PROGRAM

## 2025-07-14 PROCEDURE — 3074F SYST BP LT 130 MM HG: CPT | Performed by: STUDENT IN AN ORGANIZED HEALTH CARE EDUCATION/TRAINING PROGRAM

## 2025-07-14 PROCEDURE — 20611 DRAIN/INJ JOINT/BURSA W/US: CPT | Mod: RT | Performed by: STUDENT IN AN ORGANIZED HEALTH CARE EDUCATION/TRAINING PROGRAM

## 2025-07-14 PROCEDURE — 73700 CT LOWER EXTREMITY W/O DYE: CPT | Mod: LT

## 2025-07-14 RX ORDER — TRIAMCINOLONE ACETONIDE 40 MG/ML
40 INJECTION, SUSPENSION INTRA-ARTICULAR; INTRAMUSCULAR ONCE
Status: COMPLETED | OUTPATIENT
Start: 2025-07-14 | End: 2025-07-14

## 2025-07-14 RX ORDER — SILVER SULFADIAZINE 10 MG/G
CREAM TOPICAL
COMMUNITY

## 2025-07-14 RX ORDER — SULFAMETHOXAZOLE AND TRIMETHOPRIM 800; 160 MG/1; MG/1
TABLET ORAL
COMMUNITY

## 2025-07-14 RX ORDER — HYDROCHLOROTHIAZIDE 50 MG/1
TABLET ORAL
COMMUNITY

## 2025-07-14 RX ADMIN — TRIAMCINOLONE ACETONIDE 40 MG: 40 INJECTION, SUSPENSION INTRA-ARTICULAR; INTRAMUSCULAR at 08:43

## 2025-07-14 NOTE — PROGRESS NOTES
Subjective:   Dejan Mooney is a 74 y.o. male here for the evaluation and management of Knee Pain (R knee pain, cyst )    HPI  Right knee pain   The patient reports that he has bilateral knee pain. He is going to do a TKA on the left knee. Left knee is much worse   Right knee pain is a stabbing pain posterior and superolateral to the knee. It also hurts on the posteriomedial side   He was recommended to come to clinic from orthopedics for a baker's cyst aspiration     ROS    Current Medications[1]    Penicillins and Norvasc [amlodipine]    Past Medical History[2]  Patient Active Problem List    Diagnosis Date Noted    Arthralgia of right knee 04/02/2025    Other headache syndrome 02/20/2025    Balance problems 01/08/2025    Coronary artery disease due to calcified coronary lesion 11/21/2024    Diabetic neuropathy (HCC) 08/11/2022    Preventative health care 02/08/2022    Dyslipidemia 08/11/2021    Obstructive sleep apnea, adult 07/26/2021    Gastroesophageal reflux disease 03/11/2021    Primary insomnia 04/15/2020    Type 2 diabetes mellitus with diabetic mononeuropathy (HCC) 09/12/2017    Elevated coronary artery calcium score 06/13/2017    Primary hypertension 06/13/2017       Past Surgical History  Past Surgical History[3]    Social History     Socioeconomic History    Marital status:      Spouse name: Not on file    Number of children: Not on file    Years of education: Not on file    Highest education level: Not on file   Occupational History    Not on file   Tobacco Use    Smoking status: Never    Smokeless tobacco: Never   Vaping Use    Vaping status: Never Used   Substance and Sexual Activity    Alcohol use: Yes     Alcohol/week: 1.2 oz     Types: 2 Standard drinks or equivalent per week     Comment: 1x/week    Drug use: No    Sexual activity: Not on file   Other Topics Concern    Not on file   Social History Narrative    Not on file     Social Drivers of Health     Financial Resource  "Strain: Not on file   Food Insecurity: Not on file   Transportation Needs: Not on file   Physical Activity: Not on file   Stress: Not on file   Social Connections: Not on file   Intimate Partner Violence: Not on file   Housing Stability: Not on file        Objective:     Vitals:    07/14/25 0752   BP: 122/62   BP Location: Left arm   Patient Position: Sitting   BP Cuff Size: Adult   Pulse: 86   Resp: 16   Temp: 36.4 °C (97.5 °F)   TempSrc: Temporal   SpO2: 96%   Weight: 103 kg (227 lb 6.4 oz)   Height: 1.892 m (6' 2.5\")     Body mass index is 28.81 kg/m².     Physical Exam    --------Procedure: Right Knee Bakers Cyst Aspiration and Injection-----------     Consent for the procedure was obtained after discussing the benefits and risks involved.     Positioning: Prone     Sterilization: Chlorhexidine swab x 3     Anesthesia: 1% lidocaine without epinephrine (7 cc)     Medication injected: 40 mg kenalog (1 cc)     Procedure:     US Guided Steroid Injection.       Under ultrasound guidance the Baker's cyst was evaluated. It was visualized extending from the knee joint. A 25G 1.5 inch needle was advanced medially to laterally towards the cyst. Lidocaine was injected along the needle path and into the cyst.      The needle was subsequently removed and there is a wait period of 1 minute for the lidocaine to take effect.  An 18-gauge spinal needle with a 20 cc empty syringe was then used to stratton the cyst capsule. Straw colored fluid was drawn out from the cyst. The syringe was removed with the needle still in place. A syringe with 1 cc of 40 mg kenalog was substituted and injected under ultrasound guidance into the space of aspiration.     Aspiration: A total of 4 ml was aspirated from the joint prior to injection     Complications: None     The site was dressed with a band-aid.      The patient tolerated the procedure well.     Wound care instructions were given verbally.   "   ------------------------------------------------------------------------------------       Assessment and Plan:   Dejan Mooney is a 74 y.o. male with a Knee Pain (R knee pain, cyst )     The following was discussed with the patient today.    1. Henao's cyst of knee, right  - triamcinolone acetonide (Kenalog-40) injection 40 mg    Other orders  - hydroCHLOROthiazide 50 MG Tab; Oral; Duration: 90 Days  - sulfamethoxazole-trimethoprim (BACTRIM DS) 800-160 MG tablet; Oral; Duration: 14 Days  - silver sulfADIAZINE (SSD) 1 % Cream; APPLY THIN LATER TO WOUND DAILY External; Duration: 7 Days    Plan:  - The patient is presenting for a right knee Baker's cyst aspiration.  He has a history of bilateral knee pains and was planning on having a total knee replacement on his left knee this fall.  He is hoping to push off any more surgeries on his right knee for as long as possible  -Risk and benefits of Baker's cyst aspiration discussed with patient at length.  He agreed to proceed.  See procedure note above.  Only 4 cc were taken out although the patient reported improvement in his symptoms afterwards.  He likely had thickening of the synovial tissue which did not allow for further aspiration.  1 cc of 4 mg of Kenalog was injected into the cyst afterwards.  The patient was wrapped with an Ace wrap around his knee to allow for compression.  Recommend that he keep the knee compressed over the next 2 days to help prevent recurrence of the cyst    Followup: Return if symptoms worsen or fail to improve.    Jonnathan Mendoza MD   [facility]   Sports Medicine PGY-4     The patient was evaluated with attending physician Dr. Oconnell           [1]   Current Outpatient Medications   Medication Sig Dispense Refill    hydroCHLOROthiazide 50 MG Tab Oral; Duration: 90 Days      sulfamethoxazole-trimethoprim (BACTRIM DS) 800-160 MG tablet Oral; Duration: 14 Days      silver sulfADIAZINE (SSD) 1 % Cream APPLY  THIN LATER TO WOUND DAILY External; Duration: 7 Days      irbesartan (AVAPRO) 300 MG Tab Take 1 Tablet by mouth every day. 100 Tablet 3    ezetimibe (ZETIA) 10 MG Tab Take 1 Tablet by mouth every day. 100 Tablet 3    carvedilol (COREG) 25 MG Tab Take 0.5 Tablets by mouth 2 times a day with meals. 180 Tablet 3    atorvastatin (LIPITOR) 80 MG tablet Take 1 Tablet by mouth every evening. 100 Tablet 3    Continuous Glucose Sensor (FREESTYLE HANH 3 PLUS SENSOR) Misc 1 Each every 14 days for 36 doses. 12 Each 2    metFORMIN (GLUCOPHAGE) 500 MG Tab TAKE 1 TABLET BY MOUTH IN THE MORNING AND 2 TABLETS IN THE EVENING 270 Tablet 0    OZEMPIC, 2 MG/DOSE, 8 MG/3ML Solution Pen-injector Inject 2 mg under the skin every 7 days for 48 doses. 9 mL 3    Insulin Pen Needle 32 G x 4 mm (BD PEN NEEDLE ANNETTE U/F) 200 Units every day at 6 PM. 100 Each 2    BD PEN NEEDLE MICRO U/F 32G X 6 MM Misc Inject 1 Each under the skin every day for 360 doses. 90 Each 3    gabapentin (NEURONTIN) 800 MG tablet Take 1 Tablet by mouth 3 times a day. 90 Tablet 2    Insulin Degludec (TRESIBA FLEXTOUCH) 100 UNIT/ML Solution Pen-injector       zolpidem (AMBIEN) 10 MG Tab Take 10 mg by mouth at bedtime as needed for Sleep.      ASPIRIN 81 MG PO TABS Take 81 mg by mouth every day.       No current facility-administered medications for this visit.   [2]   Past Medical History:  Diagnosis Date    BBB (bundle branch block)     Diabetes     insulin and oral medication    Heart burn     Hyperlipidemia     Hypertension     Indigestion     Pain     knee, shoulder    Pneumonia 1974    Psychiatric problem     depression    Renal calculus 7/2013    kidney stones    Skin rash 3/16/2022    Snoring     sleep apnea questionairre completed   [3]   Past Surgical History:  Procedure Laterality Date    RECOVERY  5/13/2014    Performed by Cath-Recovery Surgery at [facility]    OTHER  2/2014    right knee    KNEE ARTHROSCOPY  8/22/2013    Performed by  Maurisio Alfaro M.D. at SURGERY [facility]    MENISCECTOMY, KNEE, MEDIAL  8/22/2013    Performed by Maurisio Alfaro M.D. at SURGERY [facility]    SHOULDER ARTHROTOMY  2003    right    KNEE ARTHROSCOPY  1990    left    KNEE ARTHROSCOPY  1985    right    ORIF, ANKLE  1984    left    ORIF, KNEE  1970    left    TONSILLECTOMY  as child

## 2025-08-04 ENCOUNTER — OFFICE VISIT (OUTPATIENT)
Dept: URGENT CARE | Facility: CLINIC | Age: 75
End: 2025-08-04
Payer: MEDICARE

## 2025-08-04 VITALS
TEMPERATURE: 97.7 F | HEIGHT: 75 IN | BODY MASS INDEX: 28.42 KG/M2 | DIASTOLIC BLOOD PRESSURE: 60 MMHG | RESPIRATION RATE: 18 BRPM | HEART RATE: 87 BPM | SYSTOLIC BLOOD PRESSURE: 116 MMHG | OXYGEN SATURATION: 94 %

## 2025-08-04 DIAGNOSIS — E78.5 DYSLIPIDEMIA: ICD-10-CM

## 2025-08-04 DIAGNOSIS — S61.219A: Primary | ICD-10-CM

## 2025-08-04 PROCEDURE — 3074F SYST BP LT 130 MM HG: CPT

## 2025-08-04 PROCEDURE — 3078F DIAST BP <80 MM HG: CPT

## 2025-08-04 PROCEDURE — 12001 RPR S/N/AX/GEN/TRNK 2.5CM/<: CPT

## 2025-08-04 ASSESSMENT — ENCOUNTER SYMPTOMS
EYES NEGATIVE: 1
COUGH: 0
NEUROLOGICAL NEGATIVE: 1
SHORTNESS OF BREATH: 0
CHILLS: 0
MUSCULOSKELETAL NEGATIVE: 1
GASTROINTESTINAL NEGATIVE: 1
FEVER: 0

## 2025-08-11 DIAGNOSIS — Z79.4 TYPE 2 DIABETES MELLITUS WITH DIABETIC MONONEUROPATHY, WITH LONG-TERM CURRENT USE OF INSULIN (HCC): ICD-10-CM

## 2025-08-11 DIAGNOSIS — E11.41 TYPE 2 DIABETES MELLITUS WITH DIABETIC MONONEUROPATHY, WITH LONG-TERM CURRENT USE OF INSULIN (HCC): ICD-10-CM

## 2025-08-12 ENCOUNTER — TELEPHONE (OUTPATIENT)
Dept: HEALTH INFORMATION MANAGEMENT | Facility: OTHER | Age: 75
End: 2025-08-12
Payer: MEDICARE

## 2025-08-14 DIAGNOSIS — I10 PRIMARY HYPERTENSION: ICD-10-CM

## 2025-08-15 RX ORDER — CARVEDILOL 25 MG/1
TABLET ORAL
Qty: 60 TABLET | OUTPATIENT
Start: 2025-08-15